# Patient Record
Sex: FEMALE | Race: BLACK OR AFRICAN AMERICAN | NOT HISPANIC OR LATINO | Employment: FULL TIME | URBAN - METROPOLITAN AREA
[De-identification: names, ages, dates, MRNs, and addresses within clinical notes are randomized per-mention and may not be internally consistent; named-entity substitution may affect disease eponyms.]

---

## 2017-02-17 ENCOUNTER — OFFICE VISIT (OUTPATIENT)
Dept: INTERNAL MEDICINE | Facility: CLINIC | Age: 37
End: 2017-02-17
Payer: COMMERCIAL

## 2017-02-17 VITALS
HEART RATE: 102 BPM | OXYGEN SATURATION: 97 % | TEMPERATURE: 98 F | HEIGHT: 66 IN | BODY MASS INDEX: 33.8 KG/M2 | WEIGHT: 210.31 LBS | SYSTOLIC BLOOD PRESSURE: 140 MMHG | DIASTOLIC BLOOD PRESSURE: 90 MMHG

## 2017-02-17 DIAGNOSIS — G43.009 MIGRAINE WITHOUT AURA AND WITHOUT STATUS MIGRAINOSUS, NOT INTRACTABLE: ICD-10-CM

## 2017-02-17 DIAGNOSIS — Z23 IMMUNIZATION DUE: ICD-10-CM

## 2017-02-17 DIAGNOSIS — R35.0 URINARY FREQUENCY: ICD-10-CM

## 2017-02-17 DIAGNOSIS — I10 HYPERTENSION GOAL BP (BLOOD PRESSURE) < 140/90: Primary | ICD-10-CM

## 2017-02-17 PROCEDURE — 99999 PR PBB SHADOW E&M-EST. PATIENT-LVL III: CPT | Mod: PBBFAC,,, | Performed by: INTERNAL MEDICINE

## 2017-02-17 PROCEDURE — 99214 OFFICE O/P EST MOD 30 MIN: CPT | Mod: 25,S$GLB,, | Performed by: INTERNAL MEDICINE

## 2017-02-17 PROCEDURE — 90688 IIV4 VACCINE SPLT 0.5 ML IM: CPT | Mod: S$GLB,,, | Performed by: INTERNAL MEDICINE

## 2017-02-17 PROCEDURE — 90471 IMMUNIZATION ADMIN: CPT | Mod: S$GLB,,, | Performed by: INTERNAL MEDICINE

## 2017-02-17 PROCEDURE — 3080F DIAST BP >= 90 MM HG: CPT | Mod: S$GLB,,, | Performed by: INTERNAL MEDICINE

## 2017-02-17 PROCEDURE — 3077F SYST BP >= 140 MM HG: CPT | Mod: S$GLB,,, | Performed by: INTERNAL MEDICINE

## 2017-02-17 RX ORDER — HYDROCHLOROTHIAZIDE 25 MG/1
25 TABLET ORAL DAILY
Qty: 30 TABLET | Refills: 3 | Status: SHIPPED | OUTPATIENT
Start: 2017-02-17 | End: 2017-05-09

## 2017-02-17 RX ORDER — METHYLPREDNISOLONE 4 MG/1
4 TABLET ORAL
COMMUNITY
End: 2017-05-24

## 2017-02-17 RX ORDER — BUTALBITAL, ACETAMINOPHEN AND CAFFEINE 50; 325; 40 MG/1; MG/1; MG/1
TABLET ORAL EVERY 6 HOURS PRN
Refills: 0 | COMMUNITY
Start: 2017-02-11 | End: 2018-04-30 | Stop reason: SDUPTHER

## 2017-02-17 RX ORDER — NORETHINDRONE 0.35 MG/1
TABLET ORAL
Refills: 11 | COMMUNITY
Start: 2017-01-22 | End: 2018-05-01 | Stop reason: ALTCHOICE

## 2017-02-20 NOTE — PROGRESS NOTES
Subjective:       Patient ID: Judie Munoz is a 36 y.o. female.    Chief Complaint: Establish Care    HPI Comments: 36 y.o. Black or  female     Patient presents with:  Establish Care    HPI: Here today to establish care.  HTN--she had ran out of b/p medication. She was having a migraine and could not figure out if it was causing her b/p to be high or if high b/p was causing the headache. She recently sought medical care for this and was prescribed a headache medication and a steroid dose pack. She has also seen her neurologist. Her headache is improving.   She has been urinating a lot. She is doing it more since being back on HCTZ, but did have it while she was off. She is concerned about diabetes. It runs in her family. She denies burning.     Past Medical History:    Hypertension                                                  Past Surgical History:    D&C x 3                                                          Comment:miscarriages    Review of patient's family history indicates:    Heart disease                  Mother                    Hypertension                   Mother                    Diabetes                       Father                    Cancer                         Maternal Aunt             Diabetes                       Paternal Aunt             Diabetes                       Maternal Grandmother      Heart disease                  Maternal Grandmother      Kidney disease                 Maternal Grandmother      Current Outpatient Prescriptions on File Prior to Visit:  zolmitriptan (ZOMIG-ZMT) 5 MG disintegrating tablet, Take 1 tablet (5 mg total) by mouth as needed., Disp: 6 tablet, Rfl: 2    Allergies:   Review of patient's allergies indicates:  No Known Allergies            Review of Systems   Constitutional: Positive for fatigue. Negative for fever and unexpected weight change.   HENT: Negative for trouble swallowing and voice change.    Eyes: Positive for visual  disturbance.   Respiratory: Negative for cough and shortness of breath.    Cardiovascular: Positive for chest pain, palpitations and leg swelling.   Gastrointestinal: Negative for abdominal pain, blood in stool, constipation and diarrhea.   Genitourinary: Positive for frequency and menstrual problem. Negative for dysuria. Urgency: irregular.   Musculoskeletal: Negative for arthralgias, back pain, gait problem and joint swelling.   Neurological: Positive for dizziness and headaches.   Psychiatric/Behavioral: Negative for dysphoric mood and sleep disturbance. The patient is not nervous/anxious.        Objective:      Physical Exam   Constitutional: She is oriented to person, place, and time. She appears well-developed and well-nourished. No distress.   Eyes: No scleral icterus.   Cardiovascular: Normal rate, regular rhythm and normal heart sounds.    Pulmonary/Chest: Effort normal and breath sounds normal. No respiratory distress.   Abdominal: Soft. Bowel sounds are normal.   Neurological: She is alert and oriented to person, place, and time.   Skin: Skin is warm and dry.   Psychiatric: She has a normal mood and affect.   Vitals reviewed.      Assessment:       1. Hypertension goal BP (blood pressure) < 140/90    2. Migraine without aura and without status migrainosus, not intractable    3. Urinary frequency    4. Immunization due        Plan:       Judie MACIAS was seen today for establish care.    Diagnoses and all orders for this visit:    Hypertension goal BP (blood pressure) < 140/90  -     Comprehensive metabolic panel; Future  -     TSH; Future  -     Lipid panel; Future  -     CBC auto differential; Future  -     hydrochlorothiazide (HYDRODIURIL) 25 MG tablet; Take 1 tablet (25 mg total) by mouth once daily.    Migraine without aura and without status migrainosus, not intractable  -      F/U with neurology    Urinary frequency  -     Hemoglobin A1c; Future    Immunization due  -     Influenza -  Quadrivalent    Schedule labs.

## 2017-04-24 ENCOUNTER — LAB VISIT (OUTPATIENT)
Dept: LAB | Facility: HOSPITAL | Age: 37
End: 2017-04-24
Attending: INTERNAL MEDICINE
Payer: COMMERCIAL

## 2017-04-24 DIAGNOSIS — R35.0 URINARY FREQUENCY: ICD-10-CM

## 2017-04-24 DIAGNOSIS — I10 HYPERTENSION GOAL BP (BLOOD PRESSURE) < 140/90: ICD-10-CM

## 2017-04-24 LAB
BASOPHILS # BLD AUTO: 0.02 K/UL
BASOPHILS NFR BLD: 0.2 %
DIFFERENTIAL METHOD: NORMAL
EOSINOPHIL # BLD AUTO: 0.1 K/UL
EOSINOPHIL NFR BLD: 1 %
ERYTHROCYTE [DISTWIDTH] IN BLOOD BY AUTOMATED COUNT: 14.1 %
HCT VFR BLD AUTO: 40.5 %
HGB BLD-MCNC: 13.1 G/DL
LYMPHOCYTES # BLD AUTO: 2.9 K/UL
LYMPHOCYTES NFR BLD: 30.5 %
MCH RBC QN AUTO: 27.6 PG
MCHC RBC AUTO-ENTMCNC: 32.3 %
MCV RBC AUTO: 85 FL
MONOCYTES # BLD AUTO: 0.7 K/UL
MONOCYTES NFR BLD: 7.4 %
NEUTROPHILS # BLD AUTO: 5.7 K/UL
NEUTROPHILS NFR BLD: 60.7 %
PLATELET # BLD AUTO: 324 K/UL
PMV BLD AUTO: 10.2 FL
RBC # BLD AUTO: 4.74 M/UL
WBC # BLD AUTO: 9.38 K/UL

## 2017-04-24 PROCEDURE — 80061 LIPID PANEL: CPT

## 2017-04-24 PROCEDURE — 85025 COMPLETE CBC W/AUTO DIFF WBC: CPT

## 2017-04-24 PROCEDURE — 80053 COMPREHEN METABOLIC PANEL: CPT

## 2017-04-24 PROCEDURE — 83036 HEMOGLOBIN GLYCOSYLATED A1C: CPT

## 2017-04-24 PROCEDURE — 36415 COLL VENOUS BLD VENIPUNCTURE: CPT

## 2017-04-24 PROCEDURE — 84443 ASSAY THYROID STIM HORMONE: CPT

## 2017-04-25 LAB
ALBUMIN SERPL BCP-MCNC: 3.6 G/DL
ALP SERPL-CCNC: 62 U/L
ALT SERPL W/O P-5'-P-CCNC: 11 U/L
ANION GAP SERPL CALC-SCNC: 11 MMOL/L
AST SERPL-CCNC: 15 U/L
BILIRUB SERPL-MCNC: 0.6 MG/DL
BUN SERPL-MCNC: 12 MG/DL
CALCIUM SERPL-MCNC: 9.1 MG/DL
CHLORIDE SERPL-SCNC: 102 MMOL/L
CHOLEST/HDLC SERPL: 3.4 {RATIO}
CO2 SERPL-SCNC: 27 MMOL/L
CREAT SERPL-MCNC: 0.8 MG/DL
EST. GFR  (AFRICAN AMERICAN): >60 ML/MIN/1.73 M^2
EST. GFR  (NON AFRICAN AMERICAN): >60 ML/MIN/1.73 M^2
ESTIMATED AVG GLUCOSE: 120 MG/DL
GLUCOSE SERPL-MCNC: 72 MG/DL
HBA1C MFR BLD HPLC: 5.8 %
HDL/CHOLESTEROL RATIO: 29.3 %
HDLC SERPL-MCNC: 164 MG/DL
HDLC SERPL-MCNC: 48 MG/DL
LDLC SERPL CALC-MCNC: 102.4 MG/DL
NONHDLC SERPL-MCNC: 116 MG/DL
POTASSIUM SERPL-SCNC: 3.8 MMOL/L
PROT SERPL-MCNC: 7.6 G/DL
SODIUM SERPL-SCNC: 140 MMOL/L
TRIGL SERPL-MCNC: 68 MG/DL
TSH SERPL DL<=0.005 MIU/L-ACNC: 1.1 UIU/ML

## 2017-04-28 ENCOUNTER — TELEPHONE (OUTPATIENT)
Dept: INTERNAL MEDICINE | Facility: CLINIC | Age: 37
End: 2017-04-28

## 2017-04-28 NOTE — TELEPHONE ENCOUNTER
----- Message from Daphne Guerrero DO sent at 4/25/2017  1:19 PM CDT -----  Notify patient labs are in normal range. No diabetes, thyroid disease, kidney disease or anemia.

## 2017-05-10 RX ORDER — HYDROCHLOROTHIAZIDE 25 MG/1
TABLET ORAL
Qty: 30 TABLET | Refills: 0 | OUTPATIENT
Start: 2017-05-10

## 2017-05-24 ENCOUNTER — OFFICE VISIT (OUTPATIENT)
Dept: INTERNAL MEDICINE | Facility: CLINIC | Age: 37
End: 2017-05-24
Payer: COMMERCIAL

## 2017-05-24 ENCOUNTER — OFFICE VISIT (OUTPATIENT)
Dept: OPHTHALMOLOGY | Facility: CLINIC | Age: 37
End: 2017-05-24
Payer: COMMERCIAL

## 2017-05-24 VITALS
BODY MASS INDEX: 34.78 KG/M2 | HEART RATE: 96 BPM | TEMPERATURE: 98 F | SYSTOLIC BLOOD PRESSURE: 126 MMHG | OXYGEN SATURATION: 97 % | DIASTOLIC BLOOD PRESSURE: 78 MMHG | WEIGHT: 221.56 LBS | HEIGHT: 67 IN

## 2017-05-24 DIAGNOSIS — H53.10 EYE STRAIN, BILATERAL: ICD-10-CM

## 2017-05-24 DIAGNOSIS — I10 ESSENTIAL HYPERTENSION: ICD-10-CM

## 2017-05-24 DIAGNOSIS — R51.9 FREQUENT HEADACHES: Primary | ICD-10-CM

## 2017-05-24 DIAGNOSIS — G43.019 INTRACTABLE MIGRAINE WITHOUT AURA AND WITHOUT STATUS MIGRAINOSUS: Primary | ICD-10-CM

## 2017-05-24 DIAGNOSIS — H53.9 CHANGES IN VISION: ICD-10-CM

## 2017-05-24 DIAGNOSIS — H52.03 HYPEROPIA, BILATERAL: ICD-10-CM

## 2017-05-24 PROCEDURE — 99214 OFFICE O/P EST MOD 30 MIN: CPT | Mod: S$GLB,,, | Performed by: FAMILY MEDICINE

## 2017-05-24 PROCEDURE — 92004 COMPRE OPH EXAM NEW PT 1/>: CPT | Mod: S$GLB,,, | Performed by: OPTOMETRIST

## 2017-05-24 PROCEDURE — 99999 PR PBB SHADOW E&M-EST. PATIENT-LVL III: CPT | Mod: PBBFAC,,, | Performed by: FAMILY MEDICINE

## 2017-05-24 PROCEDURE — 3078F DIAST BP <80 MM HG: CPT | Mod: S$GLB,,, | Performed by: FAMILY MEDICINE

## 2017-05-24 PROCEDURE — 92015 DETERMINE REFRACTIVE STATE: CPT | Mod: S$GLB,,, | Performed by: OPTOMETRIST

## 2017-05-24 PROCEDURE — 1160F RVW MEDS BY RX/DR IN RCRD: CPT | Mod: S$GLB,,, | Performed by: FAMILY MEDICINE

## 2017-05-24 PROCEDURE — 99999 PR PBB SHADOW E&M-EST. PATIENT-LVL I: CPT | Mod: PBBFAC,,, | Performed by: OPTOMETRIST

## 2017-05-24 PROCEDURE — 3074F SYST BP LT 130 MM HG: CPT | Mod: S$GLB,,, | Performed by: OPTOMETRIST

## 2017-05-24 PROCEDURE — 3074F SYST BP LT 130 MM HG: CPT | Mod: S$GLB,,, | Performed by: FAMILY MEDICINE

## 2017-05-24 PROCEDURE — 3078F DIAST BP <80 MM HG: CPT | Mod: S$GLB,,, | Performed by: OPTOMETRIST

## 2017-05-24 RX ORDER — HYDROCHLOROTHIAZIDE 25 MG/1
25 TABLET ORAL DAILY
COMMUNITY
End: 2017-10-30 | Stop reason: SDUPTHER

## 2017-05-24 RX ORDER — VENLAFAXINE HYDROCHLORIDE 75 MG/1
CAPSULE, EXTENDED RELEASE ORAL
Refills: 3 | COMMUNITY
Start: 2017-05-02 | End: 2018-05-01 | Stop reason: ALTCHOICE

## 2017-05-24 NOTE — LETTER
May 24, 2017      Ritika Tong MD  [address] [place] [id number]           [facility]  [address] [place] [id number]  Phone: [phone]  Fax: [phone]          Patient: Judie Munoz   MR Number: [id number]   YOB: 1980   Date of Visit: 5/24/2017       Dear Dr. Ritika Tong:    Thank you for referring Judie Munoz to me for evaluation. Attached you will find relevant portions of my assessment and plan of care.    If you have questions, please do not hesitate to call me. I look forward to following Judie Munoz along with you.    Sincerely,    Stevie Bennett, OD    Enclosure  CC:  No Recipients    If you would like to receive this communication electronically, please contact [email or web address] or [phone] to request more information on [employer] Link access.    For providers and/or their staff who would like to refer a patient to [facility], please contact us through our one-stop-shop provider referral line, [facility], at [phone].    If you feel you have received this communication in error or would no longer like to receive these types of communications, please e-mail [email or web address]

## 2017-05-24 NOTE — PROGRESS NOTES
HPI     Patient referred by Dr. Ritika Tong due pressure in back of head x 3   days and temples twitching. mirgaines x 25 years. New patient last eye   exam 3 months ago no problems. No headaches today . HX of floaters no   flashes of light.    Last edited by Stevie Bennett, OD on 5/24/2017 11:46 AM. (History)            Assessment /Plan     For exam results, see Encounter Report.    Frequent headaches    Essential hypertension    Hyperopia, bilateral      Eyestrain at the computer 18 hours/day causing myokymia and headaches.    She will try mild computer glasses for relief.    Dispense Final Rx for glasses. Or OTC +1.00  RTC 1 year

## 2017-06-26 ENCOUNTER — OFFICE VISIT (OUTPATIENT)
Dept: INTERNAL MEDICINE | Facility: CLINIC | Age: 37
End: 2017-06-26
Payer: COMMERCIAL

## 2017-06-26 VITALS
HEART RATE: 94 BPM | OXYGEN SATURATION: 98 % | DIASTOLIC BLOOD PRESSURE: 87 MMHG | TEMPERATURE: 99 F | HEIGHT: 67 IN | WEIGHT: 224.88 LBS | BODY MASS INDEX: 35.29 KG/M2 | SYSTOLIC BLOOD PRESSURE: 138 MMHG

## 2017-06-26 DIAGNOSIS — H92.03 OTALGIA OF BOTH EARS: ICD-10-CM

## 2017-06-26 DIAGNOSIS — J01.00 ACUTE NON-RECURRENT MAXILLARY SINUSITIS: ICD-10-CM

## 2017-06-26 DIAGNOSIS — R51.9 SINUS HEADACHE: Primary | ICD-10-CM

## 2017-06-26 PROCEDURE — 99999 PR PBB SHADOW E&M-EST. PATIENT-LVL III: CPT | Mod: PBBFAC,,, | Performed by: FAMILY MEDICINE

## 2017-06-26 PROCEDURE — 99214 OFFICE O/P EST MOD 30 MIN: CPT | Mod: S$GLB,,, | Performed by: FAMILY MEDICINE

## 2017-06-26 RX ORDER — METHYLPREDNISOLONE 4 MG/1
TABLET ORAL
Qty: 1 PACKAGE | Refills: 0 | Status: SHIPPED | OUTPATIENT
Start: 2017-06-26 | End: 2017-07-11 | Stop reason: ALTCHOICE

## 2017-06-26 RX ORDER — CEFDINIR 300 MG/1
300 CAPSULE ORAL 2 TIMES DAILY
Qty: 20 CAPSULE | Refills: 0 | Status: SHIPPED | OUTPATIENT
Start: 2017-06-26 | End: 2017-07-06

## 2017-06-26 NOTE — PATIENT INSTRUCTIONS

## 2017-06-26 NOTE — PROGRESS NOTES
Judie Munoz  06/26/2017  [id number]    Teresa Flores MD  Patient Care Team:  Teresa Flores MD as PCP - General (Family Medicine)  Has the patient seen any provider outside of the [facility] network since the last visit? (no). If yes, HIPPA forms completed and records requested.        Visit Type:an urgent visit for a new problem    Chief Complaint:  Chief Complaint   Patient presents with    Headache     Sinus Congestion x 2nd Week, Took OTC Meds    Otalgia     Both Ears       History of Present Illness:  HPI     Headache    Additional comments: Sinus Congestion x 2nd Week, Took OTC Meds           Otalgia    Additional comments: Both Ears       Last edited by Kala Zarate MA on 6/26/2017  2:19 PM. (History)      Patient is here for ear pain, reports 2 weeks, both ears.  She reports having some sinus drainage. She has associated headaches, located frontal and she reports she does have associated pain and pressure bilateral maxillary region.    She had tried the Fioricet for the headache that hasn't seemed to help. She did try Claritin daily.  She denies any associated sneeze or rhinorrhea. No fever.    She is taking HCTZ for her BP. Recheck it was better today.  Labs up to date.    History:  Past Medical History:   Diagnosis Date    Hypertension      Past Surgical History:   Procedure Laterality Date    D&C x 3       miscarriages     Family History   Problem Relation Age of Onset    Heart disease Mother     Hypertension Mother     Diabetes Father     Cancer Maternal Aunt     Diabetes Paternal Aunt     Diabetes Maternal Grandmother     Heart disease Maternal Grandmother     Kidney disease Maternal Grandmother      Social History     Social History    Marital status: Single     Spouse name: N/A    Number of children: N/A    Years of education: N/A     Occupational History     [employer]     Social History Main Topics    Smoking status: Never Smoker    Smokeless tobacco: Never  Used    Alcohol use No    Drug use: No    Sexual activity: Not Currently     Partners: Male     Other Topics Concern    Not on file     Social History Narrative    No narrative on file     Patient Active Problem List   Diagnosis    Hypertension    Migraines     Review of patient's allergies indicates:  No Known Allergies    The following were reviewed at this visit: active problem list, medication list, allergies, family history, social history, and health maintenance.    Medications:  Current Outpatient Prescriptions on File Prior to Visit   Medication Sig Dispense Refill    butalbital-acetaminophen-caffeine -40 mg (FIORICET, ESGIC) -40 mg per tablet every 6 (six) hours as needed.  0    hydrochlorothiazide (HYDRODIURIL) 25 MG tablet Take 25 mg by mouth once daily.      POP-BE 0.35 mg tablet TK 1 T PO D  11    zolmitriptan (ZOMIG-ZMT) 5 MG disintegrating tablet Take 1 tablet (5 mg total) by mouth as needed. 6 tablet 2    venlafaxine (EFFEXOR-XR) 75 MG 24 hr capsule TK 1 C PO HS  3     No current facility-administered medications on file prior to visit.        Medications have been reviewed and reconciled with patient at this visit.  Barriers to medications present (no)    Adverse reactions to current medications (no)    Over the counter medications reviewed (Yes ), and if needed added to active Medication list at this visit.     Exam:  Wt Readings from Last 3 Encounters:   06/26/17 102 kg (224 lb 13.9 oz)   05/24/17 100.5 kg (221 lb 9 oz)   02/17/17 95.4 kg (210 lb 5.1 oz)     Temp Readings from Last 3 Encounters:   06/26/17 98.8 °F (37.1 °C) (Tympanic)   05/24/17 98.3 °F (36.8 °C) (Tympanic)   02/17/17 97.7 °F (36.5 °C) (Tympanic)     BP Readings from Last 3 Encounters:   06/26/17 138/87   05/24/17 126/78   02/17/17 (!) 140/90     Pulse Readings from Last 3 Encounters:   06/26/17 94   05/24/17 96   02/17/17 102     Body mass index is 35.21 kg/m².      Review of Systems   Constitutional:  Negative.  Negative for chills and fever.   HENT: Positive for congestion and ear pain. Negative for ear discharge and sore throat.    Eyes: Negative for blurred vision and double vision.   Respiratory: Negative for cough and wheezing.    Cardiovascular: Negative for chest pain and palpitations.   Gastrointestinal: Negative for nausea and vomiting.   Genitourinary: Negative.    Musculoskeletal: Negative.    Skin: Negative for itching and rash.   Neurological: Positive for headaches. Negative for dizziness, tingling, tremors and sensory change.   Endo/Heme/Allergies: Does not bruise/bleed easily.   Psychiatric/Behavioral: Negative for depression. The patient is not nervous/anxious.        Physical Exam   Constitutional: She appears well-developed and well-nourished. No distress.   HENT:   Head: Normocephalic and atraumatic.   Right Ear: A middle ear effusion is present.   Left Ear: A middle ear effusion is present.   Nose: Rhinorrhea present. Right sinus exhibits maxillary sinus tenderness and frontal sinus tenderness. Left sinus exhibits maxillary sinus tenderness and frontal sinus tenderness.   Mouth/Throat: Oropharynx is clear and moist. No oropharyngeal exudate.   Eyes: EOM are normal. Pupils are equal, round, and reactive to light.   Neck: Normal range of motion. No thyromegaly present.   Cardiovascular: Normal rate, regular rhythm, normal heart sounds and intact distal pulses.    No murmur heard.  Lymphadenopathy:     She has no cervical adenopathy.   Skin: She is not diaphoretic.   Nursing note and vitals reviewed.      Laboratory Reviewed ({Yes)  Lab Results   Component Value Date    WBC 9.38 04/24/2017    HGB 13.1 04/24/2017    HCT 40.5 04/24/2017     04/24/2017    CHOL 164 04/24/2017    TRIG 68 04/24/2017    HDL 48 04/24/2017    ALT 11 04/24/2017    AST 15 04/24/2017     04/24/2017    K 3.8 04/24/2017     04/24/2017    CREATININE 0.8 04/24/2017    BUN 12 04/24/2017    CO2 27 04/24/2017     TSH 1.099 04/24/2017    HGBA1C 5.8 04/24/2017       Assessment:  The primary encounter diagnosis was Sinus headache. Diagnoses of Acute non-recurrent maxillary sinusitis and Otalgia of both ears were also pertinent to this visit.     Plan       Sinus headache  -     cefdinir (OMNICEF) 300 MG capsule; Take 1 capsule (300 mg total) by mouth 2 (two) times daily.  Dispense: 20 capsule; Refill: 0  -     methylPREDNISolone (MEDROL DOSEPACK) 4 mg tablet; use as directed  Dispense: 1 Package; Refill: 0    Acute non-recurrent maxillary sinusitis  -     cefdinir (OMNICEF) 300 MG capsule; Take 1 capsule (300 mg total) by mouth 2 (two) times daily.  Dispense: 20 capsule; Refill: 0  -     methylPREDNISolone (MEDROL DOSEPACK) 4 mg tablet; use as directed  Dispense: 1 Package; Refill: 0    Otalgia of both ears  -     cefdinir (OMNICEF) 300 MG capsule; Take 1 capsule (300 mg total) by mouth 2 (two) times daily.  Dispense: 20 capsule; Refill: 0  -     methylPREDNISolone (MEDROL DOSEPACK) 4 mg tablet; use as directed  Dispense: 1 Package; Refill: 0      Care Plan/Goals: Reviewed  (N/A)  Goals     None          Follow up: No Follow-up on file.    After visit summary was printed and given to patient upon discharge today.  Patient goals and care plan are included in After Visit Summary.

## 2017-07-10 ENCOUNTER — PATIENT OUTREACH (OUTPATIENT)
Dept: ADMINISTRATIVE | Facility: HOSPITAL | Age: 37
End: 2017-07-10

## 2017-07-10 NOTE — PROGRESS NOTES
I have attempted without success to contact this patient to schedule an appointment for blood pressure recheck. Patient not available, unable to leave voicemail, full.

## 2017-07-11 ENCOUNTER — OFFICE VISIT (OUTPATIENT)
Dept: INTERNAL MEDICINE | Facility: CLINIC | Age: 37
End: 2017-07-11
Payer: COMMERCIAL

## 2017-07-11 VITALS
TEMPERATURE: 98 F | DIASTOLIC BLOOD PRESSURE: 80 MMHG | HEART RATE: 98 BPM | BODY MASS INDEX: 35.71 KG/M2 | WEIGHT: 227.5 LBS | SYSTOLIC BLOOD PRESSURE: 126 MMHG | HEIGHT: 67 IN | OXYGEN SATURATION: 99 %

## 2017-07-11 DIAGNOSIS — B37.9 ANTIBIOTIC-INDUCED YEAST INFECTION: ICD-10-CM

## 2017-07-11 DIAGNOSIS — R30.0 DYSURIA: ICD-10-CM

## 2017-07-11 DIAGNOSIS — N39.0 URINARY TRACT INFECTION WITHOUT HEMATURIA, SITE UNSPECIFIED: Primary | ICD-10-CM

## 2017-07-11 DIAGNOSIS — T36.95XA ANTIBIOTIC-INDUCED YEAST INFECTION: ICD-10-CM

## 2017-07-11 LAB
BACTERIA #/AREA URNS HPF: ABNORMAL /HPF
BILIRUB UR QL STRIP: NEGATIVE
CLARITY UR: ABNORMAL
COLOR UR: YELLOW
GLUCOSE UR QL STRIP: NEGATIVE
HGB UR QL STRIP: ABNORMAL
KETONES UR QL STRIP: NEGATIVE
LEUKOCYTE ESTERASE UR QL STRIP: ABNORMAL
MICROSCOPIC COMMENT: ABNORMAL
NITRITE UR QL STRIP: NEGATIVE
PH UR STRIP: 6 [PH] (ref 5–8)
PROT UR QL STRIP: ABNORMAL
RBC #/AREA URNS HPF: 60 /HPF (ref 0–4)
SP GR UR STRIP: 1.01 (ref 1–1.03)
SQUAMOUS #/AREA URNS HPF: 74 /HPF
URN SPEC COLLECT METH UR: ABNORMAL
WBC #/AREA URNS HPF: >100 /HPF (ref 0–5)
YEAST URNS QL MICRO: ABNORMAL

## 2017-07-11 PROCEDURE — 99999 PR PBB SHADOW E&M-EST. PATIENT-LVL IV: CPT | Mod: PBBFAC,,, | Performed by: NURSE PRACTITIONER

## 2017-07-11 PROCEDURE — 81000 URINALYSIS NONAUTO W/SCOPE: CPT

## 2017-07-11 PROCEDURE — 99214 OFFICE O/P EST MOD 30 MIN: CPT | Mod: S$GLB,,, | Performed by: NURSE PRACTITIONER

## 2017-07-11 PROCEDURE — 87086 URINE CULTURE/COLONY COUNT: CPT

## 2017-07-11 RX ORDER — CEFDINIR 300 MG/1
300 CAPSULE ORAL 2 TIMES DAILY
COMMUNITY
End: 2017-11-01

## 2017-07-11 RX ORDER — PHENAZOPYRIDINE HYDROCHLORIDE 100 MG/1
100 TABLET, FILM COATED ORAL 3 TIMES DAILY PRN
Qty: 6 TABLET | Refills: 0 | Status: SHIPPED | OUTPATIENT
Start: 2017-07-11 | End: 2017-07-21

## 2017-07-11 RX ORDER — CIPROFLOXACIN 500 MG/1
500 TABLET ORAL EVERY 12 HOURS
Qty: 14 TABLET | Refills: 0 | Status: SHIPPED | OUTPATIENT
Start: 2017-07-11 | End: 2017-07-18

## 2017-07-11 RX ORDER — FLUCONAZOLE 150 MG/1
150 TABLET ORAL DAILY
Qty: 1 TABLET | Refills: 1 | Status: SHIPPED | OUTPATIENT
Start: 2017-07-11 | End: 2017-07-12

## 2017-07-11 NOTE — PROGRESS NOTES
Subjective:       Patient ID: Judie Munoz is a 36 y.o. female.    Chief Complaint: Other (pain with urination)    Patient presents with dysuria for past four days.      Dysuria    This is a new problem. The current episode started in the past 7 days. The problem occurs intermittently. The problem has been gradually worsening. The quality of the pain is described as burning. The pain is at a severity of 7/10. There has been no fever. She is sexually active. There is no history of pyelonephritis. Associated symptoms include chills, flank pain, frequency, hesitancy, urgency and bubble bath use. Pertinent negatives include no hematuria, nausea, possible pregnancy, vomiting or constipation. Associated symptoms comments: Hot tub/hot baths. She has tried increased fluids (cranberry juice) for the symptoms. The treatment provided no relief.     Review of Systems   Constitutional: Positive for chills. Negative for fever.   Gastrointestinal: Negative for constipation, nausea and vomiting.   Genitourinary: Positive for dysuria, flank pain, frequency, hesitancy and urgency. Negative for enuresis, hematuria and vaginal discharge.   Musculoskeletal: Positive for back pain.       Objective:      Physical Exam   Constitutional: She appears well-developed and well-nourished. No distress.   HENT:   Head: Normocephalic.   Eyes: Conjunctivae and EOM are normal. Pupils are equal, round, and reactive to light.   Cardiovascular: Normal rate and regular rhythm.    No murmur heard.  Pulmonary/Chest: Effort normal and breath sounds normal. No respiratory distress. She has no wheezes.   Abdominal: Bowel sounds are normal. She exhibits no distension and no mass. There is tenderness in the suprapubic area and left lower quadrant. There is CVA tenderness (left). There is no rebound and no guarding.   Psychiatric: She has a normal mood and affect.   Vitals reviewed.      Assessment:       1. Urinary tract infection without hematuria, site  unspecified    2. Dysuria    3. Antibiotic-induced yeast infection        Plan:   Urinary tract infection without hematuria, site unspecified  -     ciprofloxacin HCl (CIPRO) 500 MG tablet; Take 1 tablet (500 mg total) by mouth every 12 (twelve) hours.  Dispense: 14 tablet; Refill: 0    Dysuria  -     Urinalysis  -     Urine culture  -     phenazopyridine (PYRIDIUM) 100 MG tablet; Take 1 tablet (100 mg total) by mouth 3 (three) times daily as needed for Pain.  Dispense: 6 tablet; Refill: 0    Antibiotic-induced yeast infection  -     fluconazole (DIFLUCAN) 150 MG Tab; Take 1 tablet (150 mg total) by mouth once daily.  Dispense: 1 tablet; Refill: 1    Other orders  -     Urinalysis Microscopic      Discussed UA results and treat based off symptoms, exam, and UA. Will notify patient will culture results. Patient verbalized understanding and agreement.  Return if symptoms worsen or fail to improve.

## 2017-07-13 LAB
BACTERIA UR CULT: NORMAL
BACTERIA UR CULT: NORMAL

## 2017-07-17 ENCOUNTER — TELEPHONE (OUTPATIENT)
Dept: INTERNAL MEDICINE | Facility: CLINIC | Age: 37
End: 2017-07-17

## 2017-07-17 NOTE — TELEPHONE ENCOUNTER
Patient notified of results and recommendations. Advised to call the office as needed, patient verbalized understanding.

## 2017-07-17 NOTE — TELEPHONE ENCOUNTER
----- Message from Leah Tong NP sent at 7/17/2017  8:11 AM CDT -----  Please notify patient urine culture did not grow out one specific bacteria so continue cipro and if symptoms return, we can repeat.

## 2017-10-28 DIAGNOSIS — I10 HYPERTENSION GOAL BP (BLOOD PRESSURE) < 140/90: ICD-10-CM

## 2017-10-30 RX ORDER — HYDROCHLOROTHIAZIDE 25 MG/1
TABLET ORAL
Qty: 30 TABLET | Refills: 1 | Status: SHIPPED | OUTPATIENT
Start: 2017-10-30 | End: 2018-04-30 | Stop reason: SDUPTHER

## 2017-11-01 ENCOUNTER — OFFICE VISIT (OUTPATIENT)
Dept: INTERNAL MEDICINE | Facility: CLINIC | Age: 37
End: 2017-11-01
Payer: COMMERCIAL

## 2017-11-01 ENCOUNTER — TELEPHONE (OUTPATIENT)
Dept: INTERNAL MEDICINE | Facility: CLINIC | Age: 37
End: 2017-11-01

## 2017-11-01 VITALS
SYSTOLIC BLOOD PRESSURE: 130 MMHG | HEIGHT: 67 IN | OXYGEN SATURATION: 99 % | DIASTOLIC BLOOD PRESSURE: 96 MMHG | BODY MASS INDEX: 36.34 KG/M2 | HEART RATE: 87 BPM | TEMPERATURE: 98 F | WEIGHT: 231.5 LBS

## 2017-11-01 DIAGNOSIS — G43.C0 PERIODIC HEADACHE SYNDROME, NOT INTRACTABLE: Primary | ICD-10-CM

## 2017-11-01 PROCEDURE — 99213 OFFICE O/P EST LOW 20 MIN: CPT | Mod: 25,S$GLB,, | Performed by: FAMILY MEDICINE

## 2017-11-01 PROCEDURE — 96372 THER/PROPH/DIAG INJ SC/IM: CPT | Mod: S$GLB,,, | Performed by: FAMILY MEDICINE

## 2017-11-01 PROCEDURE — 99999 PR PBB SHADOW E&M-EST. PATIENT-LVL III: CPT | Mod: PBBFAC,,, | Performed by: FAMILY MEDICINE

## 2017-11-01 RX ORDER — KETOROLAC TROMETHAMINE 30 MG/ML
60 INJECTION, SOLUTION INTRAMUSCULAR; INTRAVENOUS ONCE
Status: COMPLETED | OUTPATIENT
Start: 2017-11-01 | End: 2017-11-01

## 2017-11-01 RX ORDER — TRAMADOL HYDROCHLORIDE 50 MG/1
50 TABLET ORAL EVERY 12 HOURS PRN
Qty: 5 TABLET | Refills: 0 | Status: SHIPPED | OUTPATIENT
Start: 2017-11-01 | End: 2017-11-11

## 2017-11-01 RX ADMIN — KETOROLAC TROMETHAMINE 60 MG: 30 INJECTION, SOLUTION INTRAMUSCULAR; INTRAVENOUS at 09:11

## 2017-11-01 NOTE — TELEPHONE ENCOUNTER
----- Message from Esperanza Robertson sent at 11/1/2017  3:28 PM CDT -----  Contact: pt  Calling in regards to the shot did not help her headaches and was told to give you a call if headaches did not stop, [phone]

## 2017-11-01 NOTE — PROGRESS NOTES
Judie Munoz  11/01/2017  [id number]    Teresa Flores MD  Patient Care Team:  Teresa Flores MD as PCP - General (Family Medicine)  Has the patient seen any provider outside of the [facility] network since the last visit? (no). If yes, HIPPA forms completed and records requested.        Visit Type:an urgent visit for an existing problem     Chief Complaint:  Chief Complaint   Patient presents with    Headache     Since Saturday,Getting Worse Everyday,Took All Migraine Meds,Not Working       History of Present Illness:  37 year old here for Headache, Migraine type.  She has history of migraines.  Previous tx with Fioricet and Zomig.    Chart review shows that she has history of migraines. She has not had any imaging per her reports.  She is on Firociet and Zomig.  She reports she had been on Topamax in past, which she reports she stopped because of anxiety and poor sleep.   She was switched to Effexor. She started that in December 2016..     She has been controlling the Migraines since our last visit.  This one started Sat, possible due to weather change as trigger.  The zomig and Fioricet has not relieved.  Mild nausea and photophobia. She has not vomited.    She has went to ER in past for Migraine, she has gotten a shot of Toradol before to help with migraine per patient report.    History:  Past Medical History:   Diagnosis Date    Hypertension      Past Surgical History:   Procedure Laterality Date    D&C x 3       miscarriages     Family History   Problem Relation Age of Onset    Heart disease Mother     Hypertension Mother     Diabetes Father     Cancer Maternal Aunt     Diabetes Paternal Aunt     Diabetes Maternal Grandmother     Heart disease Maternal Grandmother     Kidney disease Maternal Grandmother      Social History     Social History    Marital status: Single     Spouse name: N/A    Number of children: 2    Years of education: N/A     Occupational History     [employer]      Social History Main Topics    Smoking status: Never Smoker    Smokeless tobacco: Never Used    Alcohol use No    Drug use: No    Sexual activity: Not Currently     Partners: Male     Other Topics Concern    Not on file     Social History Narrative    No narrative on file     Patient Active Problem List   Diagnosis    Hypertension    Migraines     Review of patient's allergies indicates:  No Known Allergies    The following were reviewed at this visit: active problem list, medication list, allergies, family history, social history, and health maintenance.    Medications:  Current Outpatient Prescriptions on File Prior to Visit   Medication Sig Dispense Refill    butalbital-acetaminophen-caffeine -40 mg (FIORICET, ESGIC) -40 mg per tablet every 6 (six) hours as needed.  0    hydroCHLOROthiazide (HYDRODIURIL) 25 MG tablet TAKE 1 TABLET BY MOUTH ONCE DAILY 30 tablet 1    POP-BE 0.35 mg tablet TK 1 T PO D  11    zolmitriptan (ZOMIG-ZMT) 5 MG disintegrating tablet Take 1 tablet (5 mg total) by mouth as needed. 6 tablet 2    venlafaxine (EFFEXOR-XR) 75 MG 24 hr capsule TK 1 C PO HS  3    [DISCONTINUED] cefdinir (OMNICEF) 300 MG capsule Take 300 mg by mouth 2 (two) times daily.       No current facility-administered medications on file prior to visit.        Medications have been reviewed and reconciled with patient at this visit.  Barriers to medications present (no)    Adverse reactions to current medications (no)    Over the counter medications reviewed (Yes ), and if needed added to active Medication list at this visit.     Exam:  Wt Readings from Last 3 Encounters:   11/01/17 105 kg (231 lb 8 oz)   07/11/17 103.2 kg (227 lb 8.2 oz)   06/26/17 102 kg (224 lb 13.9 oz)     Temp Readings from Last 3 Encounters:   11/01/17 98.3 °F (36.8 °C) (Tympanic)   07/11/17 98.4 °F (36.9 °C) (Tympanic)   06/26/17 98.8 °F (37.1 °C) (Tympanic)     BP Readings from Last 3 Encounters:   11/01/17 (!) 130/96    07/11/17 126/80   06/26/17 138/87     Pulse Readings from Last 3 Encounters:   11/01/17 87   07/11/17 98   06/26/17 94     Body mass index is 36.25 kg/m².      Review of Systems   Constitutional: Negative.  Negative for chills and fever.   HENT: Negative for congestion, sinus pain and sore throat.    Eyes: Negative.  Negative for blurred vision and double vision.   Respiratory: Negative for cough, sputum production, shortness of breath and wheezing.    Cardiovascular: Negative for chest pain, palpitations and leg swelling.   Gastrointestinal: Positive for nausea. Negative for abdominal pain, constipation, diarrhea, heartburn and vomiting.   Genitourinary: Negative.    Musculoskeletal: Negative.    Skin: Negative.  Negative for rash.   Neurological: Positive for headaches. Negative for dizziness, sensory change and speech change.   Endo/Heme/Allergies: Negative.  Negative for polydipsia. Does not bruise/bleed easily.   Psychiatric/Behavioral: Negative for depression and substance abuse.       Physical Exam   Constitutional: She is oriented to person, place, and time. She appears well-developed and well-nourished. No distress.   HENT:   Head: Normocephalic and atraumatic.   Right Ear: External ear normal.   Left Ear: External ear normal.   Nose: Nose normal.   Mouth/Throat: Oropharynx is clear and moist. No oropharyngeal exudate.   Eyes: Conjunctivae and EOM are normal. Pupils are equal, round, and reactive to light. Right eye exhibits no discharge. Left eye exhibits no discharge.   Neck: Normal range of motion. Neck supple. No thyromegaly present.   Cardiovascular: Normal rate, regular rhythm, normal heart sounds and intact distal pulses.    No murmur heard.  Pulmonary/Chest: Effort normal and breath sounds normal. No respiratory distress. She has no wheezes.   Abdominal: Soft. Bowel sounds are normal. She exhibits no distension and no mass. There is no tenderness.   Musculoskeletal: Normal range of motion. She  exhibits no edema.   Lymphadenopathy:     She has no cervical adenopathy.   Neurological: She is alert and oriented to person, place, and time. She displays normal reflexes. No cranial nerve deficit. Coordination normal.   Skin: Capillary refill takes less than 2 seconds. She is not diaphoretic.   Psychiatric: She has a normal mood and affect. Her behavior is normal. Judgment and thought content normal.   Nursing note and vitals reviewed.      Laboratory Reviewed ({Yes)  Lab Results   Component Value Date    WBC 9.38 04/24/2017    HGB 13.1 04/24/2017    HCT 40.5 04/24/2017     04/24/2017    CHOL 164 04/24/2017    TRIG 68 04/24/2017    HDL 48 04/24/2017    ALT 11 04/24/2017    AST 15 04/24/2017     04/24/2017    K 3.8 04/24/2017     04/24/2017    CREATININE 0.8 04/24/2017    BUN 12 04/24/2017    CO2 27 04/24/2017    TSH 1.099 04/24/2017    HGBA1C 5.8 04/24/2017       Assessment:  The encounter diagnosis was Periodic headache syndrome, not intractable.     Plan     Periodic headache syndrome, not intractable  -     ketorolac injection 60 mg; Inject 2 mLs (60 mg total) into the muscle once.    Recheck BP at home. Possible BP elevated due to HA.  Will see if improves with treatment. Continue HCTZ  Call if not resolving.      Care Plan/Goals: Reviewed  (Yes)  Goals     None          Follow up: No Follow-up on file.    After visit summary was printed and given to patient upon discharge today.  Patient goals and care plan are included in After Visit Summary.

## 2017-11-01 NOTE — TELEPHONE ENCOUNTER
Tell her I will send 5 tablets of Ultram for pain.  She try the pain meds.  Use Zomig tomorrow as well to try to abort.

## 2018-03-13 DIAGNOSIS — I10 HYPERTENSION GOAL BP (BLOOD PRESSURE) < 140/90: ICD-10-CM

## 2018-03-13 RX ORDER — HYDROCHLOROTHIAZIDE 25 MG/1
TABLET ORAL
Qty: 30 TABLET | Refills: 0 | OUTPATIENT
Start: 2018-03-13

## 2018-04-27 ENCOUNTER — PATIENT OUTREACH (OUTPATIENT)
Dept: ADMINISTRATIVE | Facility: HOSPITAL | Age: 38
End: 2018-04-27

## 2018-04-30 ENCOUNTER — PATIENT MESSAGE (OUTPATIENT)
Dept: ADMINISTRATIVE | Facility: OTHER | Age: 38
End: 2018-04-30

## 2018-04-30 ENCOUNTER — LAB VISIT (OUTPATIENT)
Dept: LAB | Facility: HOSPITAL | Age: 38
End: 2018-04-30
Attending: FAMILY MEDICINE
Payer: COMMERCIAL

## 2018-04-30 ENCOUNTER — OFFICE VISIT (OUTPATIENT)
Dept: INTERNAL MEDICINE | Facility: CLINIC | Age: 38
End: 2018-04-30
Payer: COMMERCIAL

## 2018-04-30 VITALS
HEART RATE: 84 BPM | SYSTOLIC BLOOD PRESSURE: 140 MMHG | HEIGHT: 67 IN | DIASTOLIC BLOOD PRESSURE: 98 MMHG | TEMPERATURE: 98 F | BODY MASS INDEX: 36.13 KG/M2 | WEIGHT: 230.19 LBS | OXYGEN SATURATION: 98 %

## 2018-04-30 DIAGNOSIS — I10 ESSENTIAL HYPERTENSION: ICD-10-CM

## 2018-04-30 DIAGNOSIS — I10 HYPERTENSION GOAL BP (BLOOD PRESSURE) < 140/90: ICD-10-CM

## 2018-04-30 DIAGNOSIS — Z00.00 ANNUAL PHYSICAL EXAM: ICD-10-CM

## 2018-04-30 DIAGNOSIS — Z00.00 ANNUAL PHYSICAL EXAM: Primary | ICD-10-CM

## 2018-04-30 LAB
ALBUMIN SERPL BCP-MCNC: 3.7 G/DL
ALP SERPL-CCNC: 67 U/L
ALT SERPL W/O P-5'-P-CCNC: 12 U/L
ANION GAP SERPL CALC-SCNC: 8 MMOL/L
AST SERPL-CCNC: 13 U/L
BILIRUB SERPL-MCNC: 0.4 MG/DL
BUN SERPL-MCNC: 11 MG/DL
CALCIUM SERPL-MCNC: 9.1 MG/DL
CHLORIDE SERPL-SCNC: 103 MMOL/L
CO2 SERPL-SCNC: 27 MMOL/L
CREAT SERPL-MCNC: 0.7 MG/DL
EST. GFR  (AFRICAN AMERICAN): >60 ML/MIN/1.73 M^2
EST. GFR  (NON AFRICAN AMERICAN): >60 ML/MIN/1.73 M^2
GLUCOSE SERPL-MCNC: 98 MG/DL
POTASSIUM SERPL-SCNC: 3.7 MMOL/L
PROT SERPL-MCNC: 7.6 G/DL
SODIUM SERPL-SCNC: 138 MMOL/L

## 2018-04-30 PROCEDURE — 99395 PREV VISIT EST AGE 18-39: CPT | Mod: 25,S$GLB,, | Performed by: FAMILY MEDICINE

## 2018-04-30 PROCEDURE — 90715 TDAP VACCINE 7 YRS/> IM: CPT | Mod: S$GLB,,, | Performed by: FAMILY MEDICINE

## 2018-04-30 PROCEDURE — 36415 COLL VENOUS BLD VENIPUNCTURE: CPT

## 2018-04-30 PROCEDURE — 99213 OFFICE O/P EST LOW 20 MIN: CPT | Mod: 25,S$GLB,, | Performed by: FAMILY MEDICINE

## 2018-04-30 PROCEDURE — 80053 COMPREHEN METABOLIC PANEL: CPT

## 2018-04-30 PROCEDURE — 3077F SYST BP >= 140 MM HG: CPT | Mod: CPTII,S$GLB,, | Performed by: FAMILY MEDICINE

## 2018-04-30 PROCEDURE — 3080F DIAST BP >= 90 MM HG: CPT | Mod: CPTII,S$GLB,, | Performed by: FAMILY MEDICINE

## 2018-04-30 PROCEDURE — 90471 IMMUNIZATION ADMIN: CPT | Mod: S$GLB,,, | Performed by: FAMILY MEDICINE

## 2018-04-30 PROCEDURE — 99999 PR PBB SHADOW E&M-EST. PATIENT-LVL III: CPT | Mod: PBBFAC,,, | Performed by: FAMILY MEDICINE

## 2018-04-30 RX ORDER — HYDROCHLOROTHIAZIDE 25 MG/1
25 TABLET ORAL DAILY
Qty: 90 TABLET | Refills: 1 | Status: SHIPPED | OUTPATIENT
Start: 2018-04-30 | End: 2018-11-24 | Stop reason: SDUPTHER

## 2018-04-30 RX ORDER — BUTALBITAL, ACETAMINOPHEN AND CAFFEINE 50; 325; 40 MG/1; MG/1; MG/1
1 TABLET ORAL EVERY 6 HOURS PRN
Qty: 30 TABLET | Refills: 1 | Status: SHIPPED | OUTPATIENT
Start: 2018-04-30 | End: 2019-05-01 | Stop reason: SDUPTHER

## 2018-04-30 RX ORDER — ZOLMITRIPTAN 5 MG/1
5 TABLET, ORALLY DISINTEGRATING ORAL
Qty: 6 TABLET | Refills: 2 | Status: SHIPPED | OUTPATIENT
Start: 2018-04-30 | End: 2019-05-01 | Stop reason: SDUPTHER

## 2018-04-30 NOTE — PROGRESS NOTES
Judie Munoz  04/30/2018  [id number]    Teresa Flores MD  Patient Care Team:  Teresa Flores MD as PCP - General (Family Medicine)  Has the patient seen any provider outside of the [facility] network since the last visit? (no). If yes, HIPPA forms completed and records requested.        Visit Type:a scheduled routine follow-up visit    Chief Complaint:  Chief Complaint   Patient presents with    Medication Refill     been seeing black floating dots.       History of Present Illness:  37 year old here for BP check and medication refilled.  She has Dr. Flores listed as PCP. She has seen me for acute migraine, and has seen Dr. Guerrero.  She has been on Fiorciet for HA in past and is out of medication. She has Zomig.     Current BP meds are HCTZ. She has been out of BP meds for one month. Her BP elevated today in office.   She is due for annual exam.    She has seen Optho last May 17 for vision changes and her history of HA. She is again c/p vision change with floaters.    She is due for a follow up eye exam.  Denies CP or SOB.    Pap is up to date 2016  She has no flu or tdap on file.        History:  Past Medical History:   Diagnosis Date    Hypertension      Past Surgical History:   Procedure Laterality Date    D&C x 3       miscarriages     Family History   Problem Relation Age of Onset    Heart disease Mother     Hypertension Mother     Diabetes Father     Cancer Maternal Aunt     Diabetes Paternal Aunt     Diabetes Maternal Grandmother     Heart disease Maternal Grandmother     Kidney disease Maternal Grandmother      Social History     Social History    Marital status: Single     Spouse name: N/A    Number of children: 2    Years of education: N/A     Occupational History     [employer]     Social History Main Topics    Smoking status: Never Smoker    Smokeless tobacco: Never Used    Alcohol use No    Drug use: No    Sexual activity: Not Currently     Partners: Male     Other  Topics Concern    Not on file     Social History Narrative    No narrative on file     Patient Active Problem List   Diagnosis    Hypertension    Migraines     Review of patient's allergies indicates:  No Known Allergies    The following were reviewed at this visit: active problem list, medication list, allergies, family history, social history, and health maintenance.    Medications:  Current Outpatient Prescriptions on File Prior to Visit   Medication Sig Dispense Refill    butalbital-acetaminophen-caffeine -40 mg (FIORICET, ESGIC) -40 mg per tablet every 6 (six) hours as needed.  0    hydroCHLOROthiazide (HYDRODIURIL) 25 MG tablet TAKE 1 TABLET BY MOUTH ONCE DAILY 30 tablet 1    POP-BE 0.35 mg tablet TK 1 T PO D  11    venlafaxine (EFFEXOR-XR) 75 MG 24 hr capsule TK 1 C PO HS  3    zolmitriptan (ZOMIG-ZMT) 5 MG disintegrating tablet Take 1 tablet (5 mg total) by mouth as needed. 6 tablet 2     No current facility-administered medications on file prior to visit.        Medications have been reviewed and reconciled with patient at this visit.  Barriers to medications present (no)    Adverse reactions to current medications (no)    Over the counter medications reviewed (Yes ), and if needed added to active Medication list at this visit.     Exam:  Wt Readings from Last 3 Encounters:   04/30/18 104.4 kg (230 lb 2.6 oz)   11/01/17 105 kg (231 lb 8 oz)   07/11/17 103.2 kg (227 lb 8.2 oz)     Temp Readings from Last 3 Encounters:   04/30/18 97.9 °F (36.6 °C) (Tympanic)   11/01/17 98.3 °F (36.8 °C) (Tympanic)   07/11/17 98.4 °F (36.9 °C) (Tympanic)     BP Readings from Last 3 Encounters:   04/30/18 (!) 140/98   11/01/17 (!) 130/96   07/11/17 126/80     Pulse Readings from Last 3 Encounters:   04/30/18 84   11/01/17 87   07/11/17 98     Body mass index is 36.05 kg/m².    Review of Systems   Constitutional: Negative.  Negative for chills and fever.   HENT: Negative.  Negative for congestion, sinus  pain and sore throat.    Eyes: Negative for blurred vision and double vision.        Floaters, dots     Respiratory: Negative for cough, sputum production, shortness of breath and wheezing.    Cardiovascular: Negative for chest pain, palpitations and leg swelling.   Gastrointestinal: Negative for abdominal pain, constipation, diarrhea, heartburn, nausea and vomiting.   Genitourinary: Negative.    Musculoskeletal: Negative.    Skin: Negative.  Negative for rash.   Neurological: Positive for headaches.   Endo/Heme/Allergies: Negative.  Negative for polydipsia. Does not bruise/bleed easily.   Psychiatric/Behavioral: Negative for depression and substance abuse.         Physical Exam   Constitutional: She is oriented to person, place, and time. She appears well-developed and well-nourished. No distress.   HENT:   Head: Normocephalic and atraumatic.   Right Ear: External ear normal.   Left Ear: External ear normal.   Nose: Nose normal.   Mouth/Throat: Oropharynx is clear and moist. No oropharyngeal exudate.   Eyes: Conjunctivae and EOM are normal. Pupils are equal, round, and reactive to light. Right eye exhibits no discharge. Left eye exhibits no discharge.   Neck: Normal range of motion. Neck supple. No thyromegaly present.   Cardiovascular: Normal rate, regular rhythm, normal heart sounds and intact distal pulses.    No murmur heard.  Pulmonary/Chest: Effort normal and breath sounds normal. No respiratory distress. She has no wheezes.   Abdominal: Soft. Bowel sounds are normal. She exhibits no distension and no mass. There is no tenderness.   Musculoskeletal: Normal range of motion. She exhibits no edema.   Lymphadenopathy:     She has no cervical adenopathy.   Neurological: She is alert and oriented to person, place, and time. No cranial nerve deficit.   Skin: Capillary refill takes less than 2 seconds. She is not diaphoretic.   Psychiatric: She has a normal mood and affect. Her behavior is normal. Judgment and  thought content normal.   Nursing note and vitals reviewed.      Laboratory Reviewed ({N/A)  Lab Results   Component Value Date    WBC 9.38 04/24/2017    HGB 13.1 04/24/2017    HCT 40.5 04/24/2017     04/24/2017    CHOL 164 04/24/2017    TRIG 68 04/24/2017    HDL 48 04/24/2017    ALT 11 04/24/2017    AST 15 04/24/2017     04/24/2017    K 3.8 04/24/2017     04/24/2017    CREATININE 0.8 04/24/2017    BUN 12 04/24/2017    CO2 27 04/24/2017    TSH 1.099 04/24/2017    HGBA1C 5.8 04/24/2017       Assessment:  The primary encounter diagnosis was Annual physical exam. A diagnosis of Essential hypertension was also pertinent to this visit.     Plan     Annual physical exam  -     (In Office Administered) Tdap Vaccine  -     Comprehensive metabolic panel; Future; Expected date: 04/30/2018    Essential hypertension  -     Comprehensive metabolic panel; Future; Expected date: 04/30/2018  -     NURSING COMMUNICATION: Create [employer] Account  -     Hypertension Digital Medicine (HDMP) Enrollment Order  -     Hypertension Digital Medicine (HDMP): Assign Onboarding Questionnaires        Care Plan/Goals: Reviewed  (N/A)  Goals     None          Follow up: No Follow-up on file.    After visit summary was printed and given to patient upon discharge today.  Patient goals and care plan are included in After Visit Summary.

## 2018-05-01 ENCOUNTER — PATIENT OUTREACH (OUTPATIENT)
Dept: OTHER | Facility: OTHER | Age: 38
End: 2018-05-01

## 2018-05-01 NOTE — LETTER
Malissa Becerra, PharmD  [address] [place] [address]     Dear Judie Munoz,    Welcome to the [facility]!         My name is Malissa Becerra PharmD and I am your dedicated Digital Medicine clinician.  As an expert in medication management, I will help ensure that the medications you are taking continue to provide you with the intended benefits.      I am Mady Guzman and I will be your health  for the duration of the program.  My  job is to help you identify lifestyle changes to improve your blood pressure control.  We will talk about nutrition, exercise, and other ways that you may be able to adjust your current habits to better your health. Together, we will work to improve your overall health and encourage you to meet your goals for a healthier lifestyle.    What we expect from YOU:    You will need to take blood pressure readings multiple times a week and no less than one reading per week.   It is important that you take your measurements at different times during the day, when possible.     What you should expect from your Digital Medicine Care Team:   We will provide you with education about high blood pressure, including lifestyle changes that could help you to control your blood pressure.   We will review your weekly readings and provide you with monthly blood pressure progress reports after you have been in the program for more than 30 days.   We will send monthly progress reports on your blood pressure control to your physician so they can follow along with your progress as well.    You will be able to reach me by phone at [phone] or through your [facility] account by clicking my name under Care Team on the right side of the home screen.    I look forward to working with you to achieve your blood pressure goals!    Sincerely,    Malissa Becerra PharmD  Your personal clinician    Please visit  [email or web address] to learn more about high blood pressure and what you can do lower your blood pressure.                                                                                           Judie Munoz  [address] [place] [address]

## 2018-05-01 NOTE — PROGRESS NOTES
Last 5 Patient Entered Readings                                      Current 30 Day Average: 142/93     Recent Readings 5/1/2018 4/30/2018 4/30/2018    SBP (mmHg) 139 144 153    DBP (mmHg) 93 93 110    Pulse 82 91 87        Ms. Judie Munoz is a 37 y.o. female who is newly enrolled in the [facility].     The following information was reviewed/updated:  Preferred pharmacy   [facility] [address] [place] [address] [place] - [address] [facility]  [address] [place] [address]  Phone: [phone] Fax: [phone]    Patient prefers a 90 days supply  Review of patient's allergies indicates:  No Known Allergies  Current Outpatient Prescriptions on File Prior to Visit   Medication Sig Dispense Refill    butalbital-acetaminophen-caffeine -40 mg (FIORICET, ESGIC) -40 mg per tablet Take 1 tablet by mouth every 6 (six) hours as needed. 30 tablet 1    hydroCHLOROthiazide (HYDRODIURIL) 25 MG tablet Take 1 tablet (25 mg total) by mouth once daily. 90 tablet 1    ZOLMitriptan (ZOMIG-ZMT) 5 MG disintegrating tablet Take 1 tablet (5 mg total) by mouth as needed. 6 tablet 2    [DISCONTINUED] POP-BE 0.35 mg tablet TK 1 T PO D  11    [DISCONTINUED] venlafaxine (EFFEXOR-XR) 75 MG 24 hr capsule TK 1 C PO HS  3     No current facility-administered medications on file prior to visit.        IF DEPRESSED    Responses to the depression screening suggest patient is having depressive symptoms. Patient is followed for this by her PCP and is not interested in referral.    Ritika Tong MD indicated she would like to see patient / have patient  referred to a specialist for further evaluation.     IF DUYEN screen positive    DUYEN screening results for this patient suggest a high likelihood of sleep apnea, which can contribute to hypertension. Patient has not been previously diagnosed with sleep apnea and is interested in referral at this time. I am sending a  referral to [facility] for DUYEN.    Ritika Tong MD indicated she would like to see patient / have patient  referred to a specialist for further evaluation.     Reviewed non-pharmacologic therapies and impact on BP:    1. Low-sodium diet- 11 mmHg reduction 2-4 weeks. I have reviewed [facility].A.S.[facility] diet sodium restrictions (<2000mg/day). Patient does not monitor her sodium intake. She does not add salt to her foods after cooking. She uses minimal salt when cooking.  2. Exercise- 7 mmHg reduction 4-12 weeks. I have recommended patient engage in exercise as tolerated at least 30 minutes 5x per week to improve cardiovascular health. Patient is not exercising at this time.    3. Alcohol intake- 3 mmHg reduction 4-12 weeks. I have discussed with patient the maximum recommended number of 1 drink(s) per day for men/women. Patient does not consume alcohol.    Explained that we expect patient to obtain several blood pressures per week at random times of day.   Our goal is to get  BP to consistently below 130/80mmHg and make the process convenient so patient can avoid extra trips to the office. Getting your blood pressure below 130/80mmHg (definition of control) will reduce your risk for heart attack, kidney failure, stroke and death (as well as kidney failure, eye disease, & dementia).     Patient is not meeting the goal already.   When asked what the patient thinks is causing BP to be elevated, she states: sodium intake.    Instructed patient not to allow anyone else to use phone and BP cuff.   I'm not available for emergencies. Patient will call [facility] on-call ([phone] or [phone]) or [phone] if needed.     Discussed appropriate BP measuring technique:  Before taking your blood pressure  Find a quiet place. You will need to listen for your heartbeat.  Roll up the sleeve on your left arm or remove any tight-sleeved clothing, if needed. (It's best to take your blood pressure from your left arm if you are right-handed.You can  use the other arm if you have been told by your health care provider to do so.)  Rest in a chair next to a table for 5 to 10 minutes. (Your left arm should rest comfortably at heart level.)  Sit up straight with your back against the chair, legs uncrossed and on the ground.  Rest your forearm on the table with the palm of your hand facing up.  You should not talk, read the newspaper, or watch television during this process.      Patient and I agreed that she will continue to monitor blood pressure and sodium intake, and continue to remain adherent to medications.     I will plan to follow-up with the patient in 1 weeks.   Emailed patient link to [facility] HT[facility] webpage and my contact information in case she has any questions.       Last 5 Patient Entered Readings                                      Current 30 Day Average: 142/93     Recent Readings 5/1/2018 4/30/2018 4/30/2018    SBP (mmHg) 139 144 153    DBP (mmHg) 93 93 110    Pulse 82 91 87        38 yo female with a PMH of migraine and HTN. I sent a referral to [facility] sleep clinic for evaluation for DUYEN.

## 2018-05-08 ENCOUNTER — PATIENT OUTREACH (OUTPATIENT)
Dept: OTHER | Facility: OTHER | Age: 38
End: 2018-05-08

## 2018-05-08 DIAGNOSIS — I10 ESSENTIAL HYPERTENSION: Primary | ICD-10-CM

## 2018-05-08 NOTE — PROGRESS NOTES
Last 5 Patient Entered Readings                                      Current 30 Day Average: 135/87     Recent Readings 5/6/2018 5/6/2018 5/4/2018 5/3/2018 5/3/2018    SBP (mmHg) 140 145 134 126 131    DBP (mmHg) 85 113 85 89 82    Pulse 82 83 104 83 86        5/10: Patient's BP average is above goal of <130/80.     Patient denies s/s of hypotension (lightheadedness, dizziness, nausea, fatigue) associated with low readings. Instructed patient to inform me if this occurs, patient confirms understanding.      Patient denies s/s of hypertension (SOB, CP, severe headaches, changes in vision) associated with high readings. Instructed patient to go to the ED if BP > 180/110 and accompanied by hypertensive s/s, patient confirms understanding.    Will continue to monitor regularly. Will follow up in 2-3 weeks, sooner if BP begins to trend upward or downward.    Patient has my contact information and knows to call with any concerns or clinical changes.     Current HTN regimen:  Hypertension Medications             amLODIPine (NORVASC) 5 MG tablet Take 1 tablet (5 mg total) by mouth once daily.    hydroCHLOROthiazide (HYDRODIURIL) 25 MG tablet Take 1 tablet (25 mg total) by mouth once daily.      She will begin amlodipine 5mg daily 5/10. I am hoping to have better diastolic control.

## 2018-05-10 RX ORDER — AMLODIPINE BESYLATE 5 MG/1
5 TABLET ORAL DAILY
Qty: 30 TABLET | Refills: 3 | Status: SHIPPED | OUTPATIENT
Start: 2018-05-10 | End: 2018-10-23 | Stop reason: SDUPTHER

## 2018-05-24 ENCOUNTER — PATIENT OUTREACH (OUTPATIENT)
Dept: OTHER | Facility: OTHER | Age: 38
End: 2018-05-24

## 2018-05-24 NOTE — LETTER
Malissa Becerar, PharmD  [address] [place] [address]     Dear Judie,    We have made several attempts to encourage your participation in Digital Medicine monitoring. Unfortunately, we have been unsuccessful and this is an official notice that you are no longer enrolled in Hypertension Digital Medicine Program, and thus, we will no longer be managing your hypertension.    Please note this has no impact on your relationship with [facility] or your providers. Going forward, please reach out to your primary care provider with any questions or concerns regarding your health.                         Please contact [phone] if you have any additional questions.     Sincerely,    The [facility] Digital [facility] Team                                                                                                                                            Judie Munoz  [address] [place] [address]

## 2018-05-24 NOTE — PROGRESS NOTES
Last 5 Patient Entered Readings                                      Current 30 Day Average: 134/86     Recent Readings 5/20/2018 5/19/2018 5/16/2018 5/15/2018 5/12/2018    SBP (mmHg) 141 125 121 114 137    DBP (mmHg) 79 85 76 82 94    Pulse 87 80 84 84 93       LMFCB. Blood pressure is trending down nicely. She will continue her current medications for follow-up in another week. If she remains on this trend, she may not require additional medications.    7/20: Sent enrolling MD noncompliance message.    8/6: Certified discharge letter sent.    8/27: Patient discharged for noncompliance.

## 2018-05-24 NOTE — LETTER
Malissa Becerra, PharmD  [address] [place] [address]     Dear Judie,    Thank you for enrolling in the [facility] Hypertension Digital Medicine Program. To participate in our program, we ask that you submit a blood pressure reading at least once weekly through your [facility] Account and maintain regular contact with your Care Team.  We have not received any data or heard from you in some time.     The Digital Medicine Care Team has attempted to reach you on multiple occasions to determine if you would like to continue participating in the program. While we encourage you to continue participating fully, we understand that circumstances may change.      To continue participating in the program, please contact me at [phone]. If we do not hear back, you will be un-enrolled and your physician will be notified of your decision.    If you have submitted blood pressure readings during the past 14 days and believe you are receiving this letter in error, please call the Digital Medicine Patient Support Line at [phone] for troubleshooting.      We look forward to hearing from you soon.    Sincerely,     Malissa Becerra, PharmD  Your Personal Clinical Pharmacist                                                                                                                        Judie Munoz  [address] [place] [address]

## 2018-05-29 ENCOUNTER — PATIENT OUTREACH (OUTPATIENT)
Dept: OTHER | Facility: OTHER | Age: 38
End: 2018-05-29

## 2018-05-29 NOTE — PROGRESS NOTES
"Last 5 Patient Entered Readings                                      Current 30 Day Average: 132/85     Recent Readings 5/25/2018 5/20/2018 5/19/2018 5/16/2018 5/15/2018    SBP (mmHg) 118 141 125 121 114    DBP (mmHg) 70 79 85 76 82    Pulse 95 87 80 84 84          Digital Medicine: Health  Follow Up    Lifestyle Modifications:    1.Dietary Modifications (Sodium intake <2,000mg/day, food labels, dining out): Reports she has been utilizing meal plan and trying to reduce sodium. Notes she could still improve in this area, and is slowly focusing on making changes. Praised for progress thus far and encouraged her to continue to read labels. At next call, continue to emphasize importance of whole foods.     2.Physical Activity: Reports she has implemented work boundaries for herself, specifically that when it's her time to leave work she is able to go. Reports that this has helped her to get 30 minutes of exercise at least 3 days per week and that this is something she'd like to continue. Notes that sometimes she will exercise and then return to work, but that having this boundary has been helpful for her.     3.Medication Therapy: Patient has been compliant with the medication regimen. Reports this as a strength of hers over past couple weeks.     4.Patient has the following medication side effects/concerns: Reports she is very pleased with medications and has no concerns. States she is feeling better overall and no longer has "floating black dots" in her vision or with headaches.     Follow up with Ms. Judie Munoz completed. No further questions or concerns. Will continue follow up in 2 week increments to achieve health goals per patient request.     "

## 2018-06-14 ENCOUNTER — OFFICE VISIT (OUTPATIENT)
Dept: INTERNAL MEDICINE | Facility: CLINIC | Age: 38
End: 2018-06-14
Payer: COMMERCIAL

## 2018-06-14 VITALS
WEIGHT: 228.81 LBS | DIASTOLIC BLOOD PRESSURE: 86 MMHG | OXYGEN SATURATION: 98 % | TEMPERATURE: 98 F | HEIGHT: 67 IN | BODY MASS INDEX: 35.91 KG/M2 | HEART RATE: 101 BPM | SYSTOLIC BLOOD PRESSURE: 132 MMHG

## 2018-06-14 DIAGNOSIS — R31.29 MICROSCOPIC HEMATURIA: Primary | ICD-10-CM

## 2018-06-14 DIAGNOSIS — R30.0 DYSURIA: Primary | ICD-10-CM

## 2018-06-14 LAB
BACTERIA #/AREA URNS HPF: ABNORMAL /HPF
BILIRUB UR QL STRIP: NEGATIVE
CLARITY UR: ABNORMAL
COLOR UR: YELLOW
GLUCOSE UR QL STRIP: NEGATIVE
HGB UR QL STRIP: ABNORMAL
KETONES UR QL STRIP: NEGATIVE
LEUKOCYTE ESTERASE UR QL STRIP: NEGATIVE
MICROSCOPIC COMMENT: ABNORMAL
NITRITE UR QL STRIP: NEGATIVE
PH UR STRIP: 6.5 [PH] (ref 5–8)
PROT UR QL STRIP: ABNORMAL
RBC #/AREA URNS HPF: 12 /HPF (ref 0–4)
SP GR UR STRIP: 1.01 (ref 1–1.03)
SQUAMOUS #/AREA URNS HPF: 8 /HPF
URN SPEC COLLECT METH UR: ABNORMAL
WBC #/AREA URNS HPF: 2 /HPF (ref 0–5)

## 2018-06-14 PROCEDURE — 3075F SYST BP GE 130 - 139MM HG: CPT | Mod: CPTII,S$GLB,, | Performed by: FAMILY MEDICINE

## 2018-06-14 PROCEDURE — 3008F BODY MASS INDEX DOCD: CPT | Mod: CPTII,S$GLB,, | Performed by: FAMILY MEDICINE

## 2018-06-14 PROCEDURE — 99213 OFFICE O/P EST LOW 20 MIN: CPT | Mod: S$GLB,,, | Performed by: FAMILY MEDICINE

## 2018-06-14 PROCEDURE — 3079F DIAST BP 80-89 MM HG: CPT | Mod: CPTII,S$GLB,, | Performed by: FAMILY MEDICINE

## 2018-06-14 PROCEDURE — 81000 URINALYSIS NONAUTO W/SCOPE: CPT

## 2018-06-14 PROCEDURE — 87086 URINE CULTURE/COLONY COUNT: CPT

## 2018-06-14 PROCEDURE — 99999 PR PBB SHADOW E&M-EST. PATIENT-LVL IV: CPT | Mod: PBBFAC,,, | Performed by: FAMILY MEDICINE

## 2018-06-14 RX ORDER — CIPROFLOXACIN 500 MG/1
500 TABLET ORAL 2 TIMES DAILY
Qty: 6 TABLET | Refills: 0 | Status: SHIPPED | OUTPATIENT
Start: 2018-06-14 | End: 2018-06-17

## 2018-06-14 NOTE — PROGRESS NOTES
Judie Munoz  06/14/2018  [id number]    Ritika Tong MD  Patient Care Team:  Ritika Tong MD as PCP - General (Family Medicine)  Mailssa Becerra, PharmD as Hypertension Digital Medicine Clinician (Pharmacist)  Mady Guzman as Digital Medicine Health   Teresa Flores MD as Hypertension Digital Medicine Responsible Provider (Family Medicine)  Has the patient seen any provider outside of the [facility] network since the last visit? (no). If yes, HIP PA forms completed and records requested.        Visit Type:an urgent visit for a new problem    Chief Complaint:  Chief Complaint   Patient presents with    Urinary Tract Infection     noticed sunday       History of Present Illness:  37 year old her for UTI sx.    She started with sx this past weekend  She has lower abd pain, suprapubic  She reports she has urgency.  She also reports hesitancy.      History:  Past Medical History:   Diagnosis Date    Hypertension      Past Surgical History:   Procedure Laterality Date    D&C x 3       miscarriages     Family History   Problem Relation Age of Onset    Heart disease Mother     Hypertension Mother     Diabetes Father     Cancer Maternal Aunt     Diabetes Paternal Aunt     Diabetes Maternal Grandmother     Heart disease Maternal Grandmother     Kidney disease Maternal Grandmother      Social History     Social History    Marital status: Single     Spouse name: N/A    Number of children: 2    Years of education: N/A     Occupational History     [employer]     Social History Main Topics    Smoking status: Never Smoker    Smokeless tobacco: Never Used    Alcohol use No    Drug use: No    Sexual activity: Not Currently     Partners: Male     Other Topics Concern    Not on file     Social History Narrative    No narrative on file     Patient Active Problem List   Diagnosis    Hypertension    Migraines     Review of patient's allergies indicates:  No Known  Allergies    The following were reviewed at this visit: active problem list, medication list, allergies, family history, social history, and health maintenance.    Medications:  Current Outpatient Prescriptions on File Prior to Visit   Medication Sig Dispense Refill    amLODIPine (NORVASC) 5 MG tablet Take 1 tablet (5 mg total) by mouth once daily. 30 tablet 3    butalbital-acetaminophen-caffeine -40 mg (FIORICET, ESGIC) -40 mg per tablet Take 1 tablet by mouth every 6 (six) hours as needed. 30 tablet 1    hydroCHLOROthiazide (HYDRODIURIL) 25 MG tablet Take 1 tablet (25 mg total) by mouth once daily. 90 tablet 1    levonorgestrel (MIRENA IU) by Intrauterine route.      ZOLMitriptan (ZOMIG-ZMT) 5 MG disintegrating tablet Take 1 tablet (5 mg total) by mouth as needed. 6 tablet 2     No current facility-administered medications on file prior to visit.        Medications have been reviewed and reconciled with patient at this visit.  Barriers to medications present (no)    Adverse reactions to current medications (no)    Over the counter medications reviewed (Yes ), and if needed added to active Medication list at this visit.     Exam:  Wt Readings from Last 3 Encounters:   06/14/18 103.8 kg (228 lb 13.4 oz)   04/30/18 104.4 kg (230 lb 2.6 oz)   11/01/17 105 kg (231 lb 8 oz)     Temp Readings from Last 3 Encounters:   06/14/18 98.3 °F (36.8 °C) (Tympanic)   04/30/18 97.9 °F (36.6 °C) (Tympanic)   11/01/17 98.3 °F (36.8 °C) (Tympanic)     BP Readings from Last 3 Encounters:   06/14/18 132/86   04/30/18 (!) 140/98   11/01/17 (!) 130/96     Pulse Readings from Last 3 Encounters:   06/14/18 101   04/30/18 84   11/01/17 87     Body mass index is 35.84 kg/m².    Review of Systems   Constitutional: Negative.  Negative for chills and fever.   HENT: Negative.  Negative for congestion, sinus pain and sore throat.    Eyes: Negative for blurred vision and double vision.   Respiratory: Negative for cough, sputum  production, shortness of breath and wheezing.    Cardiovascular: Negative for chest pain, palpitations and leg swelling.   Gastrointestinal: Negative for abdominal pain, constipation, diarrhea, heartburn, nausea and vomiting.   Genitourinary: Positive for dysuria and urgency.   Musculoskeletal: Negative.    Skin: Negative.  Negative for rash.   Neurological: Negative.    Endo/Heme/Allergies: Negative.  Negative for polydipsia. Does not bruise/bleed easily.   Psychiatric/Behavioral: Negative for depression and substance abuse.         Physical Exam   Constitutional: She is oriented to person, place, and time. She appears well-developed and well-nourished. No distress.   HENT:   Head: Normocephalic and atraumatic.   Right Ear: External ear normal.   Left Ear: External ear normal.   Nose: Nose normal.   Mouth/Throat: Oropharynx is clear and moist. No oropharyngeal exudate.   Eyes: Conjunctivae and EOM are normal. Pupils are equal, round, and reactive to light. Right eye exhibits no discharge. Left eye exhibits no discharge.   Neck: Normal range of motion. Neck supple. No thyromegaly present.   Cardiovascular: Normal rate, regular rhythm, normal heart sounds and intact distal pulses.    No murmur heard.  Pulmonary/Chest: Effort normal and breath sounds normal. No respiratory distress. She has no wheezes.   Abdominal: Soft. Bowel sounds are normal. She exhibits no distension and no mass. There is no tenderness.   Musculoskeletal: Normal range of motion. She exhibits no edema.   Lymphadenopathy:     She has no cervical adenopathy.   Neurological: She is alert and oriented to person, place, and time. No cranial nerve deficit.   Skin: Capillary refill takes less than 2 seconds. She is not diaphoretic.   Psychiatric: She has a normal mood and affect. Her behavior is normal. Judgment and thought content normal.   Nursing note and vitals reviewed.      Laboratory Reviewed ({Yes)  Lab Results   Component Value Date    WBC 9.38  04/24/2017    HGB 13.1 04/24/2017    HCT 40.5 04/24/2017     04/24/2017    CHOL 164 04/24/2017    TRIG 68 04/24/2017    HDL 48 04/24/2017    ALT 12 04/30/2018    AST 13 04/30/2018     04/30/2018    K 3.7 04/30/2018     04/30/2018    CREATININE 0.7 04/30/2018    BUN 11 04/30/2018    CO2 27 04/30/2018    TSH 1.099 04/24/2017    HGBA1C 5.8 04/24/2017   urine with microscopic blood      Assessment:  The encounter diagnosis was Dysuria.     Plan     Dysuria  -     Urinalysis  -     Urine culture  -     ciprofloxacin HCl (CIPRO) 500 MG tablet; Take 1 tablet (500 mg total) by mouth 2 (two) times daily.  Dispense: 6 tablet; Refill: 0    Other orders  -     Urinalysis Microscopic    Recheck UA 1 week after tx      Care Plan/Goals: Reviewed  (N/A)  Goals        Patient Stated     Blood Pressure < 130/80 (pt-stated)        Other     Exercise at least 150 minutes per week.            Keep up the great work with getting 30 min of exercise most days per week!        Maintain a low sodium diet            Aim for less than 2000 mg sodium per day and look for items with less than 140 mg sodium per serving       Take at least one BP reading per week at various times of the day      Weight < 0 kg           Follow up: Follow-up in about 1 month (around 7/14/2018), or if symptoms worsen or fail to improve.    After visit summary was printed and given to patient upon discharge today.  Patient goals and care plan are included in After Visit Summary.

## 2018-06-14 NOTE — PATIENT INSTRUCTIONS
"  Dysuria     Painful urination (dysuria) is often caused by a problem in the urinary tract.   Dysuria is pain felt during urination. It is often described as a burning. Learn more about this problem and how it can be treated.  What causes dysuria?  Possible causes include:  · Infection with a bacteria or virus such as a urinary tract infection (UTI or a sexually transmitted infection (STI)  · Sensitivity or allergy to chemicals such as those found in lotions and other products  · Prostate or bladder problems  · Radiation therapy to the pelvic area  How is dysuria diagnosed?  Your healthcare provider will examine you. He or she will ask about your symptoms and health. After talking with you and doing a physical exam, your healthcare provider may know what is causing your dysuria. He or she will usually request  a sample of your urine. Tests of your urine, or a "urinalysis," are done. A urinalysis may include:  · Looking at the urine sample (visual exam)  · Checking for substances (chemical exam)  · Looking at a small amount under a microscope (microscopic exam)  Some parts of the urinalysis may be done in the provider's office and some in a lab. And, the urine sample may be checked for bacteria and yeast (urine culture). Your healthcare provider will tell you more about these tests if they are needed.  How is dysuria treated?  Treatment depends on the cause. If you have a bacterial infection, you may need antibiotics. You may be given medicines to make it easier for you to urinate and help relieve pain. Your healthcare provider can tell you more about your treatment options. Untreated, symptoms may get worse.  When to call your healthcare provider  Call the healthcare provider right away if you have any of the following:  · Fever of 100.4°F (38°C) or higher   · No improvement after three days of treatment  · Trouble urinating because of pain  · New or increased discharge from the vagina or penis  · Rash or joint " pain  · Increased back or abdominal pain  · Enlarged painful lymph nodes (lumps) in the groin   Date Last Reviewed: 1/1/2017  © 2831-9866 [employer]. [address] [place] [address], [place] [address]. All rights reserved. This information is not intended as a substitute for professional medical care. Always follow your healthcare professional's instructions.

## 2018-06-14 NOTE — PROGRESS NOTES
Urine did show blood microscopic.  TAke the anitbotic.  I will need her to return for UA in 1 week to make sure clears.

## 2018-06-15 ENCOUNTER — PATIENT OUTREACH (OUTPATIENT)
Dept: OTHER | Facility: OTHER | Age: 38
End: 2018-06-15

## 2018-06-15 LAB — BACTERIA UR CULT: NORMAL

## 2018-06-15 NOTE — PROGRESS NOTES
Last 5 Patient Entered Readings                                      Current 30 Day Average: 122/78     Recent Readings 6/2/2018 5/25/2018 5/20/2018 5/19/2018 5/16/2018    SBP (mmHg) 106 118 141 125 121    DBP (mmHg) 79 70 79 85 76    Pulse 86 95 87 80 84            Digital Medicine: Health  Follow Up    Unable to LVM (patient has VM that has not been set up) to follow up with Ms. Judie Munoz.  Current BP average 122/78 mmHg is at goal, 130/80 mmHg and has improved from average on 5/29 of 132/85 mmHg. Will try again in one week.

## 2018-06-18 ENCOUNTER — PATIENT MESSAGE (OUTPATIENT)
Dept: INTERNAL MEDICINE | Facility: CLINIC | Age: 38
End: 2018-06-18

## 2018-06-18 RX ORDER — PHENAZOPYRIDINE HYDROCHLORIDE 200 MG/1
200 TABLET, FILM COATED ORAL
Qty: 6 TABLET | Refills: 0 | Status: SHIPPED | OUTPATIENT
Start: 2018-06-18 | End: 2018-06-20

## 2018-06-27 NOTE — PROGRESS NOTES
Last 5 Patient Entered Readings                                      Current 30 Day Average: 118/76     Recent Readings 6/21/2018 6/20/2018 6/2/2018 5/25/2018 5/20/2018    SBP (mmHg) 130 118 106 118 141    DBP (mmHg) 73 75 79 70 79    Pulse 96 89 86 95 87          Digital Medicine: Health  Follow Up    Lifestyle Modifications:    1.Dietary Modifications (Sodium intake <2,000mg/day, food labels, dining out): Patient reports meal prep and reducing dining out have been strengths of hers the past three weeks. Continues to monitor sodium and would like to start focusing on integrating time for exercise.     2.Physical Activity: Would like to set a goal to walk twice each week for the next three weeks. Admits work schedule to be a barrier and is trying to find ways to adjust her schedule to allow for more time for walking. Will follow up on this goal in 3 weeks per her request.     3.Medication Therapy: Patient has been compliant with the medication regimen.    4.Patient has the following medication side effects/concerns: No concerns/side effects per patient report.   (Frequency/Alleviating factors/Precipitating factors, etc.)     Follow up with Ms. Judie Munoz completed. No further questions or concerns. Will continue follow up in 3 weeks to achieve health goals.

## 2018-07-24 ENCOUNTER — PATIENT OUTREACH (OUTPATIENT)
Dept: OTHER | Facility: OTHER | Age: 38
End: 2018-07-24

## 2018-07-24 NOTE — LETTER
Malissa Becerra, PharmD  [address] [place] [address]     Dear Judie,    We have made several attempts to encourage your participation in Digital Medicine monitoring. Unfortunately, we have been unsuccessful and this is an official notice that you are no longer enrolled in Hypertension Digital Medicine Program, and thus, we will no longer be managing your hypertension.    Please note this has no impact on your relationship with [facility] or your providers. Going forward, please reach out to your primary care provider with any questions or concerns regarding your health.                         Please contact [phone] if you have any additional questions.     Sincerely,    [facility] Digital [facility] Team                                                                                                                                            Judie Munoz  [address] [facility] [address] [place] [address]

## 2018-07-24 NOTE — PROGRESS NOTES
Last 5 Patient Entered Readings                                      Current 30 Day Average:      Recent Readings 6/21/2018 6/20/2018 6/2/2018 5/25/2018 5/20/2018    SBP (mmHg) 130 118 106 118 141    DBP (mmHg) 73 75 79 70 79    Pulse 96 89 86 95 87          Patient enrolling MD messaged as patient has not been compliant with regular readings nor responsive to PharmD Colosino outreach. Will try again in 2 weeks per protocol.     8/6 PharmNATHANAEL Colosino attempted outreach. Certified DC letter sent today.

## 2018-08-01 NOTE — PROGRESS NOTES
Last 5 Patient Entered Readings                                      Current 30 Day Average: 135/87     Recent Readings 5/6/2018 5/6/2018 5/4/2018 5/3/2018 5/3/2018    SBP (mmHg) 140 145 134 126 131    DBP (mmHg) 85 113 85 89 82    Pulse 82 83 104 83 86          Digital Medicine: Health  Introduction    Introduced Ms. Judie Munoz to Digital Medicine. Discussed health  role and recommended lifestyle modifications.    Lifestyle Assessment:  Current Dietary Habits(i.e. low sodium, food labels, dining out): States that since last week she has started monitoring sodium by preparing both breakfast and lunch at home rather than dining out. States for breakfast she usually has yogurt, eggs, and/or perez and that for lunch she has been doing a salad with baked chicken. Drinks mostly water (~4 bottles per day) and some soda, specifically Coca-Cola to assist with migraines. Notes she does use salted seasonings, and requests further options and resources for meal planning to be sent via e-mail. States for dinner she will usually have a starch such as bread or a potato, meat, and other vegetables. Discussed benefits of continued sodium monitoring for overall health and encouraged patient to continue. Will want to continue to build on healthy lifestyle changes, focusing on small healthy swaps over time.     Exercise: Spends 1/2 of most days at desk/computer however notes integrating movement is a goal of hers. Reports work/school schedules present a challenge with scheduling, but that she interested in finding ways to make movement a more regular part of her days.     Alcohol/Tobacco: Deferred.     Medication Adherence: has been compliant with the medicaiton regimen However is reporting fatigue since resuming taking medications. No other side effects/symptoms were noted.     Other goals: States her goals are to better manage her blood pressure, improve nutrition to include less carbohydrates and sodium, and to  integrate more regular movement. Requests follow up in 2 weeks. No further questions/concerns at this time.     Reviewed AHA/AACE recommendations:  Limit sodium intake to <2000mg/day  Perform 150 minutes of physical activity per week    Reviewed the importance of self-monitoring, medication adherence, and that the health  can be used as a resource for lifestyle modifications to help reduce or maintain a healthy lifestyle.  Reviewed that the Digital Medicine team is not available for emergencies and instructed the patient to call 911 or [facility] ([phone] or [phone]) if one arises.       20

## 2018-08-27 NOTE — PROGRESS NOTES
Last 5 Patient Entered Readings                                      Current 30 Day Average:      Recent Readings 6/21/2018 6/20/2018 6/2/2018 5/25/2018 5/20/2018    SBP (mmHg) 130 118 106 118 141    DBP (mmHg) 73 75 79 70 79    Pulse 96 89 86 95 87          Patient discharged from [facility] due to noncompliance.

## 2018-09-10 ENCOUNTER — OFFICE VISIT (OUTPATIENT)
Dept: INTERNAL MEDICINE | Facility: CLINIC | Age: 38
End: 2018-09-10
Payer: COMMERCIAL

## 2018-09-10 VITALS
BODY MASS INDEX: 35.33 KG/M2 | SYSTOLIC BLOOD PRESSURE: 126 MMHG | DIASTOLIC BLOOD PRESSURE: 80 MMHG | HEART RATE: 100 BPM | HEIGHT: 67 IN | OXYGEN SATURATION: 98 % | WEIGHT: 225.06 LBS | TEMPERATURE: 99 F

## 2018-09-10 DIAGNOSIS — J02.9 SORE THROAT: Primary | ICD-10-CM

## 2018-09-10 DIAGNOSIS — Z20.818 EXPOSURE TO STREP THROAT: ICD-10-CM

## 2018-09-10 DIAGNOSIS — I10 ESSENTIAL HYPERTENSION: ICD-10-CM

## 2018-09-10 DIAGNOSIS — J02.0 STREP THROAT: ICD-10-CM

## 2018-09-10 PROCEDURE — 99999 PR PBB SHADOW E&M-EST. PATIENT-LVL III: CPT | Mod: PBBFAC,,, | Performed by: FAMILY MEDICINE

## 2018-09-10 PROCEDURE — 3008F BODY MASS INDEX DOCD: CPT | Mod: CPTII,S$GLB,, | Performed by: FAMILY MEDICINE

## 2018-09-10 PROCEDURE — 3074F SYST BP LT 130 MM HG: CPT | Mod: CPTII,S$GLB,, | Performed by: FAMILY MEDICINE

## 2018-09-10 PROCEDURE — 99213 OFFICE O/P EST LOW 20 MIN: CPT | Mod: S$GLB,,, | Performed by: FAMILY MEDICINE

## 2018-09-10 PROCEDURE — 3079F DIAST BP 80-89 MM HG: CPT | Mod: CPTII,S$GLB,, | Performed by: FAMILY MEDICINE

## 2018-09-10 RX ORDER — AMOXICILLIN 875 MG/1
875 TABLET, FILM COATED ORAL EVERY 12 HOURS
Qty: 20 TABLET | Refills: 0 | Status: SHIPPED | OUTPATIENT
Start: 2018-09-10 | End: 2018-12-30

## 2018-09-10 NOTE — LETTER
September 10, 2018      [facility]  [id number] [place] [address]  Phone: [phone]  Fax: [phone]       Patient: Judie Munoz   YOB: 1980  Date of Visit: 09/10/2018    To Whom It May Concern:    Katie Munoz  was at [facility] on 09/10/2018. She may return to work/school on 09/10/2018 with no restrictions. If you have any questions or concerns, or if I can be of further assistance, please do not hesitate to contact me.    Sincerely,    Ritika Tong MD

## 2018-09-10 NOTE — PATIENT INSTRUCTIONS
Taking Your Blood Pressure  Blood pressure is the force of blood against the artery wall as it moves from the heart through the blood vessels. You can take your own blood pressure reading using a digital monitor. Take your readings the same each time, using the same arm. Take readings as often as your healthcare provider instructs.  About blood pressure monitors  Blood pressure monitors are designed for certain ages and cases. You can find monitors for older adults, for pregnant women, and for children. Make sure the one you choose is the right one for your age and situation.  The American Heart Association recommends an automatic cuff monitor that fits on your upper arm (bicep). The cuff should fit your arm size. A cuff thats too large or too small will not give an accurate reading. Measure around your upper arm to find your size.  Monitors that attach to your finger or wrist are not as accurate as monitors for your upper arm.  Ask your healthcare provider for help in choosing a monitor. Bring your monitor to your next provider visit if you need help in using it the correct way.  The steps below are general instructions for using an automatic digital monitor.  Step 1. Relax    · Take your blood pressure at the same time every day, such as in the morning or evening, or at the time your healthcare provider recommends.  · Wait at least a half-hour after smoking, eating, or exercising. Don't drink coffee, tea, soda, or other caffeinated beverages before checking your blood pressure.  · Sit comfortably at a table with both feet on the floor. Do not cross your legs or feet. Place the monitor near you.  · Rest for a few minutes before you begin.  Step 2. Wrap the cuff    · Place your arm on the table, palm up. Your arm should be at the level of your heart. Wrap the cuff around your upper arm, just above your elbow. Its best done on bare skin, not over clothing. Most cuffs will indicate where the brachial artery (the  blood vessel in the middle of the arm at the inner side of the elbow) should line up with the cuff. Look in your monitor's instruction booklet for an illustration. You can also bring your cuff to your healthcare provider and have them show you how to correctly place the cuff.  Step 3. Inflate the cuff    · Push the button that starts the pump.  · The cuff will tighten, then loosen.  · The numbers will change. When they stop changing, your blood pressure reading will appear.  · Take 2 or 3 readings one minute apart.  Step 4. Write down the results of each reading    · Write down your blood pressure numbers for each reading. Note the date and time. Keep your results in one place, such as a notebook. Even if your monitor has a built-in memory, keep a hard copy of the readings.  · Remove the cuff from your arm. Turn off the machine.  · Bring your blood pressure records with your healthcare providers at each visit.  · If you start a new blood pressure medicine, note the day you started the new medicine. Also note the day if you change the dose of your medicine. This information goes on your blood pressure recording sheet. This will help your healthcare provider monitor how well the medicine changes are working.  · Ask your healthcare provider what numbers should prompt you to call him or her. Also ask what numbers should prompt you to get help right away.  Date Last Reviewed: 11/1/2016  © 8446-2912 [employer]. [address] [place] [address], [place] [address]. All rights reserved. This information is not intended as a substitute for professional medical care. Always follow your healthcare professional's instructions.

## 2018-09-10 NOTE — PROGRESS NOTES
Judie Munoz  09/10/2018  [id number]    Ritika Tong MD  Patient Care Team:  Ritika Tong MD as PCP - General (Family Medicine)  Has the patient seen any provider outside of the [facility] network since the last visit? (no). If yes, HIPPA forms completed and records requested.        Visit Type:a scheduled routine follow-up visit    Chief Complaint:  Chief Complaint   Patient presents with    Sore Throat     past three days. painfull and its hard for her to breath a       History of Present Illness:  Patient here for 3 days of sore throat. Reports hard to take a deep breath.  Her daugther has strep throat and was dx today.  Denies fever. She is achy.  Not taking anything.    She was enrolled in [employer] [facility].  Needs to re engage.             Review of Systems   Constitutional: Negative.  Negative for chills and fever.   HENT: Positive for sore throat. Negative for congestion and sinus pain.    Eyes: Negative for blurred vision and double vision.   Respiratory: Negative for cough, sputum production, shortness of breath and wheezing.    Cardiovascular: Negative for chest pain, palpitations and leg swelling.   Gastrointestinal: Negative for abdominal pain, constipation, diarrhea, heartburn, nausea and vomiting.   Genitourinary: Negative.    Musculoskeletal: Negative.    Skin: Negative.  Negative for rash.   Neurological: Negative.    Endo/Heme/Allergies: Negative.  Negative for polydipsia. Does not bruise/bleed easily.   Psychiatric/Behavioral: Negative for depression and substance abuse.       History:  Past Medical History:   Diagnosis Date    Hypertension      Past Surgical History:   Procedure Laterality Date    D&C x 3       miscarriages     Family History   Problem Relation Age of Onset    Heart disease Mother     Hypertension Mother     Diabetes Father     Cancer Maternal Aunt     Diabetes Paternal Aunt     Diabetes Maternal Grandmother     Heart disease Maternal Grandmother     Kidney disease  Maternal Grandmother      Social History     Socioeconomic History    Marital status: Single     Spouse name: Not on file    Number of children: 2    Years of education: Not on file    Highest education level: Not on file   Social Needs    Financial resource strain: Not on file    Food insecurity - worry: Not on file    Food insecurity - inability: Not on file    Transportation needs - medical: Not on file    Transportation needs - non-medical: Not on file   Occupational History     Employer: [employer]   Tobacco Use    Smoking status: Never Smoker    Smokeless tobacco: Never Used   Substance and Sexual Activity    Alcohol use: No     Alcohol/week: 0.0 oz    Drug use: No    Sexual activity: Not Currently     Partners: Male   Other Topics Concern    Not on file   Social History Narrative    Not on file     Patient Active Problem List   Diagnosis    Hypertension    Migraines     Review of patient's allergies indicates:  No Known Allergies    The following were reviewed at this visit: active problem list, medication list, allergies, family history, social history, and health maintenance.    Medications:  Current Outpatient Medications on File Prior to Visit   Medication Sig Dispense Refill    amLODIPine (NORVASC) 5 MG tablet Take 1 tablet (5 mg total) by mouth once daily. 30 tablet 3    butalbital-acetaminophen-caffeine -40 mg (FIORICET, ESGIC) -40 mg per tablet Take 1 tablet by mouth every 6 (six) hours as needed. 30 tablet 1    hydroCHLOROthiazide (HYDRODIURIL) 25 MG tablet Take 1 tablet (25 mg total) by mouth once daily. 90 tablet 1    levonorgestrel (MIRENA IU) by Intrauterine route.      ZOLMitriptan (ZOMIG-ZMT) 5 MG disintegrating tablet Take 1 tablet (5 mg total) by mouth as needed. 6 tablet 2     No current facility-administered medications on file prior to visit.        Medications have been reviewed and reconciled with patient at this visit.  Barriers to  medications present (no)    Adverse reactions to current medications (no)    Over the counter medications reviewed (Yes ), and if needed added to active Medication list at this visit.     Exam:  Wt Readings from Last 3 Encounters:   09/10/18 102.1 kg (225 lb 1.4 oz)   06/14/18 103.8 kg (228 lb 13.4 oz)   04/30/18 104.4 kg (230 lb 2.6 oz)     Temp Readings from Last 3 Encounters:   06/14/18 98.3 °F (36.8 °C) (Tympanic)   04/30/18 97.9 °F (36.6 °C) (Tympanic)   11/01/17 98.3 °F (36.8 °C) (Tympanic)     BP Readings from Last 3 Encounters:   06/14/18 132/86   04/30/18 (!) 140/98   11/01/17 (!) 130/96     Pulse Readings from Last 3 Encounters:   06/14/18 101   04/30/18 84   11/01/17 87     Body mass index is 35.25 kg/m².      Physical Exam   Constitutional: She is oriented to person, place, and time. She appears well-developed and well-nourished. No distress.   HENT:   Head: Normocephalic and atraumatic.   Right Ear: External ear normal.   Left Ear: External ear normal.   Nose: Nose normal.   Mouth/Throat: Posterior oropharyngeal erythema present. No oropharyngeal exudate. Tonsils are 1+ on the right. Tonsils are 1+ on the left.   Eyes: Conjunctivae and EOM are normal. Pupils are equal, round, and reactive to light. Right eye exhibits no discharge. Left eye exhibits no discharge.   Neck: Normal range of motion. Neck supple. No thyromegaly present.   Cardiovascular: Normal rate, regular rhythm, normal heart sounds and intact distal pulses.   No murmur heard.  Pulmonary/Chest: Effort normal and breath sounds normal. No respiratory distress. She has no wheezes.   Abdominal: Soft. Bowel sounds are normal. She exhibits no distension and no mass. There is no tenderness.   Musculoskeletal: Normal range of motion. She exhibits no edema.   Lymphadenopathy:     She has cervical adenopathy.        Right cervical: Superficial cervical adenopathy present.        Left cervical: Superficial cervical adenopathy present.   Neurological:  She is alert and oriented to person, place, and time. No cranial nerve deficit.   Skin: Capillary refill takes less than 2 seconds. She is not diaphoretic.   Psychiatric: She has a normal mood and affect. Her behavior is normal. Judgment and thought content normal.   Nursing note and vitals reviewed.      Laboratory Reviewed ({N/A)  Lab Results   Component Value Date    WBC 9.38 04/24/2017    HGB 13.1 04/24/2017    HCT 40.5 04/24/2017     04/24/2017    CHOL 164 04/24/2017    TRIG 68 04/24/2017    HDL 48 04/24/2017    ALT 12 04/30/2018    AST 13 04/30/2018     04/30/2018    K 3.7 04/30/2018     04/30/2018    CREATININE 0.7 04/30/2018    BUN 11 04/30/2018    CO2 27 04/30/2018    TSH 1.099 04/24/2017    HGBA1C 5.8 04/24/2017       Assessment:  The primary encounter diagnosis was Sore throat. A diagnosis of Essential hypertension was also pertinent to this visit.     Plan       -Patient's lab results were reviewed and discussed with patient  -Treatment options and alternatives were discussed with the patient. Patient expressed understanding. Patient was given the opportunity to ask questions and be an active participant in their medical care. Patient had no further questions or concerns at this time.     Care Plan/Goals: Reviewed  (N/A)  Goals        Patient Stated     Blood Pressure < 130/80 (pt-stated)        Other     Exercise at least 150 minutes per week.      Keep up the great work with getting 30 min of exercise most days per week!        Maintain a low sodium diet      Aim for less than 2000 mg sodium per day and look for items with less than 140 mg sodium per serving       Take at least one BP reading per week at various times of the day      Weight < 0 kg           Follow up: No Follow-up on file.    After visit summary was printed and given to patient upon discharge today.  Patient goals and care plan are included in After Visit Summary.

## 2018-09-13 ENCOUNTER — PATIENT MESSAGE (OUTPATIENT)
Dept: INTERNAL MEDICINE | Facility: CLINIC | Age: 38
End: 2018-09-13

## 2018-09-13 ENCOUNTER — TELEPHONE (OUTPATIENT)
Dept: INTERNAL MEDICINE | Facility: CLINIC | Age: 38
End: 2018-09-13

## 2018-09-13 RX ORDER — METHYLPREDNISOLONE 4 MG/1
TABLET ORAL
Qty: 1 PACKAGE | Refills: 0 | Status: SHIPPED | OUTPATIENT
Start: 2018-09-13 | End: 2018-10-04

## 2018-09-13 RX ORDER — AZITHROMYCIN 250 MG/1
TABLET, FILM COATED ORAL
Qty: 6 TABLET | Refills: 0 | Status: SHIPPED | OUTPATIENT
Start: 2018-09-13 | End: 2018-09-18

## 2018-09-13 NOTE — TELEPHONE ENCOUNTER
----- Message from Maureen Zabala sent at 9/13/2018  9:41 AM CDT -----  Contact: pt  She's returning a call, please advise [phone]

## 2018-09-13 NOTE — TELEPHONE ENCOUNTER
Patient states that she is feeling worse. She said seems like everyday it is getting worse. She said that she has been coughing a lot as well and the coughing is worsens at night. She was trying to see if you could send something else in for her and she said she would be willing to come back in if she needs to.

## 2018-10-23 DIAGNOSIS — I10 ESSENTIAL HYPERTENSION: ICD-10-CM

## 2018-10-25 RX ORDER — AMLODIPINE BESYLATE 5 MG/1
5 TABLET ORAL DAILY
Qty: 30 TABLET | Refills: 3 | Status: SHIPPED | OUTPATIENT
Start: 2018-10-25 | End: 2019-05-01 | Stop reason: SDUPTHER

## 2018-11-24 DIAGNOSIS — I10 HYPERTENSION GOAL BP (BLOOD PRESSURE) < 140/90: ICD-10-CM

## 2018-11-25 RX ORDER — HYDROCHLOROTHIAZIDE 25 MG/1
TABLET ORAL
Qty: 90 TABLET | Refills: 0 | Status: SHIPPED | OUTPATIENT
Start: 2018-11-25 | End: 2019-05-01 | Stop reason: SDUPTHER

## 2018-12-31 ENCOUNTER — OFFICE VISIT (OUTPATIENT)
Dept: INTERNAL MEDICINE | Facility: CLINIC | Age: 38
End: 2018-12-31
Payer: COMMERCIAL

## 2018-12-31 ENCOUNTER — CLINICAL SUPPORT (OUTPATIENT)
Dept: CARDIOLOGY | Facility: CLINIC | Age: 38
End: 2018-12-31
Payer: COMMERCIAL

## 2018-12-31 ENCOUNTER — LAB VISIT (OUTPATIENT)
Dept: LAB | Facility: HOSPITAL | Age: 38
End: 2018-12-31
Attending: FAMILY MEDICINE
Payer: COMMERCIAL

## 2018-12-31 VITALS
OXYGEN SATURATION: 98 % | SYSTOLIC BLOOD PRESSURE: 128 MMHG | WEIGHT: 225.5 LBS | DIASTOLIC BLOOD PRESSURE: 86 MMHG | HEIGHT: 67 IN | TEMPERATURE: 98 F | BODY MASS INDEX: 35.39 KG/M2 | HEART RATE: 94 BPM

## 2018-12-31 DIAGNOSIS — R07.9 CHEST PAIN, UNSPECIFIED TYPE: ICD-10-CM

## 2018-12-31 DIAGNOSIS — I10 ESSENTIAL HYPERTENSION: ICD-10-CM

## 2018-12-31 DIAGNOSIS — R07.9 CHEST PAIN, UNSPECIFIED TYPE: Primary | ICD-10-CM

## 2018-12-31 LAB
ANION GAP SERPL CALC-SCNC: 10 MMOL/L
BUN SERPL-MCNC: 11 MG/DL
CALCIUM SERPL-MCNC: 9.7 MG/DL
CHLORIDE SERPL-SCNC: 98 MMOL/L
CK MB SERPL-MCNC: 0.9 NG/ML
CK MB SERPL-RTO: 0.9 %
CK SERPL-CCNC: 101 U/L
CO2 SERPL-SCNC: 30 MMOL/L
CREAT SERPL-MCNC: 0.8 MG/DL
EST. GFR  (AFRICAN AMERICAN): >60 ML/MIN/1.73 M^2
EST. GFR  (NON AFRICAN AMERICAN): >60 ML/MIN/1.73 M^2
GLUCOSE SERPL-MCNC: 86 MG/DL
POTASSIUM SERPL-SCNC: 3.5 MMOL/L
SODIUM SERPL-SCNC: 138 MMOL/L
TROPONIN I SERPL DL<=0.01 NG/ML-MCNC: <0.006 NG/ML

## 2018-12-31 PROCEDURE — 80048 BASIC METABOLIC PNL TOTAL CA: CPT

## 2018-12-31 PROCEDURE — 3074F SYST BP LT 130 MM HG: CPT | Mod: CPTII,S$GLB,, | Performed by: FAMILY MEDICINE

## 2018-12-31 PROCEDURE — 3079F DIAST BP 80-89 MM HG: CPT | Mod: CPTII,S$GLB,, | Performed by: FAMILY MEDICINE

## 2018-12-31 PROCEDURE — 93005 ELECTROCARDIOGRAM TRACING: CPT | Mod: S$GLB,,, | Performed by: FAMILY MEDICINE

## 2018-12-31 PROCEDURE — 93010 ELECTROCARDIOGRAM REPORT: CPT | Mod: S$GLB,,, | Performed by: INTERNAL MEDICINE

## 2018-12-31 PROCEDURE — 93005 EKG 12-LEAD: ICD-10-PCS | Mod: S$GLB,,, | Performed by: FAMILY MEDICINE

## 2018-12-31 PROCEDURE — 93010 EKG 12-LEAD: ICD-10-PCS | Mod: S$GLB,,, | Performed by: INTERNAL MEDICINE

## 2018-12-31 PROCEDURE — 99999 PR PBB SHADOW E&M-EST. PATIENT-LVL III: CPT | Mod: PBBFAC,,, | Performed by: FAMILY MEDICINE

## 2018-12-31 PROCEDURE — 36415 COLL VENOUS BLD VENIPUNCTURE: CPT

## 2018-12-31 PROCEDURE — 3008F BODY MASS INDEX DOCD: CPT | Mod: CPTII,S$GLB,, | Performed by: FAMILY MEDICINE

## 2018-12-31 PROCEDURE — 84484 ASSAY OF TROPONIN QUANT: CPT

## 2018-12-31 PROCEDURE — 99214 OFFICE O/P EST MOD 30 MIN: CPT | Mod: S$GLB,,, | Performed by: FAMILY MEDICINE

## 2018-12-31 PROCEDURE — 82553 CREATINE MB FRACTION: CPT
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
## 2019-05-01 DIAGNOSIS — I10 ESSENTIAL HYPERTENSION: ICD-10-CM

## 2019-05-01 DIAGNOSIS — I10 HYPERTENSION GOAL BP (BLOOD PRESSURE) < 140/90: ICD-10-CM

## 2019-05-01 RX ORDER — HYDROCHLOROTHIAZIDE 25 MG/1
TABLET ORAL
Qty: 90 TABLET | Refills: 0 | Status: SHIPPED | OUTPATIENT
Start: 2019-05-01 | End: 2019-05-01 | Stop reason: SDUPTHER

## 2019-05-01 RX ORDER — ZOLMITRIPTAN 5 MG/1
5 TABLET, ORALLY DISINTEGRATING ORAL
Qty: 6 TABLET | Refills: 2 | Status: SHIPPED | OUTPATIENT
Start: 2019-05-01 | End: 2021-03-13 | Stop reason: SDUPTHER

## 2019-05-01 RX ORDER — AMLODIPINE BESYLATE 5 MG/1
5 TABLET ORAL DAILY
Qty: 30 TABLET | Refills: 3 | Status: SHIPPED | OUTPATIENT
Start: 2019-05-01 | End: 2019-12-29

## 2019-05-01 RX ORDER — BUTALBITAL, ACETAMINOPHEN AND CAFFEINE 50; 325; 40 MG/1; MG/1; MG/1
1 TABLET ORAL EVERY 6 HOURS PRN
Qty: 30 TABLET | Refills: 1 | OUTPATIENT
Start: 2019-05-01

## 2019-05-01 RX ORDER — ZOLMITRIPTAN 5 MG/1
5 TABLET, ORALLY DISINTEGRATING ORAL
Qty: 6 TABLET | Refills: 2 | OUTPATIENT
Start: 2019-05-01

## 2019-05-01 RX ORDER — BUTALBITAL, ACETAMINOPHEN AND CAFFEINE 50; 325; 40 MG/1; MG/1; MG/1
1 TABLET ORAL EVERY 8 HOURS PRN
Qty: 30 TABLET | Refills: 1 | Status: SHIPPED | OUTPATIENT
Start: 2019-05-01 | End: 2020-02-03 | Stop reason: SDUPTHER

## 2019-05-01 RX ORDER — HYDROCHLOROTHIAZIDE 25 MG/1
25 TABLET ORAL DAILY
Qty: 90 TABLET | Refills: 0 | Status: SHIPPED | OUTPATIENT
Start: 2019-05-01 | End: 2020-02-03

## 2019-07-28 DIAGNOSIS — I10 HYPERTENSION GOAL BP (BLOOD PRESSURE) < 140/90: ICD-10-CM

## 2019-07-28 RX ORDER — HYDROCHLOROTHIAZIDE 25 MG/1
TABLET ORAL
Qty: 90 TABLET | Refills: 0 | Status: SHIPPED | OUTPATIENT
Start: 2019-07-28 | End: 2020-02-03 | Stop reason: SDUPTHER

## 2019-12-12 ENCOUNTER — TELEPHONE (OUTPATIENT)
Dept: ADMINISTRATIVE | Facility: HOSPITAL | Age: 39
End: 2019-12-12

## 2019-12-28 DIAGNOSIS — I10 ESSENTIAL HYPERTENSION: ICD-10-CM

## 2019-12-29 RX ORDER — AMLODIPINE BESYLATE 5 MG/1
TABLET ORAL
Qty: 14 TABLET | Refills: 0 | Status: SHIPPED | OUTPATIENT
Start: 2019-12-29 | End: 2020-02-03 | Stop reason: SDUPTHER

## 2020-01-08 ENCOUNTER — PATIENT OUTREACH (OUTPATIENT)
Dept: ADMINISTRATIVE | Facility: HOSPITAL | Age: 40
End: 2020-01-08

## 2020-02-02 ENCOUNTER — PATIENT MESSAGE (OUTPATIENT)
Dept: ADMINISTRATIVE | Facility: OTHER | Age: 40
End: 2020-02-02

## 2020-02-03 ENCOUNTER — LAB VISIT (OUTPATIENT)
Dept: LAB | Facility: HOSPITAL | Age: 40
End: 2020-02-03
Attending: FAMILY MEDICINE
Payer: COMMERCIAL

## 2020-02-03 ENCOUNTER — OFFICE VISIT (OUTPATIENT)
Dept: INTERNAL MEDICINE | Facility: CLINIC | Age: 40
End: 2020-02-03
Payer: COMMERCIAL

## 2020-02-03 VITALS
DIASTOLIC BLOOD PRESSURE: 96 MMHG | HEIGHT: 67 IN | WEIGHT: 223.75 LBS | BODY MASS INDEX: 35.12 KG/M2 | TEMPERATURE: 100 F | OXYGEN SATURATION: 99 % | SYSTOLIC BLOOD PRESSURE: 138 MMHG | HEART RATE: 78 BPM

## 2020-02-03 DIAGNOSIS — G43.C0 PERIODIC HEADACHE SYNDROME, NOT INTRACTABLE: ICD-10-CM

## 2020-02-03 DIAGNOSIS — I10 HYPERTENSION GOAL BP (BLOOD PRESSURE) < 140/90: ICD-10-CM

## 2020-02-03 DIAGNOSIS — F41.9 ANXIETY: ICD-10-CM

## 2020-02-03 DIAGNOSIS — I10 ESSENTIAL HYPERTENSION: ICD-10-CM

## 2020-02-03 DIAGNOSIS — Z00.00 ANNUAL PHYSICAL EXAM: Primary | ICD-10-CM

## 2020-02-03 DIAGNOSIS — R07.9 CHEST PAIN, UNSPECIFIED TYPE: ICD-10-CM

## 2020-02-03 DIAGNOSIS — R59.0 LAD (LYMPHADENOPATHY) OF RIGHT CERVICAL REGION: ICD-10-CM

## 2020-02-03 LAB
ALBUMIN SERPL BCP-MCNC: 3.7 G/DL (ref 3.5–5.2)
ALP SERPL-CCNC: 75 U/L (ref 55–135)
ALT SERPL W/O P-5'-P-CCNC: 15 U/L (ref 10–44)
ANION GAP SERPL CALC-SCNC: 11 MMOL/L (ref 8–16)
AST SERPL-CCNC: 19 U/L (ref 10–40)
BILIRUB SERPL-MCNC: 0.5 MG/DL (ref 0.1–1)
BUN SERPL-MCNC: 10 MG/DL (ref 6–20)
CALCIUM SERPL-MCNC: 8.8 MG/DL (ref 8.7–10.5)
CHLORIDE SERPL-SCNC: 102 MMOL/L (ref 95–110)
CHOLEST SERPL-MCNC: 164 MG/DL (ref 120–199)
CHOLEST/HDLC SERPL: 3.6 {RATIO} (ref 2–5)
CO2 SERPL-SCNC: 29 MMOL/L (ref 23–29)
CREAT SERPL-MCNC: 0.8 MG/DL (ref 0.5–1.4)
EST. GFR  (AFRICAN AMERICAN): >60 ML/MIN/1.73 M^2
EST. GFR  (NON AFRICAN AMERICAN): >60 ML/MIN/1.73 M^2
GLUCOSE SERPL-MCNC: 79 MG/DL (ref 70–110)
HDLC SERPL-MCNC: 45 MG/DL (ref 40–75)
HDLC SERPL: 27.4 % (ref 20–50)
LDLC SERPL CALC-MCNC: 107.6 MG/DL (ref 63–159)
NONHDLC SERPL-MCNC: 119 MG/DL
POTASSIUM SERPL-SCNC: 3.3 MMOL/L (ref 3.5–5.1)
PROT SERPL-MCNC: 7.8 G/DL (ref 6–8.4)
SODIUM SERPL-SCNC: 142 MMOL/L (ref 136–145)
TRIGL SERPL-MCNC: 57 MG/DL (ref 30–150)

## 2020-02-03 PROCEDURE — 3008F BODY MASS INDEX DOCD: CPT | Mod: CPTII,S$GLB,, | Performed by: FAMILY MEDICINE

## 2020-02-03 PROCEDURE — 99214 OFFICE O/P EST MOD 30 MIN: CPT | Mod: S$GLB,,, | Performed by: FAMILY MEDICINE

## 2020-02-03 PROCEDURE — 3075F SYST BP GE 130 - 139MM HG: CPT | Mod: CPTII,S$GLB,, | Performed by: FAMILY MEDICINE

## 2020-02-03 PROCEDURE — 3008F PR BODY MASS INDEX (BMI) DOCUMENTED: ICD-10-PCS | Mod: CPTII,S$GLB,, | Performed by: FAMILY MEDICINE

## 2020-02-03 PROCEDURE — 36415 COLL VENOUS BLD VENIPUNCTURE: CPT

## 2020-02-03 PROCEDURE — 99999 PR PBB SHADOW E&M-EST. PATIENT-LVL IV: ICD-10-PCS | Mod: PBBFAC,,, | Performed by: FAMILY MEDICINE

## 2020-02-03 PROCEDURE — 3080F PR MOST RECENT DIASTOLIC BLOOD PRESSURE >= 90 MM HG: ICD-10-PCS | Mod: CPTII,S$GLB,, | Performed by: FAMILY MEDICINE

## 2020-02-03 PROCEDURE — 3075F PR MOST RECENT SYSTOLIC BLOOD PRESS GE 130-139MM HG: ICD-10-PCS | Mod: CPTII,S$GLB,, | Performed by: FAMILY MEDICINE

## 2020-02-03 PROCEDURE — 99214 PR OFFICE/OUTPT VISIT, EST, LEVL IV, 30-39 MIN: ICD-10-PCS | Mod: S$GLB,,, | Performed by: FAMILY MEDICINE

## 2020-02-03 PROCEDURE — 80061 LIPID PANEL: CPT

## 2020-02-03 PROCEDURE — 99999 PR PBB SHADOW E&M-EST. PATIENT-LVL IV: CPT | Mod: PBBFAC,,, | Performed by: FAMILY MEDICINE

## 2020-02-03 PROCEDURE — 80053 COMPREHEN METABOLIC PANEL: CPT

## 2020-02-03 PROCEDURE — 3080F DIAST BP >= 90 MM HG: CPT | Mod: CPTII,S$GLB,, | Performed by: FAMILY MEDICINE

## 2020-02-03 RX ORDER — AMLODIPINE BESYLATE 5 MG/1
5 TABLET ORAL DAILY
Qty: 90 TABLET | Refills: 0 | Status: SHIPPED | OUTPATIENT
Start: 2020-02-03 | End: 2020-03-10 | Stop reason: SDUPTHER

## 2020-02-03 RX ORDER — HYDROCHLOROTHIAZIDE 25 MG/1
25 TABLET ORAL DAILY
Qty: 90 TABLET | Refills: 0 | Status: SHIPPED | OUTPATIENT
Start: 2020-02-03 | End: 2020-03-10 | Stop reason: SDUPTHER

## 2020-02-03 RX ORDER — TOPIRAMATE 25 MG/1
25 TABLET ORAL 2 TIMES DAILY
Qty: 60 TABLET | Refills: 11 | Status: SHIPPED | OUTPATIENT
Start: 2020-02-03 | End: 2023-03-28

## 2020-02-03 RX ORDER — BUTALBITAL, ACETAMINOPHEN AND CAFFEINE 50; 325; 40 MG/1; MG/1; MG/1
1 TABLET ORAL EVERY 8 HOURS PRN
Qty: 30 TABLET | Refills: 1 | Status: SHIPPED | OUTPATIENT
Start: 2020-02-03 | End: 2021-03-07 | Stop reason: SDUPTHER
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
## 2020-02-04 ENCOUNTER — HOSPITAL ENCOUNTER (OUTPATIENT)
Dept: RADIOLOGY | Facility: HOSPITAL | Age: 40
Discharge: HOME | End: 2020-02-04
Attending: FAMILY MEDICINE
Payer: COMMERCIAL

## 2020-02-04 DIAGNOSIS — R59.0 LAD (LYMPHADENOPATHY) OF RIGHT CERVICAL REGION: ICD-10-CM

## 2020-02-04 PROCEDURE — 76536 US EXAM OF HEAD AND NECK: CPT | Mod: TC

## 2020-02-05 ENCOUNTER — PATIENT MESSAGE (OUTPATIENT)
Dept: INTERNAL MEDICINE | Facility: CLINIC | Age: 40
End: 2020-02-05

## 2020-02-05 ENCOUNTER — TELEPHONE (OUTPATIENT)
Dept: INTERNAL MEDICINE | Facility: CLINIC | Age: 40
End: 2020-02-05

## 2020-02-05 DIAGNOSIS — E04.1 THYROID NODULE: Primary | ICD-10-CM

## 2020-02-05 NOTE — TELEPHONE ENCOUNTER
----- Message from Indy Andrade sent at 2/5/2020  9:35 AM CST -----  Contact: patient  Would like to consult with nurse regarding thyroid ultrasound results. Please call back at [phone]

## 2020-02-05 NOTE — PROGRESS NOTES
Notify patient  US shows bilateral thyroid nodules.    Her lymph nodes in her neck appear normal.    Due to the size of the thyroid nodules, I would like her to see Dr. Medrano to discuss needle biopsy of this.  I have placed a referral to ENT>

## 2020-02-05 NOTE — PROGRESS NOTES
Dr Fleming stopped doing them. I do not think ENT does them besides [facility].    We can go outside of [facility] is she would like.

## 2020-02-17 ENCOUNTER — PATIENT OUTREACH (OUTPATIENT)
Dept: ADMINISTRATIVE | Facility: HOSPITAL | Age: 40
End: 2020-02-17

## 2020-02-21 ENCOUNTER — PROCEDURE VISIT (OUTPATIENT)
Dept: OTOLARYNGOLOGY | Facility: CLINIC | Age: 40
End: 2020-02-21
Payer: COMMERCIAL

## 2020-02-21 VITALS
HEART RATE: 81 BPM | SYSTOLIC BLOOD PRESSURE: 130 MMHG | TEMPERATURE: 97 F | DIASTOLIC BLOOD PRESSURE: 90 MMHG | BODY MASS INDEX: 35.15 KG/M2 | WEIGHT: 224.44 LBS

## 2020-02-21 DIAGNOSIS — E04.1 THYROID NODULE: ICD-10-CM

## 2020-02-21 PROCEDURE — 10005 FNA BX W/US GDN 1ST LES: CPT | Mod: S$GLB,,, | Performed by: OTOLARYNGOLOGY

## 2020-02-21 PROCEDURE — 10005 PR FINE NEEDLE ASP BIOPSY, W/US GUIDANCE, 1ST LESION: ICD-10-PCS | Mod: S$GLB,,, | Performed by: OTOLARYNGOLOGY

## 2020-02-21 PROCEDURE — 88173 CYTOPATH EVAL FNA REPORT: CPT | Mod: 26,,, | Performed by: PATHOLOGY

## 2020-02-21 PROCEDURE — 88173 PR  INTERPRETATION OF FNA SMEAR: ICD-10-PCS | Mod: 26,,, | Performed by: PATHOLOGY

## 2020-02-21 PROCEDURE — 88173 CYTOPATH EVAL FNA REPORT: CPT | Performed by: PATHOLOGY

## 2020-02-21 NOTE — PROCEDURES
Procedures     Procedure: Ultrasound-guided FNA of left thyroid nodule    Clinical History:    thyroid nodule(s)    Technique/findings: After the risks, benefits and options of the planned procedure were explained to the patient in full detail, the patient expressed complete understanding and gave signed informed consent.  The skin overlying this area was prepped and draped in the usual sterile fashion. A timeout was performed. 1% lidocaine was used as a local anesthetic.  The mass/nodule was visualized with ultrasound and each needle was visualized to enter the intended biopsy site. Each nodule had 3 passes with 25 gauge needles and 1 22 gauge needle.  These were placed separately onto slides.    Locations biopsied:  1. Left lower lobe nodule      Post procedure ultrasound fail to demonstrate evidence for a post procedure hemorrhage or other complication. A sterile dressing was applied.  The patient tolerated the procedure well without complication comment departing the ultrasound suite in unchanged condition.   Impression       1. Successful ultrasound-guided FNA.      Ross Merdano    02/21/2020   11:22 AM

## 2020-02-24 LAB — FINAL PATHOLOGIC DIAGNOSIS: NORMAL

## 2020-02-27 ENCOUNTER — PATIENT MESSAGE (OUTPATIENT)
Dept: OTOLARYNGOLOGY | Facility: CLINIC | Age: 40
End: 2020-02-27

## 2020-02-27 DIAGNOSIS — E04.1 THYROID NODULE: Primary | ICD-10-CM

## 2020-03-02 ENCOUNTER — OFFICE VISIT (OUTPATIENT)
Dept: INTERNAL MEDICINE | Facility: CLINIC | Age: 40
End: 2020-03-02
Payer: COMMERCIAL

## 2020-03-02 VITALS
TEMPERATURE: 100 F | BODY MASS INDEX: 35.22 KG/M2 | HEART RATE: 101 BPM | SYSTOLIC BLOOD PRESSURE: 122 MMHG | WEIGHT: 224.44 LBS | HEIGHT: 67 IN | DIASTOLIC BLOOD PRESSURE: 84 MMHG | OXYGEN SATURATION: 98 %

## 2020-03-02 DIAGNOSIS — I10 ESSENTIAL HYPERTENSION: Primary | ICD-10-CM

## 2020-03-02 DIAGNOSIS — E04.1 THYROID NODULE: ICD-10-CM

## 2020-03-02 PROCEDURE — 99999 PR PBB SHADOW E&M-EST. PATIENT-LVL III: ICD-10-PCS | Mod: PBBFAC,,, | Performed by: FAMILY MEDICINE

## 2020-03-02 PROCEDURE — 3074F SYST BP LT 130 MM HG: CPT | Mod: CPTII,S$GLB,, | Performed by: FAMILY MEDICINE

## 2020-03-02 PROCEDURE — 3074F PR MOST RECENT SYSTOLIC BLOOD PRESSURE < 130 MM HG: ICD-10-PCS | Mod: CPTII,S$GLB,, | Performed by: FAMILY MEDICINE

## 2020-03-02 PROCEDURE — 99214 OFFICE O/P EST MOD 30 MIN: CPT | Mod: S$GLB,,, | Performed by: FAMILY MEDICINE

## 2020-03-02 PROCEDURE — 99214 PR OFFICE/OUTPT VISIT, EST, LEVL IV, 30-39 MIN: ICD-10-PCS | Mod: S$GLB,,, | Performed by: FAMILY MEDICINE

## 2020-03-02 PROCEDURE — 3079F PR MOST RECENT DIASTOLIC BLOOD PRESSURE 80-89 MM HG: ICD-10-PCS | Mod: CPTII,S$GLB,, | Performed by: FAMILY MEDICINE

## 2020-03-02 PROCEDURE — 3008F BODY MASS INDEX DOCD: CPT | Mod: CPTII,S$GLB,, | Performed by: FAMILY MEDICINE

## 2020-03-02 PROCEDURE — 99999 PR PBB SHADOW E&M-EST. PATIENT-LVL III: CPT | Mod: PBBFAC,,, | Performed by: FAMILY MEDICINE

## 2020-03-02 PROCEDURE — 3008F PR BODY MASS INDEX (BMI) DOCUMENTED: ICD-10-PCS | Mod: CPTII,S$GLB,, | Performed by: FAMILY MEDICINE

## 2020-03-02 PROCEDURE — 3079F DIAST BP 80-89 MM HG: CPT | Mod: CPTII,S$GLB,, | Performed by: FAMILY MEDICINE

## 2020-03-02 NOTE — PROGRESS NOTES
"Judie Munoz  03/02/2020  [id number]    Ritika Tong MD  Patient Care Team:  Ritika Tong MD as PCP - General (Family Medicine)  Lilibeth Callejas LPN as Care Coordinator (Internal Medicine)  Has the patient seen any provider outside of the [facility] network since the last visit? (no). If yes, HIPPA forms completed and records requested.        Visit Type:a scheduled routine follow-up visit    Chief Complaint:  Chief Complaint   Patient presents with    Follow-up     4wk f/u       History of Present Illness:  39 year old here for interval follow up.    History of HTN.  Norvasc and HCTz as treatment.     Recent thyroid biopsy after finding thyroid nodules 2.5 cm in size. She reports she still feels that her right side of neck does "throb". That has not changed.     Migraines were worsening. She in on Fioricet.  Increase stressors.   Reports associated neck pain.  Started Topamax last visit.  She reports that it makes her feel nauseated at night. She reports that she has been on it before. She is unware of the previous dose.  She reports she feels that she has had some relief in her HA.  When she took the Fioricet.    She has had a cold since Thrusday.  She is taking Dayquil and Nyquil. She has tried Claritin and Zyrtec. She was having hoarsnses.  Denies fever.      Answers for HPI/ROS submitted by the patient on 2/28/2020   activity change: No  unexpected weight change: No  rhinorrhea: No  trouble swallowing: Yes  visual disturbance: No  chest tightness: Yes  polyuria: No  difficulty urinating: No  menstrual problem: No  joint swelling: No  arthralgias: No  confusion: No  dysphoric mood: No          History:  Past Medical History:   Diagnosis Date    Hypertension      Past Surgical History:   Procedure Laterality Date    D&C x 3       miscarriages     Family History   Problem Relation Age of Onset    Heart disease Mother     Hypertension Mother     Diabetes Father     Cancer Maternal Aunt     " Diabetes Paternal Aunt     Diabetes Maternal Grandmother     Heart disease Maternal Grandmother     Kidney disease Maternal Grandmother      Social History     Socioeconomic History    Marital status: Single     Spouse name: Not on file    Number of children: 2    Years of education: Not on file    Highest education level: Not on file   Occupational History     Employer: [employer]   Social Needs    Financial resource strain: Not hard at all    Food insecurity:     Worry: Never true     Inability: Never true    Transportation needs:     Medical: No     Non-medical: No   Tobacco Use    Smoking status: Never Smoker    Smokeless tobacco: Never Used   Substance and Sexual Activity    Alcohol use: No     Alcohol/week: 0.0 standard drinks     Frequency: Monthly or less     Drinks per session: 1 or 2     Binge frequency: Never    Drug use: No    Sexual activity: Not Currently     Partners: Male   Lifestyle    Physical activity:     Days per week: 2 days     Minutes per session: 30 min    Stress: Very much   Relationships    Social connections:     Talks on phone: Three times a week     Gets together: Once a week     Attends Yarsani service: Not on file     Active member of club or organization: No     Attends meetings of clubs or organizations: Never     Relationship status: Never    Other Topics Concern    Not on file   Social History Narrative    Not on file     Patient Active Problem List   Diagnosis    Hypertension    Migraines    Thyroid nodule     Review of patient's allergies indicates:  No Known Allergies    The following were reviewed at this visit: active problem list, medication list, allergies, family history, social history, and health maintenance.    Medications:  Current Outpatient Medications on File Prior to Visit   Medication Sig Dispense Refill    amLODIPine (NORVASC) 5 MG tablet Take 1 tablet (5 mg total) by mouth once daily. 90 tablet 0     butalbital-acetaminophen-caffeine -40 mg (FIORICET, ESGIC) -40 mg per tablet Take 1 tablet by mouth every 8 (eight) hours as needed. 30 tablet 1    hydroCHLOROthiazide (HYDRODIURIL) 25 MG tablet Take 1 tablet (25 mg total) by mouth once daily. 90 tablet 0    levonorgestrel (MIRENA IU) by Intrauterine route.      topiramate (TOPAMAX) 25 MG tablet Take 1 tablet (25 mg total) by mouth 2 (two) times daily. 60 tablet 11    ZOLMitriptan (ZOMIG-ZMT) 5 MG disintegrating tablet Take 1 tablet (5 mg total) by mouth as needed. 6 tablet 2     No current facility-administered medications on file prior to visit.        Medications have been reviewed and reconciled with patient at this visit.  Barriers to medications present (no)    Adverse reactions to current medications (no)    Over the counter medications reviewed (Yes ), and if needed added to active Medication list at this visit.     Exam:  Wt Readings from Last 3 Encounters:   03/02/20 101.8 kg (224 lb 6.9 oz)   02/21/20 101.8 kg (224 lb 6.9 oz)   02/03/20 101.5 kg (223 lb 12.3 oz)     Temp Readings from Last 3 Encounters:   03/02/20 100 °F (37.8 °C) (Tympanic)   02/21/20 96.5 °F (35.8 °C) (Tympanic)   02/03/20 99.8 °F (37.7 °C) (Tympanic)     BP Readings from Last 3 Encounters:   03/02/20 122/84   02/21/20 (!) 130/90   02/03/20 (!) 138/96     Pulse Readings from Last 3 Encounters:   03/02/20 101   02/21/20 81   02/03/20 78     Body mass index is 35.15 kg/m².      Review of Systems   HENT: Positive for congestion. Negative for hearing loss.    Eyes: Negative for discharge.   Respiratory: Positive for cough. Negative for wheezing.    Cardiovascular: Positive for palpitations. Negative for chest pain.   Gastrointestinal: Negative for blood in stool, constipation, diarrhea and vomiting.   Genitourinary: Negative for dysuria and hematuria.   Musculoskeletal: Positive for neck pain.   Neurological: Positive for headaches. Negative for weakness.    Endo/Heme/Allergies: Negative for polydipsia.     Physical Exam   Constitutional: She is oriented to person, place, and time. She appears well-developed and well-nourished. No distress.   HENT:   Head: Normocephalic and atraumatic.   Right Ear: External ear normal.   Left Ear: External ear normal.   Nose: Nose normal.   Mouth/Throat: Oropharynx is clear and moist. No oropharyngeal exudate.   Eyes: Pupils are equal, round, and reactive to light. Conjunctivae and EOM are normal. Right eye exhibits no discharge. Left eye exhibits no discharge.   Neck: Normal range of motion. Neck supple. No thyromegaly present.   Cardiovascular: Normal rate, regular rhythm, normal heart sounds and intact distal pulses.   No murmur heard.  Pulmonary/Chest: Effort normal and breath sounds normal. No respiratory distress. She has no wheezes.   Abdominal: Soft. Bowel sounds are normal. She exhibits no distension and no mass. There is no tenderness.   Musculoskeletal: Normal range of motion. She exhibits no edema.   Lymphadenopathy:     She has no cervical adenopathy.   Neurological: She is alert and oriented to person, place, and time. No cranial nerve deficit.   Skin: Capillary refill takes less than 2 seconds. She is not diaphoretic.   Psychiatric: She has a normal mood and affect. Her behavior is normal. Judgment and thought content normal.   Nursing note and vitals reviewed.      Laboratory Reviewed ({N/A)  Lab Results   Component Value Date    WBC 9.38 04/24/2017    HGB 13.1 04/24/2017    HCT 40.5 04/24/2017     04/24/2017    CHOL 164 02/03/2020    TRIG 57 02/03/2020    HDL 45 02/03/2020    ALT 15 02/03/2020    AST 19 02/03/2020     02/03/2020    K 3.3 (L) 02/03/2020     02/03/2020    CREATININE 0.8 02/03/2020    BUN 10 02/03/2020    CO2 29 02/03/2020    TSH 1.099 04/24/2017    HGBA1C 5.8 04/24/2017       Judie was seen today for follow-up.    Diagnoses and all orders for this visit:    Essential  hypertension   BP in goal range  Thyroid nodule   Watch annually.   Other orders  -     Hypertension Digital Medicine (HDMP) Enrollment Order  -     Hypertension Digital Medicine ([facility]): Assign Onboarding Questionnaires                Care Plan/Goals: Reviewed  (N/A)  Goals        Patient Stated     Blood Pressure < 130/80 (pt-stated)        Other     Exercise at least 150 minutes per week.      Keep up the great work with getting 30 min of exercise most days per week!        Maintain a low sodium diet      Aim for less than 2000 mg sodium per day and look for items with less than 140 mg sodium per serving       Take at least one BP reading per week at various times of the day           Follow up: No follow-ups on file.    After visit summary was printed and given to patient upon discharge today.  Patient goals and care plan are included in After Visit Summary.

## 2020-03-06 ENCOUNTER — PATIENT MESSAGE (OUTPATIENT)
Dept: INTERNAL MEDICINE | Facility: CLINIC | Age: 40
End: 2020-03-06

## 2020-03-10 DIAGNOSIS — I10 ESSENTIAL HYPERTENSION: ICD-10-CM

## 2020-03-10 DIAGNOSIS — I10 HYPERTENSION GOAL BP (BLOOD PRESSURE) < 140/90: ICD-10-CM

## 2020-03-10 RX ORDER — HYDROCHLOROTHIAZIDE 25 MG/1
25 TABLET ORAL DAILY
Qty: 90 TABLET | Refills: 0 | Status: SHIPPED | OUTPATIENT
Start: 2020-03-10 | End: 2020-07-12 | Stop reason: SDUPTHER

## 2020-03-10 RX ORDER — AMLODIPINE BESYLATE 5 MG/1
5 TABLET ORAL DAILY
Qty: 90 TABLET | Refills: 0 | Status: SHIPPED | OUTPATIENT
Start: 2020-03-10 | End: 2020-07-12 | Stop reason: SDUPTHER

## 2020-03-13 DIAGNOSIS — Z12.39 BREAST CANCER SCREENING: ICD-10-CM

## 2020-03-14 ENCOUNTER — PATIENT MESSAGE (OUTPATIENT)
Dept: ADMINISTRATIVE | Facility: OTHER | Age: 40
End: 2020-03-14

## 2020-03-25 ENCOUNTER — PATIENT MESSAGE (OUTPATIENT)
Dept: INTERNAL MEDICINE | Facility: CLINIC | Age: 40
End: 2020-03-25

## 2020-06-24 ENCOUNTER — TELEPHONE (OUTPATIENT)
Dept: INTERNAL MEDICINE | Facility: CLINIC | Age: 40
End: 2020-06-24

## 2020-06-24 ENCOUNTER — OFFICE VISIT (OUTPATIENT)
Dept: FAMILY MEDICINE | Facility: CLINIC | Age: 40
End: 2020-06-24
Attending: FAMILY MEDICINE
Payer: COMMERCIAL

## 2020-06-24 VITALS — TEMPERATURE: 98 F

## 2020-06-24 DIAGNOSIS — R51.9 HEAD ACHE: ICD-10-CM

## 2020-06-24 DIAGNOSIS — J02.9 SORE THROAT: Primary | ICD-10-CM

## 2020-06-24 DIAGNOSIS — R11.0 NAUSEA: ICD-10-CM

## 2020-06-24 LAB
CTP QC/QA: YES
MOLECULAR STREP A: NEGATIVE

## 2020-06-24 PROCEDURE — 99213 OFFICE O/P EST LOW 20 MIN: CPT | Mod: 95,,, | Performed by: FAMILY MEDICINE

## 2020-06-24 PROCEDURE — 87081 CULTURE SCREEN ONLY: CPT

## 2020-06-24 PROCEDURE — 87651 STREP A DNA AMP PROBE: CPT | Mod: QW,,, | Performed by: FAMILY MEDICINE

## 2020-06-24 PROCEDURE — 99213 PR OFFICE/OUTPT VISIT, EST, LEVL III, 20-29 MIN: ICD-10-PCS | Mod: 95,,, | Performed by: FAMILY MEDICINE

## 2020-06-24 PROCEDURE — U0003 INFECTIOUS AGENT DETECTION BY NUCLEIC ACID (DNA OR RNA); SEVERE ACUTE RESPIRATORY SYNDROME CORONAVIRUS 2 (SARS-COV-2) (CORONAVIRUS DISEASE [COVID-19]), AMPLIFIED PROBE TECHNIQUE, MAKING USE OF HIGH THROUGHPUT TECHNOLOGIES AS DESCRIBED BY CMS-2020-01-R: HCPCS

## 2020-06-24 PROCEDURE — 87651 POCT STREP A MOLECULAR: ICD-10-PCS | Mod: QW,,, | Performed by: FAMILY MEDICINE

## 2020-06-24 NOTE — TELEPHONE ENCOUNTER
Called and spoke with patient. Appointment scheduled with Dr. Escoto at 4pm for video visit. Possible sinus infection.

## 2020-06-24 NOTE — TELEPHONE ENCOUNTER
----- Message from Ritika Phillips sent at 6/24/2020  8:53 AM CDT -----  Type:  Sooner Apoointment Request    Caller is requesting a sooner appointment.  Caller declined first available appointment listed below.  Caller will not accept being placed on the waitlist and is requesting a message be sent to doctor.  Name of Caller:Judie  When is the first available appointment?07/02/2020  Symptoms:poss sinus infection  Would the patient rather a call back or a response via [facility]? call  Best Call Back Number:[phone]  Additional Information: pt wanting virtual visit and only want to see Dr. Tong

## 2020-06-24 NOTE — PROGRESS NOTES
Subjective:       Patient ID: Judie Munoz is a 39 y.o. female.    Chief Complaint: No chief complaint on file.    39 y old female being evaluated today via TM with St and  post nasal drip  Since yesterday .Feels fatigue .No sick contacts . Taking claritin and using Flonase. 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 The current episode started today. The problem has been gradually worsening. The pain is worse on the left side. There has been no fever. The pain is at a severity of 6/10. The pain is moderate. Associated symptoms include congestion, ear pain, headaches, a hoarse voice, neck pain and trouble swallowing. Pertinent negatives include no abdominal pain, coughing, diarrhea, drooling, ear discharge, plugged ear sensation, shortness of breath, stridor, swollen glands or vomiting. She has had no exposure to strep or mono. She has tried acetaminophen and gargles for the symptoms. The treatment provided no relief.     Review of Systems   HENT: Positive for congestion, ear pain, hoarse voice, sore throat and trouble swallowing. Negative for drooling and ear discharge.    Respiratory: Negative for cough, shortness of breath and stridor.    Gastrointestinal: Negative for abdominal pain, diarrhea and vomiting.   Musculoskeletal: Positive for neck pain.   Neurological: Positive for headaches.       Objective:      Physical Exam  Constitutional:       Appearance: She is obese.   HENT:      Head: Normocephalic and atraumatic.   Eyes:      Extraocular Movements: Extraocular movements intact.   Pulmonary:      Effort: Pulmonary effort is normal.   Skin:     Coloration: Skin is not jaundiced or pale.   Neurological:      Mental Status: She is oriented to person, place, and time.   Psychiatric:         Mood and Affect: Mood normal.         Behavior: Behavior normal.         Thought Content: Thought content normal.         Judgment: Judgment normal.         Assessment:     Diagnoses and all  orders for this visit:    Sore throat  -     POCT Strep A, Molecular  -     Strep A culture, throat    Head ache  -     COVID-19 Routine Screening    Nausea  -     COVID-19 Routine Screening      Plan:   We will cont to kb   Advised symptomatic treatment   WS discussed  The patient location is: Workplace   The chief complaint leading to consultation is:ST     Visit type: audiovisual     Face to Face time with patient: 10 min minutes of total time spent on the encounter, which includes face to face time and non-face to face time preparing to see the patient (eg, review of tests), Obtaining and/or reviewing separately obtained history, Documenting clinical information in the electronic or other health record, Independently interpreting results (not separately reported) and communicating results to the patient/family/caregiver, or Care coordination (not separately reported).         Each patient to whom he or she provides medical services by telemedicine is:  (1) informed of the relationship between the physician and patient and the respective role of any other health care provider with respect to management of the patient; and (2) notified that he or she may decline to receive medical services by telemedicine and may withdraw from such care at any time.

## 2020-06-27 LAB — SARS-COV-2 RNA RESP QL NAA+PROBE: NOT DETECTED

## 2020-06-28 ENCOUNTER — PATIENT MESSAGE (OUTPATIENT)
Dept: FAMILY MEDICINE | Facility: CLINIC | Age: 40
End: 2020-06-28

## 2020-06-28 LAB — BACTERIA THROAT CULT: NORMAL

## 2020-07-02 ENCOUNTER — TELEPHONE (OUTPATIENT)
Dept: FAMILY MEDICINE | Facility: CLINIC | Age: 40
End: 2020-07-02

## 2020-07-02 RX ORDER — AMOXICILLIN AND CLAVULANATE POTASSIUM 875; 125 MG/1; MG/1
1 TABLET, FILM COATED ORAL EVERY 12 HOURS
Qty: 20 TABLET | Refills: 0 | Status: SHIPPED | OUTPATIENT
Start: 2020-07-02 | End: 2020-07-22

## 2020-07-12 DIAGNOSIS — I10 ESSENTIAL HYPERTENSION: ICD-10-CM

## 2020-07-12 DIAGNOSIS — I10 HYPERTENSION GOAL BP (BLOOD PRESSURE) < 140/90: ICD-10-CM

## 2020-07-13 RX ORDER — HYDROCHLOROTHIAZIDE 25 MG/1
25 TABLET ORAL DAILY
Qty: 90 TABLET | Refills: 0 | Status: SHIPPED | OUTPATIENT
Start: 2020-07-13 | End: 2020-10-19 | Stop reason: SDUPTHER

## 2020-07-13 RX ORDER — AMLODIPINE BESYLATE 5 MG/1
5 TABLET ORAL DAILY
Qty: 90 TABLET | Refills: 0 | Status: SHIPPED | OUTPATIENT
Start: 2020-07-13 | End: 2020-10-19 | Stop reason: SDUPTHER

## 2020-07-14 ENCOUNTER — LAB VISIT (OUTPATIENT)
Dept: PRIMARY CARE CLINIC | Facility: OTHER | Age: 40
End: 2020-07-14
Attending: INTERNAL MEDICINE
Payer: COMMERCIAL

## 2020-07-14 DIAGNOSIS — Z03.818 ENCOUNTER FOR OBSERVATION FOR SUSPECTED EXPOSURE TO OTHER BIOLOGICAL AGENTS RULED OUT: ICD-10-CM

## 2020-07-14 PROCEDURE — U0003 INFECTIOUS AGENT DETECTION BY NUCLEIC ACID (DNA OR RNA); SEVERE ACUTE RESPIRATORY SYNDROME CORONAVIRUS 2 (SARS-COV-2) (CORONAVIRUS DISEASE [COVID-19]), AMPLIFIED PROBE TECHNIQUE, MAKING USE OF HIGH THROUGHPUT TECHNOLOGIES AS DESCRIBED BY CMS-2020-01-R: HCPCS

## 2020-07-18 LAB — SARS-COV-2 RNA RESP QL NAA+PROBE: NOT DETECTED

## 2020-07-22 ENCOUNTER — HOSPITAL ENCOUNTER (OUTPATIENT)
Dept: RADIOLOGY | Facility: HOSPITAL | Age: 40
Discharge: HOME | End: 2020-07-22
Attending: FAMILY MEDICINE
Payer: COMMERCIAL

## 2020-07-22 ENCOUNTER — OFFICE VISIT (OUTPATIENT)
Dept: FAMILY MEDICINE | Facility: CLINIC | Age: 40
End: 2020-07-22
Attending: FAMILY MEDICINE
Payer: COMMERCIAL

## 2020-07-22 VITALS
BODY MASS INDEX: 36.72 KG/M2 | DIASTOLIC BLOOD PRESSURE: 86 MMHG | WEIGHT: 233.94 LBS | HEART RATE: 117 BPM | OXYGEN SATURATION: 100 % | SYSTOLIC BLOOD PRESSURE: 124 MMHG | TEMPERATURE: 98 F | HEIGHT: 67 IN

## 2020-07-22 DIAGNOSIS — R73.03 PRE-DIABETES: ICD-10-CM

## 2020-07-22 DIAGNOSIS — Z12.39 BREAST CANCER SCREENING: ICD-10-CM

## 2020-07-22 DIAGNOSIS — R07.9 CHEST PAIN, UNSPECIFIED TYPE: ICD-10-CM

## 2020-07-22 DIAGNOSIS — R07.9 CHEST PAIN, UNSPECIFIED TYPE: Primary | ICD-10-CM

## 2020-07-22 PROCEDURE — 3079F PR MOST RECENT DIASTOLIC BLOOD PRESSURE 80-89 MM HG: ICD-10-PCS | Mod: CPTII,S$GLB,, | Performed by: FAMILY MEDICINE

## 2020-07-22 PROCEDURE — 99214 PR OFFICE/OUTPT VISIT, EST, LEVL IV, 30-39 MIN: ICD-10-PCS | Mod: S$GLB,,, | Performed by: FAMILY MEDICINE

## 2020-07-22 PROCEDURE — 99214 OFFICE O/P EST MOD 30 MIN: CPT | Mod: S$GLB,,, | Performed by: FAMILY MEDICINE

## 2020-07-22 PROCEDURE — 71046 XR CHEST PA AND LATERAL: ICD-10-PCS | Mod: 26,,, | Performed by: RADIOLOGY

## 2020-07-22 PROCEDURE — 3074F PR MOST RECENT SYSTOLIC BLOOD PRESSURE < 130 MM HG: ICD-10-PCS | Mod: CPTII,S$GLB,, | Performed by: FAMILY MEDICINE

## 2020-07-22 PROCEDURE — 3079F DIAST BP 80-89 MM HG: CPT | Mod: CPTII,S$GLB,, | Performed by: FAMILY MEDICINE

## 2020-07-22 PROCEDURE — 93005 EKG 12-LEAD: ICD-10-PCS | Mod: S$GLB,,, | Performed by: FAMILY MEDICINE

## 2020-07-22 PROCEDURE — 71046 X-RAY EXAM CHEST 2 VIEWS: CPT | Mod: TC,PO

## 2020-07-22 PROCEDURE — 3008F BODY MASS INDEX DOCD: CPT | Mod: CPTII,S$GLB,, | Performed by: FAMILY MEDICINE

## 2020-07-22 PROCEDURE — 3074F SYST BP LT 130 MM HG: CPT | Mod: CPTII,S$GLB,, | Performed by: FAMILY MEDICINE

## 2020-07-22 PROCEDURE — 99999 PR PBB SHADOW E&M-EST. PATIENT-LVL IV: ICD-10-PCS | Mod: PBBFAC,,, | Performed by: FAMILY MEDICINE

## 2020-07-22 PROCEDURE — 99999 PR PBB SHADOW E&M-EST. PATIENT-LVL IV: CPT | Mod: PBBFAC,,, | Performed by: FAMILY MEDICINE

## 2020-07-22 PROCEDURE — 3008F PR BODY MASS INDEX (BMI) DOCUMENTED: ICD-10-PCS | Mod: CPTII,S$GLB,, | Performed by: FAMILY MEDICINE

## 2020-07-22 PROCEDURE — 93005 ELECTROCARDIOGRAM TRACING: CPT | Mod: S$GLB,,, | Performed by: FAMILY MEDICINE

## 2020-07-22 PROCEDURE — 71046 X-RAY EXAM CHEST 2 VIEWS: CPT | Mod: 26,,, | Performed by: RADIOLOGY

## 2020-07-22 PROCEDURE — 93010 EKG 12-LEAD: ICD-10-PCS | Mod: S$GLB,,, | Performed by: INTERNAL MEDICINE

## 2020-07-22 PROCEDURE — 93010 ELECTROCARDIOGRAM REPORT: CPT | Mod: S$GLB,,, | Performed by: INTERNAL MEDICINE

## 2020-07-22 RX ORDER — DICLOFENAC SODIUM 75 MG/1
75 TABLET, DELAYED RELEASE ORAL 2 TIMES DAILY
Qty: 20 TABLET | Refills: 0 | Status: SHIPPED | OUTPATIENT
Start: 2020-07-22 | End: 2021-02-17

## 2020-07-22 NOTE — PROGRESS NOTES
Subjective:       Patient ID: Judie Munoz is a 39 y.o. female.    Chief Complaint: Chest Pain (feels sore to touch chest)    39 y old female with severe obesity , htn , pre dm with Chest pain for 2 w . Mainly when she coughs, sneezes or touches Left upper chest . Takes Advil at night which  Helps . No anxiety , sob , GI symptoms .     Review of Systems   Constitutional: Negative.  Negative for activity change and unexpected weight change.   HENT: Negative.  Negative for hearing loss, rhinorrhea and trouble swallowing.    Eyes: Negative.  Negative for discharge and visual disturbance.   Respiratory: Positive for chest tightness. Negative for wheezing.    Cardiovascular: Positive for chest pain. Negative for palpitations.   Gastrointestinal: Negative.  Negative for blood in stool, constipation, diarrhea and vomiting.   Endocrine: Negative for polydipsia and polyuria.   Genitourinary: Negative.  Negative for difficulty urinating, dysuria, hematuria and menstrual problem.   Musculoskeletal: Negative.  Negative for arthralgias, joint swelling and neck pain.   Skin: Negative.    Neurological: Negative for weakness and headaches.   Hematological: Negative.    Psychiatric/Behavioral: Negative for confusion and dysphoric mood.       Objective:      Physical Exam  Constitutional:       General: She is not in acute distress.     Appearance: She is well-developed. She is obese. She is not diaphoretic.   HENT:      Head: Normocephalic and atraumatic.      Right Ear: External ear normal.      Left Ear: External ear normal.      Mouth/Throat:      Pharynx: No oropharyngeal exudate.   Eyes:      General: No scleral icterus.        Right eye: No discharge.         Left eye: No discharge.      Conjunctiva/sclera: Conjunctivae normal.      Pupils: Pupils are equal, round, and reactive to light.   Neck:      Musculoskeletal: Normal range of motion and neck supple.      Thyroid: No thyromegaly.      Vascular: No JVD.      Trachea:  No tracheal deviation.   Cardiovascular:      Rate and Rhythm: Normal rate and regular rhythm.      Heart sounds: Normal heart sounds. No murmur. No friction rub. No gallop.    Pulmonary:      Effort: Pulmonary effort is normal. No respiratory distress.      Breath sounds: Normal breath sounds. No stridor. No wheezing or rales.   Chest:      Chest wall: Tenderness present.       Abdominal:      General: Bowel sounds are normal. There is no distension.      Palpations: Abdomen is soft.      Tenderness: There is no abdominal tenderness. There is no guarding or rebound.   Musculoskeletal: Normal range of motion.   Lymphadenopathy:      Cervical: No cervical adenopathy.   Skin:     General: Skin is warm and dry.   Neurological:      Mental Status: She is alert and oriented to person, place, and time.   Psychiatric:         Behavior: Behavior normal.         Thought Content: Thought content normal.         Judgment: Judgment normal.         Assessment:       1. Chest pain, unspecified type    2. Pre-diabetes    3. Breast cancer screening        Plan:     Judie was seen today for chest pain.    Diagnoses and all orders for this visit:    Chest pain, unspecified type  -     X-Ray Chest PA And Lateral; Future  -     IN OFFICE EKG 12-LEAD (to Muse)    Pre-diabetes  -     Hemoglobin A1C; Future    Breast cancer screening  -     Mammo Digital Screening Bilateral With CAD; Future    Other orders  -     diclofenac (VOLTAREN) 75 MG EC tablet; Take 1 tablet (75 mg total) by mouth 2 (two) times daily.     WS were discussed.   We will cont to f.u .

## 2020-07-27 ENCOUNTER — TELEPHONE (OUTPATIENT)
Dept: FAMILY MEDICINE | Facility: CLINIC | Age: 40
End: 2020-07-27

## 2020-07-27 NOTE — TELEPHONE ENCOUNTER
----- Message from Paula Roque MD sent at 7/26/2020  9:50 PM CDT -----  Irregular heart beat . HOw is chest pain ?

## 2020-10-06 ENCOUNTER — PATIENT MESSAGE (OUTPATIENT)
Dept: ADMINISTRATIVE | Facility: HOSPITAL | Age: 40
End: 2020-10-06

## 2020-10-19 ENCOUNTER — OFFICE VISIT (OUTPATIENT)
Dept: INTERNAL MEDICINE | Facility: CLINIC | Age: 40
End: 2020-10-19
Payer: COMMERCIAL

## 2020-10-19 ENCOUNTER — PATIENT MESSAGE (OUTPATIENT)
Dept: INTERNAL MEDICINE | Facility: CLINIC | Age: 40
End: 2020-10-19

## 2020-10-19 ENCOUNTER — HOSPITAL ENCOUNTER (OUTPATIENT)
Dept: RADIOLOGY | Facility: HOSPITAL | Age: 40
Discharge: HOME | End: 2020-10-19
Attending: FAMILY MEDICINE
Payer: COMMERCIAL

## 2020-10-19 VITALS
BODY MASS INDEX: 36.88 KG/M2 | OXYGEN SATURATION: 97 % | TEMPERATURE: 98 F | SYSTOLIC BLOOD PRESSURE: 134 MMHG | WEIGHT: 235 LBS | DIASTOLIC BLOOD PRESSURE: 86 MMHG | HEIGHT: 67 IN | HEART RATE: 102 BPM

## 2020-10-19 DIAGNOSIS — Z00.00 ROUTINE CHECK-UP: ICD-10-CM

## 2020-10-19 DIAGNOSIS — M79.671 RIGHT FOOT PAIN: Primary | ICD-10-CM

## 2020-10-19 DIAGNOSIS — M79.671 RIGHT FOOT PAIN: ICD-10-CM

## 2020-10-19 DIAGNOSIS — I10 ESSENTIAL HYPERTENSION: ICD-10-CM

## 2020-10-19 DIAGNOSIS — I10 HYPERTENSION GOAL BP (BLOOD PRESSURE) < 140/90: ICD-10-CM

## 2020-10-19 PROCEDURE — 73630 XR FOOT COMPLETE 3 VIEW RIGHT: ICD-10-PCS | Mod: 26,RT,, | Performed by: RADIOLOGY

## 2020-10-19 PROCEDURE — 3008F BODY MASS INDEX DOCD: CPT | Mod: CPTII,S$GLB,, | Performed by: FAMILY MEDICINE

## 2020-10-19 PROCEDURE — 73630 X-RAY EXAM OF FOOT: CPT | Mod: TC,RT

## 2020-10-19 PROCEDURE — 3075F PR MOST RECENT SYSTOLIC BLOOD PRESS GE 130-139MM HG: ICD-10-PCS | Mod: CPTII,S$GLB,, | Performed by: FAMILY MEDICINE

## 2020-10-19 PROCEDURE — 99999 PR PBB SHADOW E&M-EST. PATIENT-LVL IV: CPT | Mod: PBBFAC,,, | Performed by: FAMILY MEDICINE

## 2020-10-19 PROCEDURE — 3079F DIAST BP 80-89 MM HG: CPT | Mod: CPTII,S$GLB,, | Performed by: FAMILY MEDICINE

## 2020-10-19 PROCEDURE — 99213 PR OFFICE/OUTPT VISIT, EST, LEVL III, 20-29 MIN: ICD-10-PCS | Mod: S$GLB,,, | Performed by: FAMILY MEDICINE

## 2020-10-19 PROCEDURE — 3079F PR MOST RECENT DIASTOLIC BLOOD PRESSURE 80-89 MM HG: ICD-10-PCS | Mod: CPTII,S$GLB,, | Performed by: FAMILY MEDICINE

## 2020-10-19 PROCEDURE — 3008F PR BODY MASS INDEX (BMI) DOCUMENTED: ICD-10-PCS | Mod: CPTII,S$GLB,, | Performed by: FAMILY MEDICINE

## 2020-10-19 PROCEDURE — 73630 X-RAY EXAM OF FOOT: CPT | Mod: 26,RT,, | Performed by: RADIOLOGY

## 2020-10-19 PROCEDURE — 99213 OFFICE O/P EST LOW 20 MIN: CPT | Mod: S$GLB,,, | Performed by: FAMILY MEDICINE

## 2020-10-19 PROCEDURE — 99999 PR PBB SHADOW E&M-EST. PATIENT-LVL IV: ICD-10-PCS | Mod: PBBFAC,,, | Performed by: FAMILY MEDICINE

## 2020-10-19 PROCEDURE — 3075F SYST BP GE 130 - 139MM HG: CPT | Mod: CPTII,S$GLB,, | Performed by: FAMILY MEDICINE

## 2020-10-19 RX ORDER — HYDROCHLOROTHIAZIDE 25 MG/1
25 TABLET ORAL DAILY
Qty: 90 TABLET | Refills: 0 | Status: SHIPPED | OUTPATIENT
Start: 2020-10-19 | End: 2021-02-26 | Stop reason: SDUPTHER

## 2020-10-19 RX ORDER — AMLODIPINE BESYLATE 5 MG/1
5 TABLET ORAL DAILY
Qty: 90 TABLET | Refills: 0 | Status: SHIPPED | OUTPATIENT
Start: 2020-10-19 | End: 2021-02-26 | Stop reason: SDUPTHER

## 2020-10-19 NOTE — PROGRESS NOTES
Judie Munoz  10/19/2020  [id number]    Ritika Tong MD  Patient Care Team:  Ritika Tong MD as PCP - General (Family Medicine)  Lilibeth Callejas LPN as Care Coordinator (Internal Medicine)  Has the patient seen any provider outside of the [facility] network since the last visit? (no). If yes, HIPPA forms completed and records requested.        Visit Type:an urgent visit for a new problem    Chief Complaint:  Chief Complaint   Patient presents with    Foot Pain     right foot pain. she went to [employer] and box of peel and stick tile fell on her right foot. She went to urgent care and no fractures shown on xray just swelling. she wanted to follow up with her pcp       History of Present Illness:    40 year old, acute visit for foot pain    Thursday OCt 15, 2020, walking in [employer], a box of tiles fell off shelf and onto her foot.  She was in sandals. Pain immediate and had swelling.    She went to [facility].   Xray done there, reported as negative. Just soft tissue swelling.    She reports still with pain when flexing foot. Had some bruising, and that is still present.    She was told to take Iburporfen and stay off of it and follow up with PCP.    Answers for HPI/ROS submitted by the patient on 10/18/2020   activity change: No  unexpected weight change: No  rhinorrhea: No  trouble swallowing: No  visual disturbance: No  chest tightness: No  polyuria: No  difficulty urinating: No  menstrual problem: No  joint swelling: No  arthralgias: No  confusion: No  dysphoric mood: No      History:  Past Medical History:   Diagnosis Date    Hypertension      Past Surgical History:   Procedure Laterality Date    D&C x 3       miscarriages     Family History   Problem Relation Age of Onset    Heart disease Mother         a fib    Hypertension Mother     Diabetes Father     Cancer Maternal Aunt     Diabetes Paternal Aunt     Diabetes Maternal Grandmother     Heart disease Maternal Grandmother      Kidney disease Maternal Grandmother      Social History     Socioeconomic History    Marital status: Single     Spouse name: Not on file    Number of children: 2    Years of education: Not on file    Highest education level: Not on file   Occupational History     Employer: [employer]   Social Needs    Financial resource strain: Not hard at all    Food insecurity     Worry: Never true     Inability: Never true    Transportation needs     Medical: No     Non-medical: No   Tobacco Use    Smoking status: Never Smoker    Smokeless tobacco: Never Used   Substance and Sexual Activity    Alcohol use: No     Alcohol/week: 0.0 standard drinks     Frequency: Monthly or less     Drinks per session: 1 or 2     Binge frequency: Never    Drug use: No    Sexual activity: Not Currently     Partners: Male   Lifestyle    Physical activity     Days per week: 2 days     Minutes per session: 30 min    Stress: Very much   Relationships    Social connections     Talks on phone: Three times a week     Gets together: Once a week     Attends Baptism service: Not on file     Active member of club or organization: No     Attends meetings of clubs or organizations: Never     Relationship status: Never    Other Topics Concern    Not on file   Social History Narrative    Not on file     Patient Active Problem List   Diagnosis    Hypertension    Migraines    Thyroid nodule     Review of patient's allergies indicates:  No Known Allergies    The following were reviewed at this visit: active problem list, medication list, allergies, family history, social history, and health maintenance.    Medications:  Current Outpatient Medications on File Prior to Visit   Medication Sig Dispense Refill    butalbital-acetaminophen-caffeine -40 mg (FIORICET, ESGIC) -40 mg per tablet Take 1 tablet by mouth every 8 (eight) hours as needed. 30 tablet 1    levonorgestrel (MIRENA IU) by Intrauterine route.       topiramate (TOPAMAX) 25 MG tablet Take 1 tablet (25 mg total) by mouth 2 (two) times daily. 60 tablet 11    ZOLMitriptan (ZOMIG-ZMT) 5 MG disintegrating tablet Take 1 tablet (5 mg total) by mouth as needed. 6 tablet 2    [DISCONTINUED] amLODIPine (NORVASC) 5 MG tablet Take 1 tablet (5 mg total) by mouth once daily. 90 tablet 0    [DISCONTINUED] hydroCHLOROthiazide (HYDRODIURIL) 25 MG tablet Take 1 tablet (25 mg total) by mouth once daily. 90 tablet 0    diclofenac (VOLTAREN) 75 MG EC tablet Take 1 tablet (75 mg total) by mouth 2 (two) times daily. (Patient not taking: Reported on 10/19/2020) 20 tablet 0     No current facility-administered medications on file prior to visit.        Medications have been reviewed and reconciled with patient at this visit.  Barriers to medications present (no)    Adverse reactions to current medications (no)    Over the counter medications reviewed (Yes ), and if needed added to active Medication list at this visit.     Exam:  Wt Readings from Last 3 Encounters:   10/19/20 106.6 kg (235 lb 0.2 oz)   07/22/20 106.1 kg (233 lb 14.5 oz)   03/02/20 101.8 kg (224 lb 6.9 oz)     Temp Readings from Last 3 Encounters:   10/19/20 98.2 °F (36.8 °C) (Tympanic)   07/22/20 98.3 °F (36.8 °C) (Tympanic)   06/24/20 98.2 °F (36.8 °C) (Oral)     BP Readings from Last 3 Encounters:   10/19/20 134/86   07/22/20 124/86   03/02/20 122/84     Pulse Readings from Last 3 Encounters:   10/19/20 102   07/22/20 (!) 117   03/02/20 101     Body mass index is 36.81 kg/m².      Review of Systems   HENT: Negative for hearing loss.    Eyes: Negative for discharge.   Respiratory: Negative for wheezing.    Cardiovascular: Negative for chest pain and palpitations.   Gastrointestinal: Negative for blood in stool, constipation, diarrhea and vomiting.   Genitourinary: Negative for dysuria and hematuria.   Musculoskeletal: Positive for joint pain. Negative for neck pain.   Neurological: Negative for weakness and  headaches.   Endo/Heme/Allergies: Negative for polydipsia.     Physical Exam  Vitals signs and nursing note reviewed.   Constitutional:       General: She is not in acute distress.     Appearance: She is well-developed. She is not diaphoretic.   HENT:      Head: Normocephalic and atraumatic.      Right Ear: External ear normal.      Left Ear: External ear normal.      Nose: Nose normal.      Mouth/Throat:      Pharynx: No oropharyngeal exudate.   Eyes:      General:         Right eye: No discharge.         Left eye: No discharge.      Conjunctiva/sclera: Conjunctivae normal.      Pupils: Pupils are equal, round, and reactive to light.   Neck:      Musculoskeletal: Normal range of motion and neck supple.      Thyroid: No thyromegaly.   Cardiovascular:      Rate and Rhythm: Normal rate and regular rhythm.      Heart sounds: Normal heart sounds. No murmur.   Pulmonary:      Effort: Pulmonary effort is normal. No respiratory distress.      Breath sounds: Normal breath sounds. No wheezing.   Abdominal:      General: Bowel sounds are normal. There is no distension.      Palpations: Abdomen is soft. There is no mass.      Tenderness: There is no abdominal tenderness.   Musculoskeletal:         General: Signs of injury present.      Right ankle: She exhibits decreased range of motion, swelling and ecchymosis. She exhibits no deformity.        Feet:       Comments: Top of foot, 3rd-4th metatarsal, pain with palpation and flexion of foot.  Normal Cap refill  Mild echymosis noted  Mild swelling top of foot noted.    No ankle mortise issues     Lymphadenopathy:      Cervical: No cervical adenopathy.   Skin:     Capillary Refill: Capillary refill takes less than 2 seconds.   Neurological:      Mental Status: She is alert and oriented to person, place, and time.      Cranial Nerves: No cranial nerve deficit.   Psychiatric:         Behavior: Behavior normal.         Thought Content: Thought content normal.         Judgment:  Judgment normal.         Laboratory Reviewed ({N/A)  Lab Results   Component Value Date    WBC 9.38 04/24/2017    HGB 13.1 04/24/2017    HCT 40.5 04/24/2017     04/24/2017    CHOL 164 02/03/2020    TRIG 57 02/03/2020    HDL 45 02/03/2020    ALT 15 02/03/2020    AST 19 02/03/2020     02/03/2020    K 3.3 (L) 02/03/2020     02/03/2020    CREATININE 0.8 02/03/2020    BUN 10 02/03/2020    CO2 29 02/03/2020    TSH 1.099 04/24/2017    HGBA1C 5.8 (H) 07/22/2020       Judie was seen today for foot pain.    Diagnoses and all orders for this visit:    Right foot pain  -     X-Ray Foot 2 View Right; Future    Essential hypertension  -     amLODIPine (NORVASC) 5 MG tablet; Take 1 tablet (5 mg total) by mouth once daily.    Hypertension goal BP (blood pressure) < 140/90  -     hydroCHLOROthiazide (HYDRODIURIL) 25 MG tablet; Take 1 tablet (25 mg total) by mouth once daily.    Routine check-up  -     Ambulatory referral/consult to Obstetrics / Gynecology; Future    RICE tx for now.  Work from home.              Care Plan/Goals: Reviewed  (N/A)  Goals        Patient Stated     Blood Pressure < 130/80 (pt-stated)        Other     Exercise at least 150 minutes per week.      Keep up the great work with getting 30 min of exercise most days per week!        Maintain a low sodium diet      Aim for less than 2000 mg sodium per day and look for items with less than 140 mg sodium per serving       Take at least one BP reading per week at various times of the day           Follow up: No follow-ups on file.    After visit summary was printed and given to patient upon discharge today.  Patient goals and care plan are included in After Visit Summary.

## 2020-10-27 ENCOUNTER — PATIENT OUTREACH (OUTPATIENT)
Dept: ADMINISTRATIVE | Facility: OTHER | Age: 40
End: 2020-10-27

## 2020-10-28 ENCOUNTER — HOSPITAL ENCOUNTER (OUTPATIENT)
Dept: RADIOLOGY | Facility: HOSPITAL | Age: 40
Discharge: HOME | End: 2020-10-28
Attending: FAMILY MEDICINE
Payer: COMMERCIAL

## 2020-10-28 VITALS — WEIGHT: 235 LBS | BODY MASS INDEX: 36.88 KG/M2 | HEIGHT: 67 IN

## 2020-10-28 DIAGNOSIS — Z12.39 BREAST CANCER SCREENING: ICD-10-CM

## 2020-10-28 PROCEDURE — 77067 SCR MAMMO BI INCL CAD: CPT | Mod: TC

## 2020-10-28 PROCEDURE — 77067 MAMMO DIGITAL SCREENING BILAT WITH TOMOSYNTHESIS_CAD: ICD-10-PCS | Mod: 26,,, | Performed by: RADIOLOGY

## 2020-10-28 PROCEDURE — 77063 BREAST TOMOSYNTHESIS BI: CPT | Mod: 26,,, | Performed by: RADIOLOGY

## 2020-10-28 PROCEDURE — 77063 MAMMO DIGITAL SCREENING BILAT WITH TOMOSYNTHESIS_CAD: ICD-10-PCS | Mod: 26,,, | Performed by: RADIOLOGY

## 2020-10-28 PROCEDURE — 77067 SCR MAMMO BI INCL CAD: CPT | Mod: 26,,, | Performed by: RADIOLOGY

## 2020-10-28 NOTE — PROGRESS NOTES
Health Maintenance Due   Topic Date Due    Hepatitis C Screening  1980    HIV Screening  09/29/1993    Cervical Cancer Screening  12/14/2019    Influenza Vaccine (1) 08/01/2020     Updates were requested from care everywhere.  Chart was reviewed for overdue Proactive [facility] Encounters (MERVAT) topics (CRS, Breast Cancer Screening, Eye exam)  Health Maintenance has been updated.  LINKS immunization registry triggered.  Immunizations were reconciled.

## 2020-11-04 ENCOUNTER — PATIENT MESSAGE (OUTPATIENT)
Dept: INTERNAL MEDICINE | Facility: CLINIC | Age: 40
End: 2020-11-04

## 2020-11-05 ENCOUNTER — HOSPITAL ENCOUNTER (OUTPATIENT)
Dept: RADIOLOGY | Facility: HOSPITAL | Age: 40
Discharge: HOME | End: 2020-11-05
Attending: FAMILY MEDICINE
Payer: COMMERCIAL

## 2020-11-05 DIAGNOSIS — R92.8 ABNORMAL MAMMOGRAM: ICD-10-CM

## 2020-11-05 PROCEDURE — 76642 ULTRASOUND BREAST LIMITED: CPT | Mod: 26,LT,, | Performed by: RADIOLOGY

## 2020-11-05 PROCEDURE — 77061 BREAST TOMOSYNTHESIS UNI: CPT | Mod: TC,LT

## 2020-11-05 PROCEDURE — 77061 BREAST TOMOSYNTHESIS UNI: CPT | Mod: 26,LT,, | Performed by: RADIOLOGY

## 2020-11-05 PROCEDURE — 77065 DX MAMMO INCL CAD UNI: CPT | Mod: TC,LT

## 2020-11-05 PROCEDURE — 77061 MAMMO DIGITAL DIAGNOSTIC LEFT WITH TOMO: ICD-10-PCS | Mod: 26,LT,, | Performed by: RADIOLOGY

## 2020-11-05 PROCEDURE — 76642 US BREAST LEFT LIMITED: ICD-10-PCS | Mod: 26,LT,, | Performed by: RADIOLOGY

## 2020-11-05 PROCEDURE — 77065 MAMMO DIGITAL DIAGNOSTIC LEFT WITH TOMO: ICD-10-PCS | Mod: 26,LT,, | Performed by: RADIOLOGY

## 2020-11-05 PROCEDURE — 76642 ULTRASOUND BREAST LIMITED: CPT | Mod: TC,LT

## 2020-11-05 PROCEDURE — 77065 DX MAMMO INCL CAD UNI: CPT | Mod: 26,LT,, | Performed by: RADIOLOGY

## 2020-11-06 ENCOUNTER — PATIENT MESSAGE (OUTPATIENT)
Dept: INTERNAL MEDICINE | Facility: CLINIC | Age: 40
End: 2020-11-06

## 2020-11-19 ENCOUNTER — PATIENT MESSAGE (OUTPATIENT)
Dept: OBSTETRICS AND GYNECOLOGY | Facility: CLINIC | Age: 40
End: 2020-11-19

## 2021-02-17 ENCOUNTER — OFFICE VISIT (OUTPATIENT)
Dept: INTERNAL MEDICINE | Facility: CLINIC | Age: 41
End: 2021-02-17
Payer: COMMERCIAL

## 2021-02-17 DIAGNOSIS — J06.9 UPPER RESPIRATORY TRACT INFECTION, UNSPECIFIED TYPE: Primary | ICD-10-CM

## 2021-02-17 PROCEDURE — 99213 OFFICE O/P EST LOW 20 MIN: CPT | Mod: 95,,, | Performed by: FAMILY MEDICINE

## 2021-02-17 PROCEDURE — 99213 PR OFFICE/OUTPT VISIT, EST, LEVL III, 20-29 MIN: ICD-10-PCS | Mod: 95,,, | Performed by: FAMILY MEDICINE

## 2021-02-17 RX ORDER — ALBUTEROL SULFATE 90 UG/1
2 AEROSOL, METERED RESPIRATORY (INHALATION) EVERY 6 HOURS PRN
Qty: 18 G | Refills: 0 | Status: SHIPPED | OUTPATIENT
Start: 2021-02-17 | End: 2021-12-28 | Stop reason: SDUPTHER

## 2021-02-17 RX ORDER — PROMETHAZINE HYDROCHLORIDE AND DEXTROMETHORPHAN HYDROBROMIDE 6.25; 15 MG/5ML; MG/5ML
5 SYRUP ORAL EVERY 4 HOURS PRN
Qty: 180 ML | Refills: 0 | Status: SHIPPED | OUTPATIENT
Start: 2021-02-17 | End: 2021-02-27

## 2021-02-26 ENCOUNTER — TELEPHONE (OUTPATIENT)
Dept: RADIOLOGY | Facility: HOSPITAL | Age: 41
End: 2021-02-26

## 2021-02-26 DIAGNOSIS — I10 ESSENTIAL HYPERTENSION: ICD-10-CM

## 2021-02-26 DIAGNOSIS — I10 HYPERTENSION GOAL BP (BLOOD PRESSURE) < 140/90: ICD-10-CM

## 2021-02-26 RX ORDER — AMLODIPINE BESYLATE 5 MG/1
5 TABLET ORAL DAILY
Qty: 90 TABLET | Refills: 0 | Status: SHIPPED | OUTPATIENT
Start: 2021-02-26 | End: 2021-07-08 | Stop reason: SDUPTHER

## 2021-02-26 RX ORDER — HYDROCHLOROTHIAZIDE 25 MG/1
25 TABLET ORAL DAILY
Qty: 90 TABLET | Refills: 0 | Status: SHIPPED | OUTPATIENT
Start: 2021-02-26 | End: 2021-07-08 | Stop reason: SDUPTHER

## 2021-03-01 ENCOUNTER — HOSPITAL ENCOUNTER (OUTPATIENT)
Dept: RADIOLOGY | Facility: HOSPITAL | Age: 41
Discharge: HOME | End: 2021-03-01
Attending: OTOLARYNGOLOGY
Payer: COMMERCIAL

## 2021-03-01 DIAGNOSIS — E04.1 THYROID NODULE: ICD-10-CM

## 2021-03-01 PROCEDURE — 76536 US SOFT TISSUE HEAD NECK THYROID: ICD-10-PCS | Mod: 26,,, | Performed by: RADIOLOGY

## 2021-03-01 PROCEDURE — 76536 US EXAM OF HEAD AND NECK: CPT | Mod: TC

## 2021-03-01 PROCEDURE — 76536 US EXAM OF HEAD AND NECK: CPT | Mod: 26,,, | Performed by: RADIOLOGY

## 2021-03-02 ENCOUNTER — TELEPHONE (OUTPATIENT)
Dept: OTOLARYNGOLOGY | Facility: CLINIC | Age: 41
End: 2021-03-02

## 2021-03-02 DIAGNOSIS — E04.1 THYROID NODULE: Primary | ICD-10-CM

## 2021-03-08 RX ORDER — BUTALBITAL, ACETAMINOPHEN AND CAFFEINE 50; 325; 40 MG/1; MG/1; MG/1
1 TABLET ORAL EVERY 8 HOURS PRN
Qty: 30 TABLET | Refills: 1 | Status: SHIPPED | OUTPATIENT
Start: 2021-03-08 | End: 2022-05-09 | Stop reason: SDUPTHER

## 2021-03-16 RX ORDER — ZOLMITRIPTAN 5 MG/1
5 TABLET, ORALLY DISINTEGRATING ORAL
Qty: 6 TABLET | Refills: 2 | OUTPATIENT
Start: 2021-03-16

## 2021-03-16 RX ORDER — ZOLMITRIPTAN 5 MG/1
5 TABLET, ORALLY DISINTEGRATING ORAL
Qty: 18 TABLET | Refills: 1 | Status: SHIPPED | OUTPATIENT
Start: 2021-03-16

## 2021-03-19 ENCOUNTER — PATIENT MESSAGE (OUTPATIENT)
Dept: INTERNAL MEDICINE | Facility: CLINIC | Age: 41
End: 2021-03-19

## 2021-03-22 ENCOUNTER — PATIENT MESSAGE (OUTPATIENT)
Dept: INTERNAL MEDICINE | Facility: CLINIC | Age: 41
End: 2021-03-22

## 2021-03-24 ENCOUNTER — OFFICE VISIT (OUTPATIENT)
Dept: INTERNAL MEDICINE | Facility: CLINIC | Age: 41
End: 2021-03-24
Payer: COMMERCIAL

## 2021-03-24 VITALS
RESPIRATION RATE: 18 BRPM | DIASTOLIC BLOOD PRESSURE: 72 MMHG | OXYGEN SATURATION: 99 % | SYSTOLIC BLOOD PRESSURE: 126 MMHG | TEMPERATURE: 98 F | BODY MASS INDEX: 35.59 KG/M2 | WEIGHT: 226.75 LBS | HEIGHT: 67 IN | HEART RATE: 99 BPM

## 2021-03-24 DIAGNOSIS — R30.0 DYSURIA: Primary | ICD-10-CM

## 2021-03-24 LAB
BACTERIA #/AREA URNS HPF: ABNORMAL /HPF
BILIRUB UR QL STRIP: NEGATIVE
CLARITY UR: ABNORMAL
COLOR UR: YELLOW
GLUCOSE UR QL STRIP: NEGATIVE
HGB UR QL STRIP: ABNORMAL
KETONES UR QL STRIP: ABNORMAL
LEUKOCYTE ESTERASE UR QL STRIP: NEGATIVE
MICROSCOPIC COMMENT: ABNORMAL
NITRITE UR QL STRIP: NEGATIVE
PH UR STRIP: 6.5 [PH] (ref 5–8)
PROT UR QL STRIP: NEGATIVE
RBC #/AREA URNS HPF: 5 /HPF (ref 0–4)
SP GR UR STRIP: 1.02 (ref 1–1.03)
SQUAMOUS #/AREA URNS HPF: ABNORMAL /HPF
URN SPEC COLLECT METH UR: ABNORMAL
WBC #/AREA URNS HPF: 1 /HPF (ref 0–5)

## 2021-03-24 PROCEDURE — 3008F BODY MASS INDEX DOCD: CPT | Mod: CPTII,S$GLB,, | Performed by: FAMILY MEDICINE

## 2021-03-24 PROCEDURE — 3074F PR MOST RECENT SYSTOLIC BLOOD PRESSURE < 130 MM HG: ICD-10-PCS | Mod: CPTII,S$GLB,, | Performed by: FAMILY MEDICINE

## 2021-03-24 PROCEDURE — 3078F PR MOST RECENT DIASTOLIC BLOOD PRESSURE < 80 MM HG: ICD-10-PCS | Mod: CPTII,S$GLB,, | Performed by: FAMILY MEDICINE

## 2021-03-24 PROCEDURE — 99213 OFFICE O/P EST LOW 20 MIN: CPT | Mod: S$GLB,,, | Performed by: FAMILY MEDICINE

## 2021-03-24 PROCEDURE — 3078F DIAST BP <80 MM HG: CPT | Mod: CPTII,S$GLB,, | Performed by: FAMILY MEDICINE

## 2021-03-24 PROCEDURE — 99213 PR OFFICE/OUTPT VISIT, EST, LEVL III, 20-29 MIN: ICD-10-PCS | Mod: S$GLB,,, | Performed by: FAMILY MEDICINE

## 2021-03-24 PROCEDURE — 1125F AMNT PAIN NOTED PAIN PRSNT: CPT | Mod: S$GLB,,, | Performed by: FAMILY MEDICINE

## 2021-03-24 PROCEDURE — 81000 URINALYSIS NONAUTO W/SCOPE: CPT | Performed by: FAMILY MEDICINE

## 2021-03-24 PROCEDURE — 3008F PR BODY MASS INDEX (BMI) DOCUMENTED: ICD-10-PCS | Mod: CPTII,S$GLB,, | Performed by: FAMILY MEDICINE

## 2021-03-24 PROCEDURE — 99999 PR PBB SHADOW E&M-EST. PATIENT-LVL III: CPT | Mod: PBBFAC,,, | Performed by: FAMILY MEDICINE

## 2021-03-24 PROCEDURE — 3074F SYST BP LT 130 MM HG: CPT | Mod: CPTII,S$GLB,, | Performed by: FAMILY MEDICINE

## 2021-03-24 PROCEDURE — 1125F PR PAIN SEVERITY QUANTIFIED, PAIN PRESENT: ICD-10-PCS | Mod: S$GLB,,, | Performed by: FAMILY MEDICINE

## 2021-03-24 PROCEDURE — 99999 PR PBB SHADOW E&M-EST. PATIENT-LVL III: ICD-10-PCS | Mod: PBBFAC,,, | Performed by: FAMILY MEDICINE

## 2021-03-24 RX ORDER — CIPROFLOXACIN 500 MG/1
500 TABLET ORAL 2 TIMES DAILY
Qty: 6 TABLET | Refills: 0 | Status: SHIPPED | OUTPATIENT
Start: 2021-03-24 | End: 2021-03-27

## 2021-04-19 ENCOUNTER — OFFICE VISIT (OUTPATIENT)
Dept: INTERNAL MEDICINE | Facility: CLINIC | Age: 41
End: 2021-04-19
Payer: COMMERCIAL

## 2021-04-19 VITALS
TEMPERATURE: 99 F | HEART RATE: 106 BPM | SYSTOLIC BLOOD PRESSURE: 128 MMHG | WEIGHT: 225.75 LBS | OXYGEN SATURATION: 99 % | DIASTOLIC BLOOD PRESSURE: 84 MMHG | HEIGHT: 67 IN | BODY MASS INDEX: 35.43 KG/M2

## 2021-04-19 DIAGNOSIS — R92.8 ABNORMAL MAMMOGRAPHY: Primary | ICD-10-CM

## 2021-04-19 PROCEDURE — 99999 PR PBB SHADOW E&M-EST. PATIENT-LVL IV: ICD-10-PCS | Mod: PBBFAC,,, | Performed by: FAMILY MEDICINE

## 2021-04-19 PROCEDURE — 1126F PR PAIN SEVERITY QUANTIFIED, NO PAIN PRESENT: ICD-10-PCS | Mod: S$GLB,,, | Performed by: FAMILY MEDICINE

## 2021-04-19 PROCEDURE — 3008F PR BODY MASS INDEX (BMI) DOCUMENTED: ICD-10-PCS | Mod: CPTII,S$GLB,, | Performed by: FAMILY MEDICINE

## 2021-04-19 PROCEDURE — 99213 OFFICE O/P EST LOW 20 MIN: CPT | Mod: S$GLB,,, | Performed by: FAMILY MEDICINE

## 2021-04-19 PROCEDURE — 1126F AMNT PAIN NOTED NONE PRSNT: CPT | Mod: S$GLB,,, | Performed by: FAMILY MEDICINE

## 2021-04-19 PROCEDURE — 3008F BODY MASS INDEX DOCD: CPT | Mod: CPTII,S$GLB,, | Performed by: FAMILY MEDICINE

## 2021-04-19 PROCEDURE — 99213 PR OFFICE/OUTPT VISIT, EST, LEVL III, 20-29 MIN: ICD-10-PCS | Mod: S$GLB,,, | Performed by: FAMILY MEDICINE

## 2021-04-19 PROCEDURE — 99999 PR PBB SHADOW E&M-EST. PATIENT-LVL IV: CPT | Mod: PBBFAC,,, | Performed by: FAMILY MEDICINE

## 2021-04-19 RX ORDER — PHENTERMINE HYDROCHLORIDE 37.5 MG/1
37.5 TABLET ORAL
COMMUNITY
End: 2023-03-28

## 2021-05-05 ENCOUNTER — TELEPHONE (OUTPATIENT)
Dept: RADIOLOGY | Facility: HOSPITAL | Age: 41
End: 2021-05-05

## 2021-05-06 ENCOUNTER — OFFICE VISIT (OUTPATIENT)
Dept: URGENT CARE | Facility: CLINIC | Age: 41
End: 2021-05-06
Payer: COMMERCIAL

## 2021-05-06 VITALS
DIASTOLIC BLOOD PRESSURE: 85 MMHG | HEART RATE: 90 BPM | HEIGHT: 67 IN | RESPIRATION RATE: 17 BRPM | OXYGEN SATURATION: 98 % | SYSTOLIC BLOOD PRESSURE: 126 MMHG | WEIGHT: 224.19 LBS | BODY MASS INDEX: 35.19 KG/M2 | TEMPERATURE: 98 F

## 2021-05-06 DIAGNOSIS — J03.90 TONSILLITIS: ICD-10-CM

## 2021-05-06 DIAGNOSIS — I10 ESSENTIAL HYPERTENSION: ICD-10-CM

## 2021-05-06 DIAGNOSIS — H92.01 RIGHT EAR PAIN: ICD-10-CM

## 2021-05-06 DIAGNOSIS — J02.9 PHARYNGITIS, UNSPECIFIED ETIOLOGY: Primary | ICD-10-CM

## 2021-05-06 LAB
CTP QC/QA: YES
S PYO RRNA THROAT QL PROBE: NEGATIVE

## 2021-05-06 PROCEDURE — 1125F AMNT PAIN NOTED PAIN PRSNT: CPT | Mod: S$GLB,,, | Performed by: NURSE PRACTITIONER

## 2021-05-06 PROCEDURE — 87880 STREP A ASSAY W/OPTIC: CPT | Mod: QW,S$GLB,, | Performed by: NURSE PRACTITIONER

## 2021-05-06 PROCEDURE — 99214 PR OFFICE/OUTPT VISIT, EST, LEVL IV, 30-39 MIN: ICD-10-PCS | Mod: 25,S$GLB,, | Performed by: NURSE PRACTITIONER

## 2021-05-06 PROCEDURE — 99999 PR PBB SHADOW E&M-EST. PATIENT-LVL IV: CPT | Mod: PBBFAC,,, | Performed by: NURSE PRACTITIONER

## 2021-05-06 PROCEDURE — 99214 OFFICE O/P EST MOD 30 MIN: CPT | Mod: 25,S$GLB,, | Performed by: NURSE PRACTITIONER

## 2021-05-06 PROCEDURE — 3008F PR BODY MASS INDEX (BMI) DOCUMENTED: ICD-10-PCS | Mod: CPTII,S$GLB,, | Performed by: NURSE PRACTITIONER

## 2021-05-06 PROCEDURE — 1125F PR PAIN SEVERITY QUANTIFIED, PAIN PRESENT: ICD-10-PCS | Mod: S$GLB,,, | Performed by: NURSE PRACTITIONER

## 2021-05-06 PROCEDURE — 99999 PR PBB SHADOW E&M-EST. PATIENT-LVL IV: ICD-10-PCS | Mod: PBBFAC,,, | Performed by: NURSE PRACTITIONER

## 2021-05-06 PROCEDURE — 87880 POCT RAPID STREP A: ICD-10-PCS | Mod: QW,S$GLB,, | Performed by: NURSE PRACTITIONER

## 2021-05-06 PROCEDURE — 3008F BODY MASS INDEX DOCD: CPT | Mod: CPTII,S$GLB,, | Performed by: NURSE PRACTITIONER

## 2021-05-06 RX ORDER — AMOXICILLIN AND CLAVULANATE POTASSIUM 875; 125 MG/1; MG/1
1 TABLET, FILM COATED ORAL EVERY 12 HOURS
Qty: 14 TABLET | Refills: 0 | Status: SHIPPED | OUTPATIENT
Start: 2021-05-06 | End: 2021-05-13

## 2021-05-06 RX ORDER — BENZONATATE 100 MG/1
200 CAPSULE ORAL 3 TIMES DAILY PRN
Qty: 60 CAPSULE | Refills: 1 | Status: SHIPPED | OUTPATIENT
Start: 2021-05-06 | End: 2021-12-23 | Stop reason: SDUPTHER

## 2021-05-07 ENCOUNTER — TELEPHONE (OUTPATIENT)
Dept: URGENT CARE | Facility: CLINIC | Age: 41
End: 2021-05-07

## 2021-05-10 ENCOUNTER — TELEPHONE (OUTPATIENT)
Dept: INTERNAL MEDICINE | Facility: CLINIC | Age: 41
End: 2021-05-10

## 2021-07-26 ENCOUNTER — OFFICE VISIT (OUTPATIENT)
Dept: URGENT CARE | Facility: CLINIC | Age: 41
End: 2021-07-26
Payer: COMMERCIAL

## 2021-07-26 VITALS
WEIGHT: 214.31 LBS | SYSTOLIC BLOOD PRESSURE: 154 MMHG | OXYGEN SATURATION: 99 % | DIASTOLIC BLOOD PRESSURE: 97 MMHG | TEMPERATURE: 98 F | BODY MASS INDEX: 33.56 KG/M2 | HEART RATE: 76 BPM

## 2021-07-26 DIAGNOSIS — B35.4 TINEA CORPORIS: Primary | ICD-10-CM

## 2021-07-26 DIAGNOSIS — Z20.822 EXPOSURE TO COVID-19 VIRUS: ICD-10-CM

## 2021-07-26 LAB
CTP QC/QA: YES
SARS-COV-2 RDRP RESP QL NAA+PROBE: NEGATIVE

## 2021-07-26 PROCEDURE — U0002: ICD-10-PCS | Mod: QW,S$GLB,, | Performed by: PHYSICIAN ASSISTANT

## 2021-07-26 PROCEDURE — 3080F DIAST BP >= 90 MM HG: CPT | Mod: CPTII,S$GLB,, | Performed by: PHYSICIAN ASSISTANT

## 2021-07-26 PROCEDURE — 1160F PR REVIEW ALL MEDS BY PRESCRIBER/CLIN PHARMACIST DOCUMENTED: ICD-10-PCS | Mod: CPTII,S$GLB,, | Performed by: PHYSICIAN ASSISTANT

## 2021-07-26 PROCEDURE — 1160F RVW MEDS BY RX/DR IN RCRD: CPT | Mod: CPTII,S$GLB,, | Performed by: PHYSICIAN ASSISTANT

## 2021-07-26 PROCEDURE — 3008F BODY MASS INDEX DOCD: CPT | Mod: CPTII,S$GLB,, | Performed by: PHYSICIAN ASSISTANT

## 2021-07-26 PROCEDURE — 99214 OFFICE O/P EST MOD 30 MIN: CPT | Mod: S$GLB,CS,, | Performed by: PHYSICIAN ASSISTANT

## 2021-07-26 PROCEDURE — 3080F PR MOST RECENT DIASTOLIC BLOOD PRESSURE >= 90 MM HG: ICD-10-PCS | Mod: CPTII,S$GLB,, | Performed by: PHYSICIAN ASSISTANT

## 2021-07-26 PROCEDURE — 1126F PR PAIN SEVERITY QUANTIFIED, NO PAIN PRESENT: ICD-10-PCS | Mod: CPTII,S$GLB,, | Performed by: PHYSICIAN ASSISTANT

## 2021-07-26 PROCEDURE — U0002 COVID-19 LAB TEST NON-CDC: HCPCS | Mod: QW,S$GLB,, | Performed by: PHYSICIAN ASSISTANT

## 2021-07-26 PROCEDURE — 3008F PR BODY MASS INDEX (BMI) DOCUMENTED: ICD-10-PCS | Mod: CPTII,S$GLB,, | Performed by: PHYSICIAN ASSISTANT

## 2021-07-26 PROCEDURE — 1159F MED LIST DOCD IN RCRD: CPT | Mod: CPTII,S$GLB,, | Performed by: PHYSICIAN ASSISTANT

## 2021-07-26 PROCEDURE — 99999 PR PBB SHADOW E&M-EST. PATIENT-LVL IV: CPT | Mod: PBBFAC,,, | Performed by: PHYSICIAN ASSISTANT

## 2021-07-26 PROCEDURE — 3077F PR MOST RECENT SYSTOLIC BLOOD PRESSURE >= 140 MM HG: ICD-10-PCS | Mod: CPTII,S$GLB,, | Performed by: PHYSICIAN ASSISTANT

## 2021-07-26 PROCEDURE — 3077F SYST BP >= 140 MM HG: CPT | Mod: CPTII,S$GLB,, | Performed by: PHYSICIAN ASSISTANT

## 2021-07-26 PROCEDURE — 99999 PR PBB SHADOW E&M-EST. PATIENT-LVL IV: ICD-10-PCS | Mod: PBBFAC,,, | Performed by: PHYSICIAN ASSISTANT

## 2021-07-26 PROCEDURE — 1126F AMNT PAIN NOTED NONE PRSNT: CPT | Mod: CPTII,S$GLB,, | Performed by: PHYSICIAN ASSISTANT

## 2021-07-26 PROCEDURE — 1159F PR MEDICATION LIST DOCUMENTED IN MEDICAL RECORD: ICD-10-PCS | Mod: CPTII,S$GLB,, | Performed by: PHYSICIAN ASSISTANT

## 2021-07-26 PROCEDURE — 99214 PR OFFICE/OUTPT VISIT, EST, LEVL IV, 30-39 MIN: ICD-10-PCS | Mod: S$GLB,CS,, | Performed by: PHYSICIAN ASSISTANT

## 2021-07-26 RX ORDER — NYSTATIN 100000 U/G
CREAM TOPICAL 2 TIMES DAILY
Qty: 15 G | Refills: 0 | Status: SHIPPED | OUTPATIENT
Start: 2021-07-26 | End: 2023-03-28

## 2021-08-19 ENCOUNTER — OFFICE VISIT (OUTPATIENT)
Dept: FAMILY MEDICINE | Facility: CLINIC | Age: 41
End: 2021-08-19
Payer: COMMERCIAL

## 2021-08-19 VITALS
OXYGEN SATURATION: 99 % | HEART RATE: 98 BPM | WEIGHT: 213.94 LBS | HEIGHT: 67 IN | TEMPERATURE: 98 F | BODY MASS INDEX: 33.58 KG/M2 | SYSTOLIC BLOOD PRESSURE: 128 MMHG | DIASTOLIC BLOOD PRESSURE: 88 MMHG

## 2021-08-19 DIAGNOSIS — R21 RASH: Primary | ICD-10-CM

## 2021-08-19 PROCEDURE — 1159F MED LIST DOCD IN RCRD: CPT | Mod: CPTII,S$GLB,, | Performed by: NURSE PRACTITIONER

## 2021-08-19 PROCEDURE — 99213 OFFICE O/P EST LOW 20 MIN: CPT | Mod: S$GLB,,, | Performed by: NURSE PRACTITIONER

## 2021-08-19 PROCEDURE — 3008F PR BODY MASS INDEX (BMI) DOCUMENTED: ICD-10-PCS | Mod: CPTII,S$GLB,, | Performed by: NURSE PRACTITIONER

## 2021-08-19 PROCEDURE — 3074F SYST BP LT 130 MM HG: CPT | Mod: CPTII,S$GLB,, | Performed by: NURSE PRACTITIONER

## 2021-08-19 PROCEDURE — 99999 PR PBB SHADOW E&M-EST. PATIENT-LVL IV: ICD-10-PCS | Mod: PBBFAC,,, | Performed by: NURSE PRACTITIONER

## 2021-08-19 PROCEDURE — 3074F PR MOST RECENT SYSTOLIC BLOOD PRESSURE < 130 MM HG: ICD-10-PCS | Mod: CPTII,S$GLB,, | Performed by: NURSE PRACTITIONER

## 2021-08-19 PROCEDURE — 3079F DIAST BP 80-89 MM HG: CPT | Mod: CPTII,S$GLB,, | Performed by: NURSE PRACTITIONER

## 2021-08-19 PROCEDURE — 99999 PR PBB SHADOW E&M-EST. PATIENT-LVL IV: CPT | Mod: PBBFAC,,, | Performed by: NURSE PRACTITIONER

## 2021-08-19 PROCEDURE — 99213 PR OFFICE/OUTPT VISIT, EST, LEVL III, 20-29 MIN: ICD-10-PCS | Mod: S$GLB,,, | Performed by: NURSE PRACTITIONER

## 2021-08-19 PROCEDURE — 3079F PR MOST RECENT DIASTOLIC BLOOD PRESSURE 80-89 MM HG: ICD-10-PCS | Mod: CPTII,S$GLB,, | Performed by: NURSE PRACTITIONER

## 2021-08-19 PROCEDURE — 3008F BODY MASS INDEX DOCD: CPT | Mod: CPTII,S$GLB,, | Performed by: NURSE PRACTITIONER

## 2021-08-19 PROCEDURE — 1159F PR MEDICATION LIST DOCUMENTED IN MEDICAL RECORD: ICD-10-PCS | Mod: CPTII,S$GLB,, | Performed by: NURSE PRACTITIONER

## 2021-08-19 RX ORDER — KETOCONAZOLE 20 MG/G
CREAM TOPICAL 3 TIMES DAILY
Qty: 30 G | Refills: 0 | Status: SHIPPED | OUTPATIENT
Start: 2021-08-19 | End: 2023-03-28

## 2021-08-23 ENCOUNTER — TELEPHONE (OUTPATIENT)
Dept: DERMATOLOGY | Facility: CLINIC | Age: 41
End: 2021-08-23

## 2021-08-25 ENCOUNTER — TELEPHONE (OUTPATIENT)
Dept: INTERNAL MEDICINE | Facility: CLINIC | Age: 41
End: 2021-08-25

## 2021-08-26 ENCOUNTER — TELEPHONE (OUTPATIENT)
Dept: INTERNAL MEDICINE | Facility: CLINIC | Age: 41
End: 2021-08-26

## 2021-08-26 ENCOUNTER — OFFICE VISIT (OUTPATIENT)
Dept: INTERNAL MEDICINE | Facility: CLINIC | Age: 41
End: 2021-08-26
Payer: COMMERCIAL

## 2021-08-26 VITALS
OXYGEN SATURATION: 98 % | HEIGHT: 67 IN | TEMPERATURE: 98 F | DIASTOLIC BLOOD PRESSURE: 72 MMHG | SYSTOLIC BLOOD PRESSURE: 120 MMHG | BODY MASS INDEX: 32.46 KG/M2 | WEIGHT: 206.81 LBS | HEART RATE: 105 BPM

## 2021-08-26 DIAGNOSIS — K64.9 HEMORRHOIDS, UNSPECIFIED HEMORRHOID TYPE: Primary | ICD-10-CM

## 2021-08-26 DIAGNOSIS — K62.89 RECTAL MASS: ICD-10-CM

## 2021-08-26 PROCEDURE — 99999 PR PBB SHADOW E&M-EST. PATIENT-LVL IV: CPT | Mod: PBBFAC,,, | Performed by: FAMILY MEDICINE

## 2021-08-26 PROCEDURE — 1125F PR PAIN SEVERITY QUANTIFIED, PAIN PRESENT: ICD-10-PCS | Mod: CPTII,S$GLB,, | Performed by: FAMILY MEDICINE

## 2021-08-26 PROCEDURE — 3074F PR MOST RECENT SYSTOLIC BLOOD PRESSURE < 130 MM HG: ICD-10-PCS | Mod: CPTII,S$GLB,, | Performed by: FAMILY MEDICINE

## 2021-08-26 PROCEDURE — 3078F DIAST BP <80 MM HG: CPT | Mod: CPTII,S$GLB,, | Performed by: FAMILY MEDICINE

## 2021-08-26 PROCEDURE — 3008F PR BODY MASS INDEX (BMI) DOCUMENTED: ICD-10-PCS | Mod: CPTII,S$GLB,, | Performed by: FAMILY MEDICINE

## 2021-08-26 PROCEDURE — 3074F SYST BP LT 130 MM HG: CPT | Mod: CPTII,S$GLB,, | Performed by: FAMILY MEDICINE

## 2021-08-26 PROCEDURE — 1159F MED LIST DOCD IN RCRD: CPT | Mod: CPTII,S$GLB,, | Performed by: FAMILY MEDICINE

## 2021-08-26 PROCEDURE — 1125F AMNT PAIN NOTED PAIN PRSNT: CPT | Mod: CPTII,S$GLB,, | Performed by: FAMILY MEDICINE

## 2021-08-26 PROCEDURE — 3008F BODY MASS INDEX DOCD: CPT | Mod: CPTII,S$GLB,, | Performed by: FAMILY MEDICINE

## 2021-08-26 PROCEDURE — 3078F PR MOST RECENT DIASTOLIC BLOOD PRESSURE < 80 MM HG: ICD-10-PCS | Mod: CPTII,S$GLB,, | Performed by: FAMILY MEDICINE

## 2021-08-26 PROCEDURE — 99213 PR OFFICE/OUTPT VISIT, EST, LEVL III, 20-29 MIN: ICD-10-PCS | Mod: S$GLB,,, | Performed by: FAMILY MEDICINE

## 2021-08-26 PROCEDURE — 1159F PR MEDICATION LIST DOCUMENTED IN MEDICAL RECORD: ICD-10-PCS | Mod: CPTII,S$GLB,, | Performed by: FAMILY MEDICINE

## 2021-08-26 PROCEDURE — 99999 PR PBB SHADOW E&M-EST. PATIENT-LVL IV: ICD-10-PCS | Mod: PBBFAC,,, | Performed by: FAMILY MEDICINE

## 2021-08-26 PROCEDURE — 99213 OFFICE O/P EST LOW 20 MIN: CPT | Mod: S$GLB,,, | Performed by: FAMILY MEDICINE

## 2021-08-26 RX ORDER — HYDROCORTISONE 25 MG/G
CREAM TOPICAL 2 TIMES DAILY
Qty: 28 G | Refills: 0 | Status: SHIPPED | OUTPATIENT
Start: 2021-08-26 | End: 2023-03-28

## 2021-08-26 RX ORDER — SULFAMETHOXAZOLE AND TRIMETHOPRIM 800; 160 MG/1; MG/1
1 TABLET ORAL 2 TIMES DAILY
Qty: 20 TABLET | Refills: 0 | Status: SHIPPED | OUTPATIENT
Start: 2021-08-26 | End: 2022-05-11

## 2021-08-31 ENCOUNTER — TELEPHONE (OUTPATIENT)
Dept: SURGERY | Facility: CLINIC | Age: 41
End: 2021-08-31

## 2021-09-07 ENCOUNTER — OFFICE VISIT (OUTPATIENT)
Dept: URGENT CARE | Facility: CLINIC | Age: 41
End: 2021-09-07
Payer: COMMERCIAL

## 2021-09-07 VITALS
BODY MASS INDEX: 31.87 KG/M2 | OXYGEN SATURATION: 98 % | WEIGHT: 203.5 LBS | TEMPERATURE: 98 F | DIASTOLIC BLOOD PRESSURE: 83 MMHG | HEART RATE: 88 BPM | SYSTOLIC BLOOD PRESSURE: 125 MMHG

## 2021-09-07 DIAGNOSIS — R05.9 COUGH: ICD-10-CM

## 2021-09-07 DIAGNOSIS — J02.9 SORE THROAT: ICD-10-CM

## 2021-09-07 DIAGNOSIS — J02.0 STREP PHARYNGITIS: Primary | ICD-10-CM

## 2021-09-07 LAB
CTP QC/QA: YES
CTP QC/QA: YES
MOLECULAR STREP A: NEGATIVE
SARS-COV-2 RDRP RESP QL NAA+PROBE: NEGATIVE

## 2021-09-07 PROCEDURE — U0002: ICD-10-PCS | Mod: QW,S$GLB,, | Performed by: PHYSICIAN ASSISTANT

## 2021-09-07 PROCEDURE — 1159F MED LIST DOCD IN RCRD: CPT | Mod: CPTII,S$GLB,, | Performed by: PHYSICIAN ASSISTANT

## 2021-09-07 PROCEDURE — 1159F PR MEDICATION LIST DOCUMENTED IN MEDICAL RECORD: ICD-10-PCS | Mod: CPTII,S$GLB,, | Performed by: PHYSICIAN ASSISTANT

## 2021-09-07 PROCEDURE — 87651 STREP A DNA AMP PROBE: CPT | Mod: QW,S$GLB,, | Performed by: PHYSICIAN ASSISTANT

## 2021-09-07 PROCEDURE — 1160F RVW MEDS BY RX/DR IN RCRD: CPT | Mod: CPTII,S$GLB,, | Performed by: PHYSICIAN ASSISTANT

## 2021-09-07 PROCEDURE — 99999 PR PBB SHADOW E&M-EST. PATIENT-LVL IV: CPT | Mod: PBBFAC,,, | Performed by: PHYSICIAN ASSISTANT

## 2021-09-07 PROCEDURE — 87651 POCT STREP A MOLECULAR: ICD-10-PCS | Mod: QW,S$GLB,, | Performed by: PHYSICIAN ASSISTANT

## 2021-09-07 PROCEDURE — 99214 PR OFFICE/OUTPT VISIT, EST, LEVL IV, 30-39 MIN: ICD-10-PCS | Mod: S$GLB,CS,, | Performed by: PHYSICIAN ASSISTANT

## 2021-09-07 PROCEDURE — 3079F PR MOST RECENT DIASTOLIC BLOOD PRESSURE 80-89 MM HG: ICD-10-PCS | Mod: CPTII,S$GLB,, | Performed by: PHYSICIAN ASSISTANT

## 2021-09-07 PROCEDURE — 1160F PR REVIEW ALL MEDS BY PRESCRIBER/CLIN PHARMACIST DOCUMENTED: ICD-10-PCS | Mod: CPTII,S$GLB,, | Performed by: PHYSICIAN ASSISTANT

## 2021-09-07 PROCEDURE — 99999 PR PBB SHADOW E&M-EST. PATIENT-LVL IV: ICD-10-PCS | Mod: PBBFAC,,, | Performed by: PHYSICIAN ASSISTANT

## 2021-09-07 PROCEDURE — U0002 COVID-19 LAB TEST NON-CDC: HCPCS | Mod: QW,S$GLB,, | Performed by: PHYSICIAN ASSISTANT

## 2021-09-07 PROCEDURE — 3074F SYST BP LT 130 MM HG: CPT | Mod: CPTII,S$GLB,, | Performed by: PHYSICIAN ASSISTANT

## 2021-09-07 PROCEDURE — 3074F PR MOST RECENT SYSTOLIC BLOOD PRESSURE < 130 MM HG: ICD-10-PCS | Mod: CPTII,S$GLB,, | Performed by: PHYSICIAN ASSISTANT

## 2021-09-07 PROCEDURE — 99214 OFFICE O/P EST MOD 30 MIN: CPT | Mod: S$GLB,CS,, | Performed by: PHYSICIAN ASSISTANT

## 2021-09-07 PROCEDURE — 3008F PR BODY MASS INDEX (BMI) DOCUMENTED: ICD-10-PCS | Mod: CPTII,S$GLB,, | Performed by: PHYSICIAN ASSISTANT

## 2021-09-07 PROCEDURE — 3079F DIAST BP 80-89 MM HG: CPT | Mod: CPTII,S$GLB,, | Performed by: PHYSICIAN ASSISTANT

## 2021-09-07 PROCEDURE — 3008F BODY MASS INDEX DOCD: CPT | Mod: CPTII,S$GLB,, | Performed by: PHYSICIAN ASSISTANT

## 2021-09-07 RX ORDER — PENICILLIN V POTASSIUM 500 MG/1
500 TABLET, FILM COATED ORAL 4 TIMES DAILY
Qty: 28 TABLET | Refills: 0 | Status: SHIPPED | OUTPATIENT
Start: 2021-09-07 | End: 2021-09-14

## 2021-09-10 ENCOUNTER — PATIENT MESSAGE (OUTPATIENT)
Dept: INTERNAL MEDICINE | Facility: CLINIC | Age: 41
End: 2021-09-10

## 2021-12-22 ENCOUNTER — OFFICE VISIT (OUTPATIENT)
Dept: URGENT CARE | Facility: CLINIC | Age: 41
End: 2021-12-22
Payer: COMMERCIAL

## 2021-12-22 ENCOUNTER — TELEPHONE (OUTPATIENT)
Dept: INTERNAL MEDICINE | Facility: CLINIC | Age: 41
End: 2021-12-22
Payer: COMMERCIAL

## 2021-12-22 ENCOUNTER — PATIENT MESSAGE (OUTPATIENT)
Dept: INTERNAL MEDICINE | Facility: CLINIC | Age: 41
End: 2021-12-22
Payer: COMMERCIAL

## 2021-12-22 VITALS
RESPIRATION RATE: 18 BRPM | SYSTOLIC BLOOD PRESSURE: 129 MMHG | TEMPERATURE: 98 F | WEIGHT: 203 LBS | HEIGHT: 67 IN | BODY MASS INDEX: 31.86 KG/M2 | HEART RATE: 87 BPM | OXYGEN SATURATION: 100 % | DIASTOLIC BLOOD PRESSURE: 77 MMHG

## 2021-12-22 DIAGNOSIS — R11.0 NAUSEA: ICD-10-CM

## 2021-12-22 DIAGNOSIS — J03.90 TONSILLITIS: ICD-10-CM

## 2021-12-22 DIAGNOSIS — J11.1 INFLUENZA: Primary | ICD-10-CM

## 2021-12-22 DIAGNOSIS — R05.9 COUGH: ICD-10-CM

## 2021-12-22 DIAGNOSIS — J02.9 PHARYNGITIS, UNSPECIFIED ETIOLOGY: ICD-10-CM

## 2021-12-22 DIAGNOSIS — I10 ESSENTIAL HYPERTENSION: ICD-10-CM

## 2021-12-22 LAB
CTP QC/QA: YES
POC MOLECULAR INFLUENZA A AGN: POSITIVE
POC MOLECULAR INFLUENZA B AGN: NEGATIVE

## 2021-12-22 PROCEDURE — 1159F PR MEDICATION LIST DOCUMENTED IN MEDICAL RECORD: ICD-10-PCS | Mod: CPTII,S$GLB,, | Performed by: PHYSICIAN ASSISTANT

## 2021-12-22 PROCEDURE — 1160F PR REVIEW ALL MEDS BY PRESCRIBER/CLIN PHARMACIST DOCUMENTED: ICD-10-PCS | Mod: CPTII,S$GLB,, | Performed by: PHYSICIAN ASSISTANT

## 2021-12-22 PROCEDURE — 87502 POCT INFLUENZA A/B MOLECULAR: ICD-10-PCS | Mod: QW,S$GLB,, | Performed by: PHYSICIAN ASSISTANT

## 2021-12-22 PROCEDURE — 3008F BODY MASS INDEX DOCD: CPT | Mod: CPTII,S$GLB,, | Performed by: PHYSICIAN ASSISTANT

## 2021-12-22 PROCEDURE — 99213 PR OFFICE/OUTPT VISIT, EST, LEVL III, 20-29 MIN: ICD-10-PCS | Mod: S$GLB,,, | Performed by: PHYSICIAN ASSISTANT

## 2021-12-22 PROCEDURE — 1160F RVW MEDS BY RX/DR IN RCRD: CPT | Mod: CPTII,S$GLB,, | Performed by: PHYSICIAN ASSISTANT

## 2021-12-22 PROCEDURE — 1159F MED LIST DOCD IN RCRD: CPT | Mod: CPTII,S$GLB,, | Performed by: PHYSICIAN ASSISTANT

## 2021-12-22 PROCEDURE — 3078F DIAST BP <80 MM HG: CPT | Mod: CPTII,S$GLB,, | Performed by: PHYSICIAN ASSISTANT

## 2021-12-22 PROCEDURE — 99213 OFFICE O/P EST LOW 20 MIN: CPT | Mod: S$GLB,,, | Performed by: PHYSICIAN ASSISTANT

## 2021-12-22 PROCEDURE — 3008F PR BODY MASS INDEX (BMI) DOCUMENTED: ICD-10-PCS | Mod: CPTII,S$GLB,, | Performed by: PHYSICIAN ASSISTANT

## 2021-12-22 PROCEDURE — 3078F PR MOST RECENT DIASTOLIC BLOOD PRESSURE < 80 MM HG: ICD-10-PCS | Mod: CPTII,S$GLB,, | Performed by: PHYSICIAN ASSISTANT

## 2021-12-22 PROCEDURE — 3074F SYST BP LT 130 MM HG: CPT | Mod: CPTII,S$GLB,, | Performed by: PHYSICIAN ASSISTANT

## 2021-12-22 PROCEDURE — 3074F PR MOST RECENT SYSTOLIC BLOOD PRESSURE < 130 MM HG: ICD-10-PCS | Mod: CPTII,S$GLB,, | Performed by: PHYSICIAN ASSISTANT

## 2021-12-22 PROCEDURE — 87502 INFLUENZA DNA AMP PROBE: CPT | Mod: QW,S$GLB,, | Performed by: PHYSICIAN ASSISTANT

## 2021-12-22 RX ORDER — ONDANSETRON 4 MG/1
8 TABLET, ORALLY DISINTEGRATING ORAL EVERY 6 HOURS PRN
Qty: 20 TABLET | Refills: 0 | Status: SHIPPED | OUTPATIENT
Start: 2021-12-22

## 2021-12-23 RX ORDER — BENZONATATE 100 MG/1
200 CAPSULE ORAL 3 TIMES DAILY PRN
Qty: 60 CAPSULE | Refills: 1 | Status: SHIPPED | OUTPATIENT
Start: 2021-12-23 | End: 2022-05-11

## 2021-12-23 RX ORDER — OSELTAMIVIR PHOSPHATE 75 MG/1
75 CAPSULE ORAL 2 TIMES DAILY
Qty: 10 CAPSULE | Refills: 0 | Status: SHIPPED | OUTPATIENT
Start: 2021-12-23 | End: 2021-12-28

## 2021-12-28 ENCOUNTER — OFFICE VISIT (OUTPATIENT)
Dept: INTERNAL MEDICINE | Facility: CLINIC | Age: 41
End: 2021-12-28
Payer: COMMERCIAL

## 2021-12-28 DIAGNOSIS — J06.9 UPPER RESPIRATORY INFECTION WITH COUGH AND CONGESTION: Primary | ICD-10-CM

## 2021-12-28 PROCEDURE — 1160F RVW MEDS BY RX/DR IN RCRD: CPT | Mod: CPTII,95,,

## 2021-12-28 PROCEDURE — 99213 PR OFFICE/OUTPT VISIT, EST, LEVL III, 20-29 MIN: ICD-10-PCS | Mod: 95,,,

## 2021-12-28 PROCEDURE — 1160F PR REVIEW ALL MEDS BY PRESCRIBER/CLIN PHARMACIST DOCUMENTED: ICD-10-PCS | Mod: CPTII,95,,

## 2021-12-28 PROCEDURE — 1159F MED LIST DOCD IN RCRD: CPT | Mod: CPTII,95,,

## 2021-12-28 PROCEDURE — 1159F PR MEDICATION LIST DOCUMENTED IN MEDICAL RECORD: ICD-10-PCS | Mod: CPTII,95,,

## 2021-12-28 PROCEDURE — 99213 OFFICE O/P EST LOW 20 MIN: CPT | Mod: 95,,,

## 2021-12-28 RX ORDER — ALBUTEROL SULFATE 90 UG/1
2 AEROSOL, METERED RESPIRATORY (INHALATION) EVERY 6 HOURS PRN
Qty: 18 G | Refills: 0 | Status: SHIPPED | OUTPATIENT
Start: 2021-12-28 | End: 2022-12-28

## 2021-12-28 RX ORDER — PROMETHAZINE HYDROCHLORIDE AND PHENYLEPHRINE HYDROCHLORIDE 6.25; 5 MG/5ML; MG/5ML
5 SYRUP ORAL EVERY 6 HOURS PRN
Qty: 118 ML | Refills: 0 | Status: SHIPPED | OUTPATIENT
Start: 2021-12-28 | End: 2022-01-07

## 2021-12-29 ENCOUNTER — CLINICAL SUPPORT (OUTPATIENT)
Dept: INTERNAL MEDICINE | Facility: CLINIC | Age: 41
End: 2021-12-29
Payer: COMMERCIAL

## 2021-12-29 DIAGNOSIS — J06.9 UPPER RESPIRATORY INFECTION WITH COUGH AND CONGESTION: ICD-10-CM

## 2021-12-29 DIAGNOSIS — Z20.822 COVID-19 RULED OUT: Primary | ICD-10-CM

## 2021-12-29 LAB
CTP QC/QA: YES
SARS-COV-2 RDRP RESP QL NAA+PROBE: NEGATIVE

## 2021-12-29 PROCEDURE — U0002 COVID-19 LAB TEST NON-CDC: HCPCS | Mod: QW,S$GLB,,

## 2021-12-29 PROCEDURE — 99999 PR PBB SHADOW E&M-EST. PATIENT-LVL III: CPT | Mod: PBBFAC,,,

## 2021-12-29 PROCEDURE — U0002: ICD-10-PCS | Mod: QW,S$GLB,,

## 2021-12-29 PROCEDURE — 99999 PR PBB SHADOW E&M-EST. PATIENT-LVL III: ICD-10-PCS | Mod: PBBFAC,,,

## 2021-12-29 RX ORDER — METHYLPREDNISOLONE 4 MG/1
TABLET ORAL
Qty: 21 EACH | Refills: 0 | Status: SHIPPED | OUTPATIENT
Start: 2021-12-29 | End: 2022-01-19

## 2022-01-26 DIAGNOSIS — Z12.31 OTHER SCREENING MAMMOGRAM: ICD-10-CM

## 2022-03-25 ENCOUNTER — PATIENT OUTREACH (OUTPATIENT)
Dept: ADMINISTRATIVE | Facility: HOSPITAL | Age: 42
End: 2022-03-25
Payer: COMMERCIAL

## 2022-03-25 NOTE — PROGRESS NOTES
BR PAP REPORT: Chart was reviewed for overdue pap smear. Called and spoke with pt offered to schedule pap smear pt says she will schedule pap smear outside of [facility] and will have records faxed over. Pt wanted to schedule mammo at [facility] on 04/20/2022

## 2022-05-02 ENCOUNTER — PATIENT MESSAGE (OUTPATIENT)
Dept: ADMINISTRATIVE | Facility: HOSPITAL | Age: 42
End: 2022-05-02
Payer: COMMERCIAL

## 2022-05-11 ENCOUNTER — OFFICE VISIT (OUTPATIENT)
Dept: INTERNAL MEDICINE | Facility: CLINIC | Age: 42
End: 2022-05-11
Payer: COMMERCIAL

## 2022-05-11 VITALS
HEIGHT: 67 IN | TEMPERATURE: 99 F | DIASTOLIC BLOOD PRESSURE: 84 MMHG | OXYGEN SATURATION: 98 % | HEART RATE: 86 BPM | BODY MASS INDEX: 30.79 KG/M2 | WEIGHT: 196.19 LBS | SYSTOLIC BLOOD PRESSURE: 110 MMHG

## 2022-05-11 DIAGNOSIS — R30.0 DYSURIA: Primary | ICD-10-CM

## 2022-05-11 LAB
BACTERIA #/AREA URNS HPF: ABNORMAL /HPF
BILIRUB UR QL STRIP: NEGATIVE
CLARITY UR: ABNORMAL
COLOR UR: YELLOW
GLUCOSE UR QL STRIP: NEGATIVE
HGB UR QL STRIP: ABNORMAL
KETONES UR QL STRIP: NEGATIVE
LEUKOCYTE ESTERASE UR QL STRIP: ABNORMAL
MICROSCOPIC COMMENT: ABNORMAL
NITRITE UR QL STRIP: NEGATIVE
PH UR STRIP: 6 [PH] (ref 5–8)
PROT UR QL STRIP: NEGATIVE
RBC #/AREA URNS HPF: 10 /HPF (ref 0–4)
SP GR UR STRIP: 1.02 (ref 1–1.03)
SQUAMOUS #/AREA URNS HPF: 11 /HPF
TRICHOMONAS UR QL MICRO: ABNORMAL
URN SPEC COLLECT METH UR: ABNORMAL
WBC #/AREA URNS HPF: 14 /HPF (ref 0–5)
WBC CLUMPS URNS QL MICRO: ABNORMAL

## 2022-05-11 PROCEDURE — 1160F PR REVIEW ALL MEDS BY PRESCRIBER/CLIN PHARMACIST DOCUMENTED: ICD-10-PCS | Mod: CPTII,S$GLB,, | Performed by: FAMILY MEDICINE

## 2022-05-11 PROCEDURE — 87086 URINE CULTURE/COLONY COUNT: CPT | Performed by: FAMILY MEDICINE

## 2022-05-11 PROCEDURE — 99213 PR OFFICE/OUTPT VISIT, EST, LEVL III, 20-29 MIN: ICD-10-PCS | Mod: S$GLB,,, | Performed by: FAMILY MEDICINE

## 2022-05-11 PROCEDURE — 99999 PR PBB SHADOW E&M-EST. PATIENT-LVL IV: CPT | Mod: PBBFAC,,, | Performed by: FAMILY MEDICINE

## 2022-05-11 PROCEDURE — 3079F PR MOST RECENT DIASTOLIC BLOOD PRESSURE 80-89 MM HG: ICD-10-PCS | Mod: CPTII,S$GLB,, | Performed by: FAMILY MEDICINE

## 2022-05-11 PROCEDURE — 3008F BODY MASS INDEX DOCD: CPT | Mod: CPTII,S$GLB,, | Performed by: FAMILY MEDICINE

## 2022-05-11 PROCEDURE — 3074F SYST BP LT 130 MM HG: CPT | Mod: CPTII,S$GLB,, | Performed by: FAMILY MEDICINE

## 2022-05-11 PROCEDURE — 1159F MED LIST DOCD IN RCRD: CPT | Mod: CPTII,S$GLB,, | Performed by: FAMILY MEDICINE

## 2022-05-11 PROCEDURE — 3079F DIAST BP 80-89 MM HG: CPT | Mod: CPTII,S$GLB,, | Performed by: FAMILY MEDICINE

## 2022-05-11 PROCEDURE — 3074F PR MOST RECENT SYSTOLIC BLOOD PRESSURE < 130 MM HG: ICD-10-PCS | Mod: CPTII,S$GLB,, | Performed by: FAMILY MEDICINE

## 2022-05-11 PROCEDURE — 1159F PR MEDICATION LIST DOCUMENTED IN MEDICAL RECORD: ICD-10-PCS | Mod: CPTII,S$GLB,, | Performed by: FAMILY MEDICINE

## 2022-05-11 PROCEDURE — 3008F PR BODY MASS INDEX (BMI) DOCUMENTED: ICD-10-PCS | Mod: CPTII,S$GLB,, | Performed by: FAMILY MEDICINE

## 2022-05-11 PROCEDURE — 99999 PR PBB SHADOW E&M-EST. PATIENT-LVL IV: ICD-10-PCS | Mod: PBBFAC,,, | Performed by: FAMILY MEDICINE

## 2022-05-11 PROCEDURE — 99213 OFFICE O/P EST LOW 20 MIN: CPT | Mod: S$GLB,,, | Performed by: FAMILY MEDICINE

## 2022-05-11 PROCEDURE — 1160F RVW MEDS BY RX/DR IN RCRD: CPT | Mod: CPTII,S$GLB,, | Performed by: FAMILY MEDICINE

## 2022-05-11 PROCEDURE — 81000 URINALYSIS NONAUTO W/SCOPE: CPT | Performed by: FAMILY MEDICINE

## 2022-05-11 RX ORDER — DIETHYLPROPION HYDROCHLORIDE 75 MG/1
75 TABLET, EXTENDED RELEASE ORAL DAILY
COMMUNITY
Start: 2022-04-07

## 2022-05-11 RX ORDER — CIPROFLOXACIN 500 MG/1
500 TABLET ORAL 2 TIMES DAILY
Qty: 6 TABLET | Refills: 0 | Status: SHIPPED | OUTPATIENT
Start: 2022-05-11 | End: 2022-05-14

## 2022-05-11 NOTE — PROGRESS NOTES
Judie Munoz  05/11/2022  [id number]    Ritika Tong MD  Patient Care Team:  Ritika Tong MD as PCP - General (Family Medicine)  Lilibeth Callejas LPN as Care Coordinator (Internal Medicine)          Visit Type:a scheduled routine follow-up visit    Chief Complaint: Pelvic Pressure    History of Present Illness:  Urgent visit  UTI  She travels a lot.   Ride in car. Uses shapewear/Sweating  Not drinking enough water    Now having suprapubic discomfort.  No fever  No blood  1 weeks    Answers for HPI/ROS submitted by the patient on 5/11/2022  activity change: No  unexpected weight change: No  rhinorrhea: No  trouble swallowing: No  visual disturbance: No  chest tightness: No  polyuria: No  difficulty urinating: No  menstrual problem: No  joint swelling: No  arthralgias: No  confusion: No  dysphoric mood: No        History:  Past Medical History:   Diagnosis Date    Hypertension      Past Surgical History:   Procedure Laterality Date    D&C x 3       miscarriages     Family History   Problem Relation Age of Onset    Heart disease Mother         a fib    Hypertension Mother     Diabetes Father     Cancer Maternal Aunt     Diabetes Paternal Aunt     Diabetes Maternal Grandmother     Heart disease Maternal Grandmother     Kidney disease Maternal Grandmother      Social History     Socioeconomic History    Marital status: Single    Number of children: 2   Occupational History     Employer: [employer] AND [employer]   Tobacco Use    Smoking status: Never Smoker    Smokeless tobacco: Never Used   Substance and Sexual Activity    Alcohol use: No     Alcohol/week: 0.0 standard drinks    Drug use: No    Sexual activity: Not Currently     Partners: Male     Social Determinants of Health     Financial Resource Strain: Medium Risk    Difficulty of Paying Living Expenses: Somewhat hard   Food Insecurity: No Food Insecurity    Worried About Running Out of Food in the Last Year: Never true    Ran Out  of Food in the Last Year: Never true   Transportation Needs: No Transportation Needs    Lack of Transportation (Medical): No    Lack of Transportation (Non-Medical): No   Physical Activity: Insufficiently Active    Days of Exercise per Week: 3 days    Minutes of Exercise per Session: 40 min   Stress: No Stress Concern Present    Feeling of Stress : Only a little   Social Connections: Unknown    Frequency of Communication with Friends and Family: Three times a week    Frequency of Social Gatherings with Friends and Family: Twice a week    Active Member of Clubs or Organizations: Yes    Attends Club or Organization Meetings: 1 to 4 times per year    Marital Status: Never    Housing Stability: Low Risk     Unable to Pay for Housing in the Last Year: No    Number of Places Lived in the Last Year: 1    Unstable Housing in the Last Year: No     Patient Active Problem List   Diagnosis    Hypertension    Migraines    Thyroid nodule     Review of patient's allergies indicates:  No Known Allergies    The following were reviewed at this visit: active problem list, medication list, allergies, family history, social history, and health maintenance.    Medications:  Current Outpatient Medications on File Prior to Visit   Medication Sig Dispense Refill    amLODIPine (NORVASC) 5 MG tablet Take 1 tablet (5 mg total) by mouth once daily. 90 tablet 0    butalbital-acetaminophen-caffeine -40 mg (FIORICET, ESGIC) -40 mg per tablet Take 1 tablet by mouth every 8 (eight) hours as needed for Headaches. 30 tablet 1    diethylpropion (TENUATE) 75 mg SR tablet Take 75 mg by mouth once daily.      hydroCHLOROthiazide (HYDRODIURIL) 25 MG tablet Take 1 tablet (25 mg total) by mouth once daily. 90 tablet 0    levonorgestrel (MIRENA IU) by Intrauterine route.      ondansetron (ZOFRAN-ODT) 4 MG TbDL Take 2 tablets (8 mg total) by mouth every 6 (six) hours as needed (nausea). 20 tablet 0    ZOLMitriptan  (ZOMIG-ZMT) 5 MG disintegrating tablet Take 1 tablet (5 mg total) by mouth as needed for Migraine. 18 tablet 1    albuterol (PROVENTIL HFA) 90 mcg/actuation inhaler Inhale 2 puffs into the lungs every 6 (six) hours as needed for Wheezing. Rescue (Patient not taking: Reported on 5/11/2022) 18 g 0    hydrocortisone (ANUSOL-HC) 2.5 % rectal cream Place rectally 2 (two) times daily. (Patient not taking: Reported on 5/11/2022) 28 g 0    ketoconazole (NIZORAL) 2 % cream Apply topically 3 (three) times daily. (Patient not taking: Reported on 5/11/2022) 30 g 0    nystatin (MYCOSTATIN) cream Apply topically 2 (two) times daily. (Patient not taking: Reported on 5/11/2022) 15 g 0    phentermine (ADIPEX-P) 37.5 mg tablet Take 37.5 mg by mouth before breakfast. Patient taking half      topiramate (TOPAMAX) 25 MG tablet Take 1 tablet (25 mg total) by mouth 2 (two) times daily. 60 tablet 11    [DISCONTINUED] benzonatate (TESSALON PERLES) 100 MG capsule Take 2 capsules (200 mg total) by mouth 3 (three) times daily as needed for Cough. (Patient not taking: Reported on 5/11/2022) 60 capsule 1    [DISCONTINUED] sulfamethoxazole-trimethoprim 800-160mg (BACTRIM DS) 800-160 mg Tab Take 1 tablet by mouth 2 (two) times daily. (Patient not taking: Reported on 5/11/2022) 20 tablet 0     No current facility-administered medications on file prior to visit.       Medications have been reviewed and reconciled with patient at this visit.  Barriers to medications reviewed with patient.    Adverse reactions to current medications reviewed with patient..    Over the counter medications reviewed and reconciled with patient.    Exam:  Wt Readings from Last 3 Encounters:   05/11/22 89 kg (196 lb 3.4 oz)   12/22/21 92.1 kg (203 lb)   09/07/21 92.3 kg (203 lb 7.8 oz)     Temp Readings from Last 3 Encounters:   05/11/22 99 °F (37.2 °C) (Temporal)   12/22/21 98.1 °F (36.7 °C) (Tympanic)   09/07/21 98.3 °F (36.8 °C) (Oral)     BP Readings from Last  3 Encounters:   05/11/22 110/84   12/22/21 129/77   09/07/21 125/83     Pulse Readings from Last 3 Encounters:   05/11/22 86   12/22/21 87   09/07/21 88     Body mass index is 30.73 kg/m².      Review of Systems   HENT: Negative for hearing loss.    Eyes: Negative for discharge.   Respiratory: Negative for wheezing.    Cardiovascular: Negative for chest pain and palpitations.   Gastrointestinal: Negative for blood in stool, constipation, diarrhea and vomiting.   Genitourinary: Positive for dysuria and urgency. Negative for hematuria.   Musculoskeletal: Negative for neck pain.   Neurological: Negative for weakness and headaches.   Endo/Heme/Allergies: Negative for polydipsia.     Physical Exam  Nursing note reviewed.   Cardiovascular:      Rate and Rhythm: Normal rate and regular rhythm.   Pulmonary:      Effort: Pulmonary effort is normal. No respiratory distress.   Neurological:      Mental Status: She is alert and oriented to person, place, and time.   Psychiatric:         Mood and Affect: Mood normal.         Behavior: Behavior normal.         Thought Content: Thought content normal.         Judgment: Judgment normal.         Laboratory Reviewed ({Yes)  Lab Results   Component Value Date    WBC 9.38 04/24/2017    HGB 13.1 04/24/2017    HCT 40.5 04/24/2017     04/24/2017    CHOL 164 02/03/2020    TRIG 57 02/03/2020    HDL 45 02/03/2020    ALT 15 02/03/2020    AST 19 02/03/2020     02/03/2020    K 3.3 (L) 02/03/2020     02/03/2020    CREATININE 0.8 02/03/2020    BUN 10 02/03/2020    CO2 29 02/03/2020    TSH 1.099 04/24/2017    HGBA1C 5.8 (H) 07/22/2020       Judie was seen today for urinary tract infection.    Diagnoses and all orders for this visit:    Dysuria  -     Urinalysis, Reflex to Urine Culture Urine, Clean Catch; Future  -     Urinalysis, Reflex to Urine Culture Urine, Clean Catch    Other orders  -     ciprofloxacin HCl (CIPRO) 500 MG tablet; Take 1 tablet (500 mg total) by mouth 2  (two) times daily. for 3 days                Care Plan/Goals: Reviewed    Goals        Patient Stated      Blood Pressure < 130/80 (pt-stated)        Other      Exercise at least 150 minutes per week.       Keep up the great work with getting 30 min of exercise most days per week!           Maintain a low sodium diet       Aim for less than 2000 mg sodium per day and look for items with less than 140 mg sodium per serving          Take at least one BP reading per week at various times of the day           Follow up: No follow-ups on file.    After visit summary was printed and given to patient upon discharge today.  Patient goals and care plan are included in After Visit Summary.

## 2022-05-13 LAB
BACTERIA UR CULT: ABNORMAL
BACTERIA UR CULT: ABNORMAL

## 2022-05-17 ENCOUNTER — PATIENT MESSAGE (OUTPATIENT)
Dept: INTERNAL MEDICINE | Facility: CLINIC | Age: 42
End: 2022-05-17
Payer: COMMERCIAL

## 2022-05-17 DIAGNOSIS — R30.0 DYSURIA: Primary | ICD-10-CM

## 2022-05-17 NOTE — TELEPHONE ENCOUNTER
Hi Dr Tong:  I completed the Cipro but I still have the same symptoms. Is there another medication I can try, or repeat the cipro?

## 2022-05-18 ENCOUNTER — PATIENT MESSAGE (OUTPATIENT)
Dept: INTERNAL MEDICINE | Facility: CLINIC | Age: 42
End: 2022-05-18
Payer: COMMERCIAL

## 2022-05-18 ENCOUNTER — LAB VISIT (OUTPATIENT)
Dept: LAB | Facility: HOSPITAL | Age: 42
End: 2022-05-18
Attending: FAMILY MEDICINE
Payer: COMMERCIAL

## 2022-05-18 DIAGNOSIS — R30.0 DYSURIA: ICD-10-CM

## 2022-05-18 LAB
BACTERIA #/AREA URNS HPF: ABNORMAL /HPF
BILIRUB UR QL STRIP: NEGATIVE
CLARITY UR: ABNORMAL
COLOR UR: YELLOW
GLUCOSE UR QL STRIP: NEGATIVE
HGB UR QL STRIP: ABNORMAL
KETONES UR QL STRIP: NEGATIVE
LEUKOCYTE ESTERASE UR QL STRIP: ABNORMAL
MICROSCOPIC COMMENT: ABNORMAL
NITRITE UR QL STRIP: NEGATIVE
PH UR STRIP: 7 [PH] (ref 5–8)
PROT UR QL STRIP: NEGATIVE
RBC #/AREA URNS HPF: 2 /HPF (ref 0–4)
SP GR UR STRIP: 1.01 (ref 1–1.03)
SQUAMOUS #/AREA URNS HPF: 10 /HPF
TRICHOMONAS UR QL MICRO: ABNORMAL
URN SPEC COLLECT METH UR: ABNORMAL
WBC #/AREA URNS HPF: 10 /HPF (ref 0–5)

## 2022-05-18 PROCEDURE — 81000 URINALYSIS NONAUTO W/SCOPE: CPT | Performed by: FAMILY MEDICINE

## 2022-05-19 ENCOUNTER — PATIENT MESSAGE (OUTPATIENT)
Dept: INTERNAL MEDICINE | Facility: CLINIC | Age: 42
End: 2022-05-19
Payer: COMMERCIAL

## 2022-05-19 RX ORDER — METRONIDAZOLE 500 MG/1
500 TABLET ORAL EVERY 12 HOURS
Qty: 14 TABLET | Refills: 0 | Status: SHIPPED | OUTPATIENT
Start: 2022-05-19 | End: 2022-05-26

## 2022-07-10 ENCOUNTER — OFFICE VISIT (OUTPATIENT)
Dept: URGENT CARE | Facility: CLINIC | Age: 42
End: 2022-07-10
Payer: COMMERCIAL

## 2022-07-10 VITALS
WEIGHT: 197 LBS | RESPIRATION RATE: 17 BRPM | HEART RATE: 69 BPM | SYSTOLIC BLOOD PRESSURE: 136 MMHG | DIASTOLIC BLOOD PRESSURE: 86 MMHG | HEIGHT: 67 IN | BODY MASS INDEX: 30.92 KG/M2 | TEMPERATURE: 97 F | OXYGEN SATURATION: 100 %

## 2022-07-10 DIAGNOSIS — Z20.822 CLOSE EXPOSURE TO COVID-19 VIRUS: ICD-10-CM

## 2022-07-10 DIAGNOSIS — R68.89 FLU-LIKE SYMPTOMS: ICD-10-CM

## 2022-07-10 DIAGNOSIS — R19.7 DIARRHEA, UNSPECIFIED TYPE: ICD-10-CM

## 2022-07-10 DIAGNOSIS — J02.9 SORE THROAT: Primary | ICD-10-CM

## 2022-07-10 DIAGNOSIS — R11.0 NAUSEA: ICD-10-CM

## 2022-07-10 LAB
CTP QC/QA: YES
SARS-COV-2 RDRP RESP QL NAA+PROBE: NEGATIVE

## 2022-07-10 PROCEDURE — 3008F PR BODY MASS INDEX (BMI) DOCUMENTED: ICD-10-PCS | Mod: CPTII,S$GLB,, | Performed by: NURSE PRACTITIONER

## 2022-07-10 PROCEDURE — 1160F RVW MEDS BY RX/DR IN RCRD: CPT | Mod: CPTII,S$GLB,, | Performed by: NURSE PRACTITIONER

## 2022-07-10 PROCEDURE — 3008F BODY MASS INDEX DOCD: CPT | Mod: CPTII,S$GLB,, | Performed by: NURSE PRACTITIONER

## 2022-07-10 PROCEDURE — 3075F SYST BP GE 130 - 139MM HG: CPT | Mod: CPTII,S$GLB,, | Performed by: NURSE PRACTITIONER

## 2022-07-10 PROCEDURE — 1159F PR MEDICATION LIST DOCUMENTED IN MEDICAL RECORD: ICD-10-PCS | Mod: CPTII,S$GLB,, | Performed by: NURSE PRACTITIONER

## 2022-07-10 PROCEDURE — 99214 PR OFFICE/OUTPT VISIT, EST, LEVL IV, 30-39 MIN: ICD-10-PCS | Mod: S$GLB,,, | Performed by: NURSE PRACTITIONER

## 2022-07-10 PROCEDURE — 3075F PR MOST RECENT SYSTOLIC BLOOD PRESS GE 130-139MM HG: ICD-10-PCS | Mod: CPTII,S$GLB,, | Performed by: NURSE PRACTITIONER

## 2022-07-10 PROCEDURE — U0002 COVID-19 LAB TEST NON-CDC: HCPCS | Mod: QW,S$GLB,, | Performed by: NURSE PRACTITIONER

## 2022-07-10 PROCEDURE — 1159F MED LIST DOCD IN RCRD: CPT | Mod: CPTII,S$GLB,, | Performed by: NURSE PRACTITIONER

## 2022-07-10 PROCEDURE — U0002: ICD-10-PCS | Mod: QW,S$GLB,, | Performed by: NURSE PRACTITIONER

## 2022-07-10 PROCEDURE — 1160F PR REVIEW ALL MEDS BY PRESCRIBER/CLIN PHARMACIST DOCUMENTED: ICD-10-PCS | Mod: CPTII,S$GLB,, | Performed by: NURSE PRACTITIONER

## 2022-07-10 PROCEDURE — 99214 OFFICE O/P EST MOD 30 MIN: CPT | Mod: S$GLB,,, | Performed by: NURSE PRACTITIONER

## 2022-07-10 PROCEDURE — 3079F DIAST BP 80-89 MM HG: CPT | Mod: CPTII,S$GLB,, | Performed by: NURSE PRACTITIONER

## 2022-07-10 PROCEDURE — 3079F PR MOST RECENT DIASTOLIC BLOOD PRESSURE 80-89 MM HG: ICD-10-PCS | Mod: CPTII,S$GLB,, | Performed by: NURSE PRACTITIONER

## 2022-07-10 RX ORDER — FLUTICASONE PROPIONATE 50 MCG
2 SPRAY, SUSPENSION (ML) NASAL DAILY
Qty: 16 G | Refills: 1 | Status: SHIPPED | OUTPATIENT
Start: 2022-07-10

## 2022-07-10 RX ORDER — ONDANSETRON 4 MG/1
8 TABLET, ORALLY DISINTEGRATING ORAL EVERY 6 HOURS PRN
Qty: 15 TABLET | Refills: 0 | Status: SHIPPED | OUTPATIENT
Start: 2022-07-10

## 2022-07-10 NOTE — PROGRESS NOTES
"Subjective:       Patient ID: Judie Munoz is a 41 y.o. female.    Vitals:  height is 5' 7" (1.702 m) and weight is 89.4 kg (196 lb 15.7 oz). Her tympanic temperature is 97.4 °F (36.3 °C). Her blood pressure is 136/86 and her pulse is 69. Her respiration is 17 and oxygen saturation is 100%.     Chief Complaint: Sore Throat    Patient is a 41 year old female who presents to Urgent Care complaining of throat irritation, nausea and abdominal pain that began Friday. Patient has been in close contact with several people at her employment that tested positive for Covid and she believes her symptoms may be related. Patient states that after each meal she begins to have severe abdominal pain and diarrhea with extreme nausea. Patient rates abdominal pain currently 6/10. Patient does note trouble swallowing due to throat irritation as well as a mild headache and sweats. Patient has not tried any treatments for symptoms. Patient denies any fever or SOB.    Sore Throat   This is a new problem. The current episode started in the past 7 days. Neither side of throat is experiencing more pain than the other. There has been no fever. The pain is at a severity of 6/10. The pain is moderate. Associated symptoms include abdominal pain, diarrhea, headaches and trouble swallowing. Pertinent negatives include no congestion, coughing, drooling, ear discharge, ear pain, hoarse voice, plugged ear sensation, neck pain, shortness of breath, stridor, swollen glands or vomiting. She has had no exposure to strep or mono. She has tried nothing for the symptoms.       HENT: Positive for sore throat and trouble swallowing. Negative for ear pain, ear discharge, drooling and congestion.    Neck: Negative for neck pain.   Respiratory: Negative for cough, shortness of breath and stridor.    Gastrointestinal: Positive for abdominal pain and diarrhea. Negative for vomiting.   Neurological: Positive for headaches.          Past Medical History: "   Diagnosis Date    Hypertension          .  Past Surgical History:   Procedure Laterality Date    D&C x 3       miscarriages         Social History     Socioeconomic History    Marital status: Single    Number of children: 2   Occupational History     Employer: [employer] AND [employer]   Tobacco Use    Smoking status: Never Smoker    Smokeless tobacco: Never Used   Substance and Sexual Activity    Alcohol use: No     Alcohol/week: 0.0 standard drinks    Drug use: No    Sexual activity: Not Currently     Partners: Male     Social Determinants of Health     Financial Resource Strain: Medium Risk    Difficulty of Paying Living Expenses: Somewhat hard   Food Insecurity: No Food Insecurity    Worried About Running Out of Food in the Last Year: Never true    Ran Out of Food in the Last Year: Never true   Transportation Needs: No Transportation Needs    Lack of Transportation (Medical): No    Lack of Transportation (Non-Medical): No   Physical Activity: Insufficiently Active    Days of Exercise per Week: 3 days    Minutes of Exercise per Session: 40 min   Stress: No Stress Concern Present    Feeling of Stress : Only a little   Social Connections: Unknown    Frequency of Communication with Friends and Family: Three times a week    Frequency of Social Gatherings with Friends and Family: Twice a week    Active Member of Clubs or Organizations: Yes    Attends Club or Organization Meetings: 1 to 4 times per year    Marital Status: Never    Housing Stability: Low Risk     Unable to Pay for Housing in the Last Year: No    Number of Places Lived in the Last Year: 1    Unstable Housing in the Last Year: No       Family History   Problem Relation Age of Onset    Heart disease Mother         a fib    Hypertension Mother     Diabetes Father     Cancer Maternal Aunt     Diabetes Paternal Aunt     Diabetes Maternal Grandmother     Heart disease Maternal Grandmother     Kidney disease Maternal  Grandmother          ROS:  GENERAL: fever, chills with body aches, COVID exposure   SKIN: No rashes, itching or changes in color or texture of skin.   HEENT:  Reports sore throat, runny nose, nasal congestion, headache  NODES: No masses or lesions. Denies swollen glands.   CHEST: reports cough   CARDIOVASCULAR: Denies chest pain, shortness of breath.  ABDOMEN:  No nausea, diarrhea, abdominal pain  MUSCULOSKELETAL: No joint stiffness or swelling. Denies back pain.  NEUROLOGIC: No history of seizures, paralysis, alteration of gait or coordination.  PSYCHIATRIC: Denies mood swings, depression or suicidal thoughts.    PE:   APPEARANCE: Well nourished, well developed, in mild distress.  Temp 97.4°, pulse ox 100%  V/S: Reviewed.  SKIN: Normal skin turgor, no lesions.  HEENT: Turbinates injected, mucous membranes okay, minimal red pharynx, post pharynx with thick mucus, TM's poor light reflex bilateral, no facial tenderness.  CHEST: Lungs clear to auscultation. No wheezing  CARDIOVASCULAR: Regular rate and rhythm.  ABDOMEN: Soft. No tenderness or masses.  NEUROLOGIC: No sensory deficits. Gait & Posture: Normal, No cerebellar signs.  MENTAL STATUS: Patient alert, oriented x 3 & conversant.    PLAN: Lab work POCT rapid Covid 19 screen/negative  Advise increase p.o. fluids--water/juice & rest  Meds:  Flonase and zofran  / no refills  Simply saline nasal wash to irrigate sinuses and for congestion/runny nose.  Cool mist humidifier/vaporizer.  Practice good handwashing.  Advise use of green tea with honey  Tylenol or Ibuprofen for fever, headache and body aches.  Warm salt water gargles for throat comfort.  Chloraseptic spray or lozenges for throat comfort.  Advise follow up with PCP in 2-3 days for recheck  Advise go to ER if symptoms worsen or fail to improve with treatment.  Instructions, follow up, and supportive care as per AVS.  AVS provided and reviewed with patient including supportive care, follow up, and red flag  symptoms.   Patient verbalizes understanding and agrees with treatment plan. Discharged from Urgent Care in stable condition.  You have tested negative for COVID-19 today. If you did not have any close exposure as defined below, then effective today, you can return to your normal daily activities including social distancing, wearing masks, and frequent handwashing.    DIAGNOSIS:  Pharyngitis  Diarrhea  Nausea  Flu like symptoms  COVID-19 exposure

## 2022-07-10 NOTE — PATIENT INSTRUCTIONS
PLAN: Lab work POCT rapid Covid 19 screen/negative  Advise increase p.o. fluids--water/juice & rest  Meds:  Flonase and zofran  / no refills  Simply saline nasal wash to irrigate sinuses and for congestion/runny nose.  Cool mist humidifier/vaporizer.  Practice good handwashing.  Advise use of green tea with honey  Tylenol or Ibuprofen for fever, headache and body aches.  Warm salt water gargles for throat comfort.  Chloraseptic spray or lozenges for throat comfort.  Advise follow up with PCP in 2-3 days for recheck  Advise go to ER if symptoms worsen or fail to improve with treatment.  Instructions, follow up, and supportive care as per AVS.  AVS provided and reviewed with patient including supportive care, follow up, and red flag symptoms.   Patient verbalizes understanding and agrees with treatment plan. Discharged from Urgent Care in stable condition.  You have tested negative for COVID-19 today. If you did not have any close exposure as defined below, then effective today, you can return to your normal daily activities including social distancing, wearing masks, and frequent handwashing.

## 2022-07-13 ENCOUNTER — PATIENT OUTREACH (OUTPATIENT)
Dept: ADMINISTRATIVE | Facility: HOSPITAL | Age: 42
End: 2022-07-13
Payer: COMMERCIAL

## 2022-07-13 NOTE — PROGRESS NOTES
Cervical No outreach in June: Called and spoke with patient in regards to overdue pap smear. Pt says she has not went yet she will have results faxed over when she does go .      Immunizations Updated  Care Everywhere Updated  [facility] Searched-None found  [facility] Searched-None found    
movement

## 2022-08-04 ENCOUNTER — PATIENT MESSAGE (OUTPATIENT)
Dept: ADMINISTRATIVE | Facility: HOSPITAL | Age: 42
End: 2022-08-04
Payer: COMMERCIAL

## 2022-08-23 ENCOUNTER — HOSPITAL ENCOUNTER (OUTPATIENT)
Dept: RADIOLOGY | Facility: HOSPITAL | Age: 42
Discharge: HOME | End: 2022-08-23
Attending: FAMILY MEDICINE
Payer: COMMERCIAL

## 2022-08-23 DIAGNOSIS — Z12.31 OTHER SCREENING MAMMOGRAM: ICD-10-CM

## 2022-08-23 PROCEDURE — 77063 MAMMO DIGITAL SCREENING BILAT WITH TOMO: ICD-10-PCS | Mod: 26,,, | Performed by: RADIOLOGY

## 2022-08-23 PROCEDURE — 77067 MAMMO DIGITAL SCREENING BILAT WITH TOMO: ICD-10-PCS | Mod: 26,,, | Performed by: RADIOLOGY

## 2022-08-23 PROCEDURE — 77067 SCR MAMMO BI INCL CAD: CPT | Mod: 26,,, | Performed by: RADIOLOGY

## 2022-08-23 PROCEDURE — 77067 SCR MAMMO BI INCL CAD: CPT | Mod: TC

## 2022-08-23 PROCEDURE — 77063 BREAST TOMOSYNTHESIS BI: CPT | Mod: TC

## 2022-08-23 PROCEDURE — 77063 BREAST TOMOSYNTHESIS BI: CPT | Mod: 26,,, | Performed by: RADIOLOGY

## 2022-08-24 DIAGNOSIS — I10 HYPERTENSION: ICD-10-CM

## 2022-09-12 ENCOUNTER — OFFICE VISIT (OUTPATIENT)
Dept: INTERNAL MEDICINE | Facility: CLINIC | Age: 42
End: 2022-09-12
Payer: COMMERCIAL

## 2022-09-12 VITALS
BODY MASS INDEX: 30.31 KG/M2 | TEMPERATURE: 99 F | SYSTOLIC BLOOD PRESSURE: 120 MMHG | HEART RATE: 112 BPM | DIASTOLIC BLOOD PRESSURE: 80 MMHG | OXYGEN SATURATION: 98 % | WEIGHT: 193.13 LBS | HEIGHT: 67 IN

## 2022-09-12 DIAGNOSIS — R52 GENERALIZED BODY ACHES: ICD-10-CM

## 2022-09-12 DIAGNOSIS — I10 PRIMARY HYPERTENSION: ICD-10-CM

## 2022-09-12 DIAGNOSIS — E66.9 OBESITY (BMI 30-39.9): ICD-10-CM

## 2022-09-12 DIAGNOSIS — J35.1 TONSILLAR HYPERTROPHY: ICD-10-CM

## 2022-09-12 DIAGNOSIS — J02.9 PHARYNGITIS, UNSPECIFIED ETIOLOGY: Primary | ICD-10-CM

## 2022-09-12 LAB
CTP QC/QA: YES
CTP QC/QA: YES
FLUAV AG NPH QL: NEGATIVE
FLUBV AG NPH QL: NEGATIVE
GROUP A STREP, MOLECULAR: NEGATIVE
SARS-COV-2 RDRP RESP QL NAA+PROBE: NEGATIVE

## 2022-09-12 PROCEDURE — 3008F PR BODY MASS INDEX (BMI) DOCUMENTED: ICD-10-PCS | Mod: CPTII,S$GLB,,

## 2022-09-12 PROCEDURE — 3079F DIAST BP 80-89 MM HG: CPT | Mod: CPTII,S$GLB,,

## 2022-09-12 PROCEDURE — U0002 COVID-19 LAB TEST NON-CDC: HCPCS | Mod: QW,S$GLB,,

## 2022-09-12 PROCEDURE — 87804 POCT INFLUENZA A/B: ICD-10-PCS | Mod: QW,S$GLB,,

## 2022-09-12 PROCEDURE — 3079F PR MOST RECENT DIASTOLIC BLOOD PRESSURE 80-89 MM HG: ICD-10-PCS | Mod: CPTII,S$GLB,,

## 2022-09-12 PROCEDURE — 3074F PR MOST RECENT SYSTOLIC BLOOD PRESSURE < 130 MM HG: ICD-10-PCS | Mod: CPTII,S$GLB,,

## 2022-09-12 PROCEDURE — 1160F PR REVIEW ALL MEDS BY PRESCRIBER/CLIN PHARMACIST DOCUMENTED: ICD-10-PCS | Mod: CPTII,S$GLB,,

## 2022-09-12 PROCEDURE — 3008F BODY MASS INDEX DOCD: CPT | Mod: CPTII,S$GLB,,

## 2022-09-12 PROCEDURE — 99999 PR PBB SHADOW E&M-EST. PATIENT-LVL IV: CPT | Mod: PBBFAC,,,

## 2022-09-12 PROCEDURE — 1159F MED LIST DOCD IN RCRD: CPT | Mod: CPTII,S$GLB,,

## 2022-09-12 PROCEDURE — 1160F RVW MEDS BY RX/DR IN RCRD: CPT | Mod: CPTII,S$GLB,,

## 2022-09-12 PROCEDURE — U0002: ICD-10-PCS | Mod: QW,S$GLB,,

## 2022-09-12 PROCEDURE — 99214 PR OFFICE/OUTPT VISIT, EST, LEVL IV, 30-39 MIN: ICD-10-PCS | Mod: S$GLB,,,

## 2022-09-12 PROCEDURE — 99999 PR PBB SHADOW E&M-EST. PATIENT-LVL IV: ICD-10-PCS | Mod: PBBFAC,,,

## 2022-09-12 PROCEDURE — 87651 STREP A DNA AMP PROBE: CPT

## 2022-09-12 PROCEDURE — 87804 INFLUENZA ASSAY W/OPTIC: CPT | Mod: QW,S$GLB,,

## 2022-09-12 PROCEDURE — 1159F PR MEDICATION LIST DOCUMENTED IN MEDICAL RECORD: ICD-10-PCS | Mod: CPTII,S$GLB,,

## 2022-09-12 PROCEDURE — 3074F SYST BP LT 130 MM HG: CPT | Mod: CPTII,S$GLB,,

## 2022-09-12 PROCEDURE — 99214 OFFICE O/P EST MOD 30 MIN: CPT | Mod: S$GLB,,,

## 2022-09-12 NOTE — PROGRESS NOTES
Judie Munoz  09/12/2022  [id number]    Ritika Tong MD  Patient Care Team:  Ritika Tong MD as PCP - General (Family Medicine)  Lilibeth Callejas LPN as Care Coordinator (Internal Medicine)          Visit Type:an urgent visit for a new problem    Chief Complaint:  Chief Complaint   Patient presents with    Sore Throat    Generalized Body Aches       History of Present Illness:    41 year old female presents today for a sore throat and generalized body aches  Symptoms started on yesterday  She does have  pain when she swallows. Denies congestion or fever      History:  Past Medical History:   Diagnosis Date    Hypertension      Past Surgical History:   Procedure Laterality Date    D&C x 3       miscarriages     Family History   Problem Relation Age of Onset    Heart disease Mother         a fib    Hypertension Mother     Diabetes Father     Cancer Maternal Aunt     Diabetes Paternal Aunt     Diabetes Maternal Grandmother     Heart disease Maternal Grandmother     Kidney disease Maternal Grandmother      Social History     Socioeconomic History    Marital status: Single    Number of children: 2   Occupational History     Employer: [employer]   Tobacco Use    Smoking status: Never    Smokeless tobacco: Never   Substance and Sexual Activity    Alcohol use: No     Alcohol/week: 0.0 standard drinks    Drug use: No    Sexual activity: Not Currently     Partners: Male     Social Determinants of Health     Financial Resource Strain: Medium Risk    Difficulty of Paying Living Expenses: Somewhat hard   Food Insecurity: No Food Insecurity    Worried About Running Out of Food in the Last Year: Never true    Ran Out of Food in the Last Year: Never true   Transportation Needs: No Transportation Needs    Lack of Transportation (Medical): No    Lack of Transportation (Non-Medical): No   Physical Activity: Insufficiently Active    Days of Exercise per Week: 3 days    Minutes of  Exercise per Session: 40 min   Stress: No Stress Concern Present    Feeling of Stress : Only a little   Social Connections: Unknown    Frequency of Communication with Friends and Family: Three times a week    Frequency of Social Gatherings with Friends and Family: Twice a week    Active Member of Clubs or Organizations: Yes    Attends Club or Organization Meetings: 1 to 4 times per year    Marital Status: Never    Housing Stability: Low Risk     Unable to Pay for Housing in the Last Year: No    Number of Places Lived in the Last Year: 1    Unstable Housing in the Last Year: No     Patient Active Problem List   Diagnosis    Hypertension    Migraines    Thyroid nodule     Review of patient's allergies indicates:  No Known Allergies    The following were reviewed at this visit: active problem list, medication list, allergies, family history, social history, and health maintenance.    Medications:  Current Outpatient Medications on File Prior to Visit   Medication Sig Dispense Refill    amLODIPine (NORVASC) 5 MG tablet Take 1 tablet (5 mg total) by mouth once daily. 90 tablet 0    butalbital-acetaminophen-caffeine -40 mg (FIORICET, ESGIC) -40 mg per tablet Take 1 tablet by mouth every 8 (eight) hours as needed for Headaches. 30 tablet 1    diethylpropion (TENUATE) 75 mg SR tablet Take 75 mg by mouth once daily.      fluticasone propionate (FLONASE) 50 mcg/actuation nasal spray 2 sprays (100 mcg total) by Each Nostril route once daily. 16 g 1    hydroCHLOROthiazide (HYDRODIURIL) 25 MG tablet Take 1 tablet (25 mg total) by mouth once daily. 90 tablet 0    levonorgestrel (MIRENA IU) by Intrauterine route.      ondansetron (ZOFRAN-ODT) 4 MG TbDL Take 2 tablets (8 mg total) by mouth every 6 (six) hours as needed (nausea). 20 tablet 0    ondansetron (ZOFRAN-ODT) 4 MG TbDL Take 2 tablets (8 mg total) by mouth every 6 (six) hours as needed (prn). 15 tablet 0    ZOLMitriptan (ZOMIG-ZMT) 5  MG disintegrating tablet Take 1 tablet (5 mg total) by mouth as needed for Migraine. 18 tablet 1    albuterol (PROVENTIL HFA) 90 mcg/actuation inhaler Inhale 2 puffs into the lungs every 6 (six) hours as needed for Wheezing. Rescue (Patient not taking: No sig reported) 18 g 0    hydrocortisone (ANUSOL-HC) 2.5 % rectal cream Place rectally 2 (two) times daily. (Patient not taking: No sig reported) 28 g 0    ketoconazole (NIZORAL) 2 % cream Apply topically 3 (three) times daily. (Patient not taking: Reported on 9/12/2022) 30 g 0    nystatin (MYCOSTATIN) cream Apply topically 2 (two) times daily. (Patient not taking: No sig reported) 15 g 0    phentermine (ADIPEX-P) 37.5 mg tablet Take 37.5 mg by mouth before breakfast. Patient taking half      topiramate (TOPAMAX) 25 MG tablet Take 1 tablet (25 mg total) by mouth 2 (two) times daily. 60 tablet 11     No current facility-administered medications on file prior to visit.       Medications have been reviewed and reconciled with patient at this visit.  Barriers to medications reviewed with patient.    Adverse reactions to current medications reviewed with patient..    Over the counter medications reviewed and reconciled with patient.    Exam:  Wt Readings from Last 3 Encounters:   09/12/22 87.6 kg (193 lb 2 oz)   07/10/22 89.4 kg (196 lb 15.7 oz)   05/11/22 89 kg (196 lb 3.4 oz)     Temp Readings from Last 3 Encounters:   09/12/22 98.8 °F (37.1 °C) (Temporal)   07/10/22 97.4 °F (36.3 °C) (Tympanic)   05/11/22 99 °F (37.2 °C) (Temporal)     BP Readings from Last 3 Encounters:   09/12/22 120/80   07/10/22 136/86   05/11/22 110/84     Pulse Readings from Last 3 Encounters:   09/12/22 (!) 112   07/10/22 69   05/11/22 86     Body mass index is 30.25 kg/m².      Review of Systems   Constitutional:  Positive for malaise/fatigue.   HENT:  Positive for sore throat.    Physical Exam  Vitals and nursing note reviewed.   Constitutional:       General: She is not in acute  distress.     Appearance: She is well-developed. She is obese. She is not diaphoretic.   HENT:      Head: Normocephalic and atraumatic.      Salivary Glands: Right salivary gland is tender. Left salivary gland is tender.      Right Ear: External ear normal.      Left Ear: External ear normal.      Nose: Nose normal.      Mouth/Throat:      Pharynx: Uvula midline. Posterior oropharyngeal erythema present.      Tonsils: No tonsillar exudate or tonsillar abscesses. 3+ on the right. 3+ on the left.   Eyes:      General:         Right eye: No discharge.         Left eye: No discharge.      Conjunctiva/sclera: Conjunctivae normal.      Pupils: Pupils are equal, round, and reactive to light.   Neck:      Thyroid: No thyromegaly.   Cardiovascular:      Rate and Rhythm: Normal rate and regular rhythm.      Heart sounds: Normal heart sounds. No murmur heard.  Pulmonary:      Effort: Pulmonary effort is normal. No respiratory distress.      Breath sounds: Normal breath sounds. No wheezing.   Abdominal:      General: Bowel sounds are normal.   Neurological:      Mental Status: She is alert and oriented to person, place, and time.      Cranial Nerves: No cranial nerve deficit.   Psychiatric:         Behavior: Behavior normal.         Thought Content: Thought content normal.         Judgment: Judgment normal.       Laboratory Reviewed ({Yes)  Lab Results   Component Value Date    WBC 9.38 04/24/2017    HGB 13.1 04/24/2017    HCT 40.5 04/24/2017     04/24/2017    CHOL 164 02/03/2020    TRIG 57 02/03/2020    HDL 45 02/03/2020    ALT 15 02/03/2020    AST 19 02/03/2020     02/03/2020    K 3.3 (L) 02/03/2020     02/03/2020    CREATININE 0.8 02/03/2020    BUN 10 02/03/2020    CO2 29 02/03/2020    TSH 1.099 04/24/2017    HGBA1C 5.8 (H) 07/22/2020       Judie was seen today for sore throat and generalized body aches.    Diagnoses and all orders for this visit:    Pharyngitis, unspecified etiology  -     POCT COVID-19  Rapid Screening  -     Group A Strep, Molecular  -     POCT INFLUENZA A/B    Primary hypertension  At goal during visit     Tonsillar hypertrophy  -     POCT COVID-19 Rapid Screening  -     Group A Strep, Molecular  -     POCT INFLUENZA A/B    Obesity (BMI 30-39.9)  Encouraged healthy diet and exercise as tolerated to help bring BMI into normal range.      Generalized body aches  -     POCT COVID-19 Rapid Screening  -     Group A Strep, Molecular  -     POCT INFLUENZA A/B      Negative for flu, COVID, and strep  Would recommend that she retests for COVID in 2 days   Explained that she could have tested too early for COVID  Cont using daily antihistamine  Warm liquids for pain          Care Plan/Goals: Reviewed    Goals         Blood Pressure < 130/80 (pt-stated)       Exercise at least 150 minutes per week.       Keep up the great work with getting 30 min of exercise most days per week!         Maintain a low sodium diet       Aim for less than 2000 mg sodium per day and look for items with less than 140 mg sodium per serving        Take at least one BP reading per week at various times of the day             Follow up: No follow-ups on file.    After visit summary was printed and given to patient upon discharge today.  Patient goals and care plan are included in After Visit Summary.

## 2022-09-14 ENCOUNTER — PATIENT MESSAGE (OUTPATIENT)
Dept: INTERNAL MEDICINE | Facility: CLINIC | Age: 42
End: 2022-09-14

## 2022-09-14 ENCOUNTER — OFFICE VISIT (OUTPATIENT)
Dept: INTERNAL MEDICINE | Facility: CLINIC | Age: 42
End: 2022-09-14
Payer: COMMERCIAL

## 2022-09-14 DIAGNOSIS — B96.89 ACUTE BACTERIAL SINUSITIS: Primary | ICD-10-CM

## 2022-09-14 DIAGNOSIS — J01.90 ACUTE BACTERIAL SINUSITIS: Primary | ICD-10-CM

## 2022-09-14 PROCEDURE — 1159F PR MEDICATION LIST DOCUMENTED IN MEDICAL RECORD: ICD-10-PCS | Mod: CPTII,95,,

## 2022-09-14 PROCEDURE — 1159F MED LIST DOCD IN RCRD: CPT | Mod: CPTII,95,,

## 2022-09-14 PROCEDURE — 99213 OFFICE O/P EST LOW 20 MIN: CPT | Mod: 95,,,

## 2022-09-14 PROCEDURE — 1160F PR REVIEW ALL MEDS BY PRESCRIBER/CLIN PHARMACIST DOCUMENTED: ICD-10-PCS | Mod: CPTII,95,,

## 2022-09-14 PROCEDURE — 1160F RVW MEDS BY RX/DR IN RCRD: CPT | Mod: CPTII,95,,

## 2022-09-14 PROCEDURE — 99213 PR OFFICE/OUTPT VISIT, EST, LEVL III, 20-29 MIN: ICD-10-PCS | Mod: 95,,,

## 2022-09-14 RX ORDER — PROMETHAZINE HYDROCHLORIDE AND PHENYLEPHRINE HYDROCHLORIDE 6.25; 5 MG/5ML; MG/5ML
5 SYRUP ORAL EVERY 6 HOURS PRN
Qty: 118 ML | Refills: 0 | Status: SHIPPED | OUTPATIENT
Start: 2022-09-14 | End: 2022-09-24

## 2022-09-14 RX ORDER — AMOXICILLIN AND CLAVULANATE POTASSIUM 875; 125 MG/1; MG/1
1 TABLET, FILM COATED ORAL EVERY 12 HOURS
Qty: 20 TABLET | Refills: 0 | Status: SHIPPED | OUTPATIENT
Start: 2022-09-14 | End: 2023-03-28

## 2022-09-14 NOTE — PROGRESS NOTES
Judie Munoz  09/14/2022  [id number]    Ritika Tong MD  Patient Care Team:  Ritika Tong MD as PCP - General (Family Medicine)  Lilibeth Callejas LPN as Care Coordinator (Internal Medicine)          Visit Type:an urgent visit for an existing problem     Chief Complaint:  Chief Complaint   Patient presents with    Cough    Nasal Congestion    Sinus Problem       History of Present Illness:    Her throat pain has improved  She is congested and has a cough   She coughs when she is laying flat  Having body aches and fatigue   Congested and having tenderness in her face  Using daily antihistamine   NO SOB     [place]  The chief complaint leading to consultation is: congestion    Visit type: audiovisual    Face to Face time with patient: 8   minutes of total time spent on the encounter, which includes face to face time and non-face to face time preparing to see the patient (eg, review of tests), Obtaining and/or reviewing separately obtained history, Documenting clinical information in the electronic or other health record, Independently interpreting results (not separately reported) and communicating results to the patient/family/caregiver, or Care coordination (not separately reported).         Each patient to whom he or she provides medical services by telemedicine is:  (1) informed of the relationship between the physician and patient and the respective role of any other health care provider with respect to management of the patient; and (2) notified that he or she may decline to receive medical services by telemedicine and may withdraw from such care at any time.    Notes:      History:  Past Medical History:   Diagnosis Date    Hypertension      Past Surgical History:   Procedure Laterality Date    D&C x 3       miscarriages     Family History   Problem Relation Age of Onset    Heart disease Mother         a fib    Hypertension Mother     Diabetes Father     Cancer Maternal  Aunt     Diabetes Paternal Aunt     Diabetes Maternal Grandmother     Heart disease Maternal Grandmother     Kidney disease Maternal Grandmother      Social History     Socioeconomic History    Marital status: Single    Number of children: 2   Occupational History     Employer: [employer]   Tobacco Use    Smoking status: Never    Smokeless tobacco: Never   Substance and Sexual Activity    Alcohol use: No     Alcohol/week: 0.0 standard drinks    Drug use: No    Sexual activity: Not Currently     Partners: Male     Social Determinants of Health     Financial Resource Strain: Low Risk     Difficulty of Paying Living Expenses: Not very hard   Food Insecurity: Food Insecurity Present    Worried About Running Out of Food in the Last Year: Sometimes true    Ran Out of Food in the Last Year: Never true   Transportation Needs: No Transportation Needs    Lack of Transportation (Medical): No    Lack of Transportation (Non-Medical): No   Physical Activity: Insufficiently Active    Days of Exercise per Week: 3 days    Minutes of Exercise per Session: 40 min   Stress: Stress Concern Present    Feeling of Stress : Very much   Social Connections: Unknown    Frequency of Communication with Friends and Family: More than three times a week    Frequency of Social Gatherings with Friends and Family: Twice a week    Active Member of Clubs or Organizations: Yes    Attends Club or Organization Meetings: More than 4 times per year    Marital Status: Never    Housing Stability: Low Risk     Unable to Pay for Housing in the Last Year: No    Number of Places Lived in the Last Year: 1    Unstable Housing in the Last Year: No     Patient Active Problem List   Diagnosis    Hypertension    Migraines    Thyroid nodule     Review of patient's allergies indicates:  No Known Allergies    The following were reviewed at this visit: active problem list, medication list, allergies, family history,  social history, and health maintenance.    Medications:  Current Outpatient Medications on File Prior to Visit   Medication Sig Dispense Refill    amLODIPine (NORVASC) 5 MG tablet Take 1 tablet (5 mg total) by mouth once daily. 90 tablet 0    butalbital-acetaminophen-caffeine -40 mg (FIORICET, ESGIC) -40 mg per tablet Take 1 tablet by mouth every 8 (eight) hours as needed for Headaches. 30 tablet 1    diethylpropion (TENUATE) 75 mg SR tablet Take 75 mg by mouth once daily.      fluticasone propionate (FLONASE) 50 mcg/actuation nasal spray 2 sprays (100 mcg total) by Each Nostril route once daily. 16 g 1    hydroCHLOROthiazide (HYDRODIURIL) 25 MG tablet Take 1 tablet (25 mg total) by mouth once daily. 90 tablet 0    levonorgestrel (MIRENA IU) by Intrauterine route.      ondansetron (ZOFRAN-ODT) 4 MG TbDL Take 2 tablets (8 mg total) by mouth every 6 (six) hours as needed (nausea). 20 tablet 0    ondansetron (ZOFRAN-ODT) 4 MG TbDL Take 2 tablets (8 mg total) by mouth every 6 (six) hours as needed (prn). 15 tablet 0    phentermine (ADIPEX-P) 37.5 mg tablet Take 37.5 mg by mouth before breakfast. Patient taking half      ZOLMitriptan (ZOMIG-ZMT) 5 MG disintegrating tablet Take 1 tablet (5 mg total) by mouth as needed for Migraine. 18 tablet 1    albuterol (PROVENTIL HFA) 90 mcg/actuation inhaler Inhale 2 puffs into the lungs every 6 (six) hours as needed for Wheezing. Rescue (Patient not taking: No sig reported) 18 g 0    hydrocortisone (ANUSOL-HC) 2.5 % rectal cream Place rectally 2 (two) times daily. (Patient not taking: No sig reported) 28 g 0    ketoconazole (NIZORAL) 2 % cream Apply topically 3 (three) times daily. (Patient not taking: No sig reported) 30 g 0    nystatin (MYCOSTATIN) cream Apply topically 2 (two) times daily. (Patient not taking: No sig reported) 15 g 0    topiramate (TOPAMAX) 25 MG tablet Take 1 tablet (25 mg total) by mouth 2 (two) times daily. 60 tablet 11     No  current facility-administered medications on file prior to visit.       Medications have been reviewed and reconciled with patient at this visit.  Barriers to medications reviewed with patient.    Adverse reactions to current medications reviewed with patient..    Over the counter medications reviewed and reconciled with patient.    Exam:  Wt Readings from Last 3 Encounters:   09/12/22 87.6 kg (193 lb 2 oz)   07/10/22 89.4 kg (196 lb 15.7 oz)   05/11/22 89 kg (196 lb 3.4 oz)     Temp Readings from Last 3 Encounters:   09/12/22 98.8 °F (37.1 °C) (Temporal)   07/10/22 97.4 °F (36.3 °C) (Tympanic)   05/11/22 99 °F (37.2 °C) (Temporal)     BP Readings from Last 3 Encounters:   09/12/22 120/80   07/10/22 136/86   05/11/22 110/84     Pulse Readings from Last 3 Encounters:   09/12/22 (!) 112   07/10/22 69   05/11/22 86     There is no height or weight on file to calculate BMI.      Review of Systems   Constitutional:  Positive for chills and malaise/fatigue. Negative for fever and weight loss.   HENT:  Positive for congestion, ear pain and sinus pain. Negative for sore throat.    Respiratory:  Positive for cough. Negative for hemoptysis, shortness of breath and wheezing.    Gastrointestinal:  Negative for heartburn.   Musculoskeletal:  Positive for myalgias.   Skin:  Negative for rash.   Neurological:  Negative for headaches.   Endo/Heme/Allergies:  Negative for environmental allergies.   Physical Exam  Nursing note reviewed.   Constitutional:       Appearance: She is ill-appearing.   Pulmonary:      Effort: Pulmonary effort is normal. No respiratory distress.   Neurological:      Mental Status: She is alert and oriented to person, place, and time.   Psychiatric:         Mood and Affect: Mood normal.         Behavior: Behavior normal.         Thought Content: Thought content normal.         Judgment: Judgment normal.       Laboratory Reviewed ({Yes)  Lab Results   Component Value Date    WBC 9.38 04/24/2017    HGB 13.1  04/24/2017    HCT 40.5 04/24/2017     04/24/2017    CHOL 164 02/03/2020    TRIG 57 02/03/2020    HDL 45 02/03/2020    ALT 15 02/03/2020    AST 19 02/03/2020     02/03/2020    K 3.3 (L) 02/03/2020     02/03/2020    CREATININE 0.8 02/03/2020    BUN 10 02/03/2020    CO2 29 02/03/2020    TSH 1.099 04/24/2017    HGBA1C 5.8 (H) 07/22/2020       Judie was seen today for cough, nasal congestion and sinus problem.    Diagnoses and all orders for this visit:    Acute bacterial sinusitis  -     promethazine-phenylephrine (PROMETHAZINE VC) 6.25-5 mg/5 mL syrup; Take 5 mLs by mouth every 6 (six) hours as needed (cough).  -     amoxicillin-clavulanate 875-125mg (AUGMENTIN) 875-125 mg per tablet; Take 1 tablet by mouth every 12 (twelve) hours.    Negative for COVID on Monday and home test negative as well           Care Plan/Goals: Reviewed    Goals         Blood Pressure < 130/80 (pt-stated)       Exercise at least 150 minutes per week.       Keep up the great work with getting 30 min of exercise most days per week!         Maintain a low sodium diet       Aim for less than 2000 mg sodium per day and look for items with less than 140 mg sodium per serving        Take at least one BP reading per week at various times of the day             Follow up: No follow-ups on file.    After visit summary was printed and given to patient upon discharge today.  Patient goals and care plan are included in After Visit Summary.      Answers submitted by the patient for this visit:  Cough Questionnaire (Submitted on 9/14/2022)  Chief Complaint: Cough  Chronicity: new  Onset: yesterday  Progression since onset: unchanged  Frequency: every few hours  Cough characteristics: non-productive  ear congestion: Yes  nasal congestion: Yes  postnasal drip: Yes  rhinorrhea: Yes  sweats: Yes  Aggravated by: cold air, lying down  asthma: No  bronchiectasis: No  bronchitis: Yes  emphysema: No  pneumonia: No  Treatments tried: OTC cough  suppressant, a beta-agonist inhaler  Improvement on treatment: significant

## 2022-12-30 ENCOUNTER — TELEPHONE (OUTPATIENT)
Dept: URGENT CARE | Facility: CLINIC | Age: 42
End: 2022-12-30
Payer: COMMERCIAL

## 2022-12-30 ENCOUNTER — OFFICE VISIT (OUTPATIENT)
Dept: URGENT CARE | Facility: CLINIC | Age: 42
End: 2022-12-30
Payer: COMMERCIAL

## 2022-12-30 ENCOUNTER — PATIENT MESSAGE (OUTPATIENT)
Dept: INTERNAL MEDICINE | Facility: CLINIC | Age: 42
End: 2022-12-30
Payer: COMMERCIAL

## 2022-12-30 ENCOUNTER — HOSPITAL ENCOUNTER (OUTPATIENT)
Dept: RADIOLOGY | Facility: CLINIC | Age: 42
Discharge: HOME | End: 2022-12-30
Attending: NURSE PRACTITIONER
Payer: COMMERCIAL

## 2022-12-30 VITALS
HEART RATE: 81 BPM | BODY MASS INDEX: 30.29 KG/M2 | SYSTOLIC BLOOD PRESSURE: 129 MMHG | DIASTOLIC BLOOD PRESSURE: 62 MMHG | TEMPERATURE: 100 F | WEIGHT: 193 LBS | HEIGHT: 67 IN | OXYGEN SATURATION: 99 % | RESPIRATION RATE: 16 BRPM

## 2022-12-30 DIAGNOSIS — R05.1 ACUTE COUGH: ICD-10-CM

## 2022-12-30 DIAGNOSIS — J02.9 PHARYNGITIS, UNSPECIFIED ETIOLOGY: ICD-10-CM

## 2022-12-30 DIAGNOSIS — B96.89 BACTERIAL UPPER RESPIRATORY INFECTION: Primary | ICD-10-CM

## 2022-12-30 DIAGNOSIS — J98.11 ATELECTASIS: ICD-10-CM

## 2022-12-30 DIAGNOSIS — J06.9 BACTERIAL UPPER RESPIRATORY INFECTION: Primary | ICD-10-CM

## 2022-12-30 LAB
CTP QC/QA: YES
MOLECULAR STREP A: NEGATIVE
POC MOLECULAR INFLUENZA A AGN: NEGATIVE
POC MOLECULAR INFLUENZA B AGN: NEGATIVE
SARS-COV-2 AG RESP QL IA.RAPID: NEGATIVE

## 2022-12-30 PROCEDURE — 71046 XR CHEST PA AND LATERAL: ICD-10-PCS | Mod: S$GLB,,, | Performed by: RADIOLOGY

## 2022-12-30 PROCEDURE — 87651 STREP A DNA AMP PROBE: CPT | Mod: QW,S$GLB,, | Performed by: NURSE PRACTITIONER

## 2022-12-30 PROCEDURE — 3078F PR MOST RECENT DIASTOLIC BLOOD PRESSURE < 80 MM HG: ICD-10-PCS | Mod: CPTII,S$GLB,, | Performed by: NURSE PRACTITIONER

## 2022-12-30 PROCEDURE — 3008F BODY MASS INDEX DOCD: CPT | Mod: CPTII,S$GLB,, | Performed by: NURSE PRACTITIONER

## 2022-12-30 PROCEDURE — 87651 POCT STREP A MOLECULAR: ICD-10-PCS | Mod: QW,S$GLB,, | Performed by: NURSE PRACTITIONER

## 2022-12-30 PROCEDURE — 71046 X-RAY EXAM CHEST 2 VIEWS: CPT | Mod: S$GLB,,, | Performed by: RADIOLOGY

## 2022-12-30 PROCEDURE — 3074F SYST BP LT 130 MM HG: CPT | Mod: CPTII,S$GLB,, | Performed by: NURSE PRACTITIONER

## 2022-12-30 PROCEDURE — 1159F PR MEDICATION LIST DOCUMENTED IN MEDICAL RECORD: ICD-10-PCS | Mod: CPTII,S$GLB,, | Performed by: NURSE PRACTITIONER

## 2022-12-30 PROCEDURE — 87811 SARS CORONAVIRUS 2 ANTIGEN POCT, MANUAL READ: ICD-10-PCS | Mod: QW,S$GLB,, | Performed by: NURSE PRACTITIONER

## 2022-12-30 PROCEDURE — 87502 POCT INFLUENZA A/B MOLECULAR: ICD-10-PCS | Mod: QW,S$GLB,, | Performed by: NURSE PRACTITIONER

## 2022-12-30 PROCEDURE — 3078F DIAST BP <80 MM HG: CPT | Mod: CPTII,S$GLB,, | Performed by: NURSE PRACTITIONER

## 2022-12-30 PROCEDURE — 1159F MED LIST DOCD IN RCRD: CPT | Mod: CPTII,S$GLB,, | Performed by: NURSE PRACTITIONER

## 2022-12-30 PROCEDURE — 87811 SARS-COV-2 COVID19 W/OPTIC: CPT | Mod: QW,S$GLB,, | Performed by: NURSE PRACTITIONER

## 2022-12-30 PROCEDURE — 99214 OFFICE O/P EST MOD 30 MIN: CPT | Mod: S$GLB,,, | Performed by: NURSE PRACTITIONER

## 2022-12-30 PROCEDURE — 1160F RVW MEDS BY RX/DR IN RCRD: CPT | Mod: CPTII,S$GLB,, | Performed by: NURSE PRACTITIONER

## 2022-12-30 PROCEDURE — 99214 PR OFFICE/OUTPT VISIT, EST, LEVL IV, 30-39 MIN: ICD-10-PCS | Mod: S$GLB,,, | Performed by: NURSE PRACTITIONER

## 2022-12-30 PROCEDURE — 3074F PR MOST RECENT SYSTOLIC BLOOD PRESSURE < 130 MM HG: ICD-10-PCS | Mod: CPTII,S$GLB,, | Performed by: NURSE PRACTITIONER

## 2022-12-30 PROCEDURE — 87502 INFLUENZA DNA AMP PROBE: CPT | Mod: QW,S$GLB,, | Performed by: NURSE PRACTITIONER

## 2022-12-30 PROCEDURE — 1160F PR REVIEW ALL MEDS BY PRESCRIBER/CLIN PHARMACIST DOCUMENTED: ICD-10-PCS | Mod: CPTII,S$GLB,, | Performed by: NURSE PRACTITIONER

## 2022-12-30 PROCEDURE — 3008F PR BODY MASS INDEX (BMI) DOCUMENTED: ICD-10-PCS | Mod: CPTII,S$GLB,, | Performed by: NURSE PRACTITIONER

## 2022-12-30 RX ORDER — GUAIFENESIN/DEXTROMETHORPHAN 100-10MG/5
10 SYRUP ORAL EVERY 4 HOURS PRN
Qty: 118 ML | Refills: 0 | COMMUNITY
Start: 2022-12-30 | End: 2023-01-09

## 2022-12-30 RX ORDER — DOXYCYCLINE 100 MG/1
100 CAPSULE ORAL 2 TIMES DAILY
Qty: 20 CAPSULE | Refills: 0 | Status: SHIPPED | OUTPATIENT
Start: 2022-12-30 | End: 2023-01-09

## 2022-12-30 RX ORDER — ALBUTEROL SULFATE 90 UG/1
2 AEROSOL, METERED RESPIRATORY (INHALATION) EVERY 6 HOURS PRN
Qty: 18 G | Refills: 0 | Status: SHIPPED | OUTPATIENT
Start: 2022-12-30 | End: 2023-12-30

## 2022-12-30 RX ORDER — ALBUTEROL SULFATE 90 UG/1
2 AEROSOL, METERED RESPIRATORY (INHALATION) EVERY 6 HOURS PRN
Qty: 18 G | Refills: 0 | Status: SHIPPED | OUTPATIENT
Start: 2022-12-30 | End: 2022-12-30

## 2022-12-30 RX ORDER — PROMETHAZINE HYDROCHLORIDE AND DEXTROMETHORPHAN HYDROBROMIDE 6.25; 15 MG/5ML; MG/5ML
5 SYRUP ORAL NIGHTLY PRN
Qty: 100 ML | Refills: 0 | Status: SHIPPED | OUTPATIENT
Start: 2022-12-30 | End: 2023-03-28

## 2022-12-30 NOTE — TELEPHONE ENCOUNTER
Pt called in after visit today in Urgent Care asking for a stronger cough medication. Pt asked if she could speak with the provider about this concern.

## 2022-12-30 NOTE — PATIENT INSTRUCTIONS
"Please follow up with your Primary care provider within 2-5 days if your signs and symptoms have not resolved or worsen. Follow up with your PCP in 5 days for repeat chest xray.    Deep breathe to ensure proper inflation of the lungs. Splint with a pillow if painful to deep breathe.   If your condition worsens or fails to improve we recommend that you receive another evaluation at the emergency room immediately or contact your primary medical clinic to discuss your concerns.   You must understand that you have received an Urgent Care treatment only and that you may be released before all of your medical problems are known or treated. You, the patient, will arrange for follow up care as instructed.     RED FLAGS/WARNING SYMPTOMS DISCUSSED WITH PATIENT THAT WOULD WARRANT EMERGENT MEDICAL ATTENTION. PATIENT VERBALIZED UNDERSTANDING.   Cough   If your condition worsens or fails to improve we recommend that you receive another evaluation at the ER immediately or contact your PCP to discuss your concerns or return here. You must understand that you've received an urgent care treatment only and that you may be released before all your medical problems are known or treated. You the patient will arrange for follouwp care as instructed. .  Rest and fluids are important  Can use honey with liz to soothe your throat  Take prescription cough meds (pills) as prescribed; take prescription cough syrup at night as needed for cough.  Do not take both the prescribed cough pills and syrup at the same time or within 6 hours of each other.  Do not take the cough syrup with any other sedative medication as it can can cause drowsiness. Do not operate any heavy machinery, drink or drive while taking the cough syrup.   -  Flonase (fluticasone) is a nasal spray which is available over the counter and may help with your symptoms.   -  If you have hypertension avoid using the "D" which is the decongestant.  Instead you can use Coricidin HBP " for cold and cough symptoms.    -  If you just have drainage you can take plain Zyrtec, Claritin or Allegra   -  Tylenol or ibuprofen can also be used as directed for pain unless you have an allergy to them or medical condition such as stomach ulcers, kidney or liver disease or blood thinners etc for which you should not be taking these type of medications.   Please follow up with your primary care doctor or specialist in the next 48-72hrs as needed and if no improvement  If you  smoke, please stop smoking.

## 2022-12-30 NOTE — PROGRESS NOTES
"Subjective:       Patient ID: Judie Munoz is a 42 y.o. female.    Vitals:  height is 5' 7" (1.702 m) and weight is 87.5 kg (193 lb). Her tympanic temperature is 99.6 °F (37.6 °C). Her blood pressure is 129/62 and her pulse is 81. Her respiration is 16 and oxygen saturation is 99%.     Chief Complaint: Sore Throat    Judie Munoz is a 42 year old female whom presents with sore throat, congestion, cough, and body aches. Patient reports Symptoms started 3 days ago. Patient denies any known sick contacts. Patient reports pain in her right lung with deep breathing. She denies any shortness of breathe.     Sore Throat   This is a new problem. The current episode started in the past 7 days (3). The problem has been gradually worsening. Neither side of throat is experiencing more pain than the other. There has been no fever. The pain is at a severity of 7/10. The pain is moderate. Associated symptoms include congestion, coughing, a hoarse voice and trouble swallowing. Pertinent negatives include no abdominal pain, diarrhea, drooling, ear discharge, ear pain, headaches, plugged ear sensation, neck pain, shortness of breath, stridor, swollen glands or vomiting. She has had no exposure to strep or mono. Treatments tried: thera flu. The treatment provided mild relief.     HENT:  Positive for congestion, sore throat and trouble swallowing. Negative for ear pain, ear discharge and drooling.    Neck: Negative for neck pain.   Respiratory:  Positive for cough. Negative for shortness of breath and stridor.    Gastrointestinal:  Negative for abdominal pain, vomiting and diarrhea.   Neurological:  Negative for headaches.     Objective:      Physical Exam   Constitutional: She is oriented to person, place, and time. She appears well-developed. She is cooperative.  Non-toxic appearance. She does not appear ill. No distress.   HENT:   Head: Normocephalic and atraumatic.   Ears:   Right Ear: Hearing, tympanic membrane, external ear " and ear canal normal.   Left Ear: Hearing, tympanic membrane, external ear and ear canal normal.   Nose: Nose normal. No mucosal edema, rhinorrhea or nasal deformity. No epistaxis. Right sinus exhibits no maxillary sinus tenderness and no frontal sinus tenderness. Left sinus exhibits no maxillary sinus tenderness and no frontal sinus tenderness.   Mouth/Throat: Uvula is midline, oropharynx is clear and moist and mucous membranes are normal. No trismus in the jaw. Normal dentition. No uvula swelling. No oropharyngeal exudate, posterior oropharyngeal edema or posterior oropharyngeal erythema.   Eyes: Conjunctivae and lids are normal. No scleral icterus.   Neck: Trachea normal and phonation normal. Neck supple. No edema present. No erythema present. No neck rigidity present.   Cardiovascular: Normal rate, regular rhythm, normal heart sounds and normal pulses.   Pulmonary/Chest: Effort normal. No respiratory distress. She has no decreased breath sounds. She has wheezes in the right lower field. She has no rhonchi.   Abdominal: Normal appearance.   Musculoskeletal: Normal range of motion.         General: No deformity. Normal range of motion.   Neurological: She is alert and oriented to person, place, and time. She exhibits normal muscle tone. Coordination normal.   Skin: Skin is warm, dry, intact, not diaphoretic and not pale.   Psychiatric: Her speech is normal and behavior is normal. Judgment and thought content normal.   Nursing note and vitals reviewed.      Results for orders placed or performed in visit on 12/30/22   SARS Coronavirus 2 Antigen, POCT Manual Read   Result Value Ref Range    SARS Coronavirus 2 Antigen Negative Negative     Acceptable Yes    POCT Influenza A/B MOLECULAR   Result Value Ref Range    POC Molecular Influenza A Ag Negative Negative, Not Reported    POC Molecular Influenza B Ag Negative Negative, Not Reported     Acceptable Yes    POCT Strep A, Molecular    Result Value Ref Range    Molecular Strep A, POC Negative Negative     Acceptable Yes        Assessment:       1. Bacterial upper respiratory infection    2. Acute cough    3. Pharyngitis, unspecified etiology    4. Atelectasis          Plan:   Discussed with patient all pertinent information and results. Discussed patient diagnosis and plan of treatment. Patient with right infrahilar atelectasis with pain with deep inspiration/coughing and low grade temp. Coverage with doxycycline for bacterial pneumonia. Patient instructed to follow up with her PCP within the next 3-5 days or sooner if symptoms worsen or persist for repeat chest x ray.  Patient was given all follow up and ER instructions. All questions and concerns were addressed at this time. Patient expresses understanding of information and instructions, and is comfortable with plan. Patient remained stable throughout the visit and exited the room in no apparent distress.     Patient was instructed to return to clinic or go to ED immediately for any worsening or change in current symptoms.    Bacterial upper respiratory infection  -     doxycycline (VIBRAMYCIN) 100 MG Cap; Take 1 capsule (100 mg total) by mouth 2 (two) times daily. for 10 days  Dispense: 20 capsule; Refill: 0  -     Discontinue: albuterol (VENTOLIN HFA) 90 mcg/actuation inhaler; Inhale 2 puffs into the lungs every 6 (six) hours as needed for Wheezing. Rescue  Dispense: 18 g; Refill: 0  -     albuterol (VENTOLIN HFA) 90 mcg/actuation inhaler; Inhale 2 puffs into the lungs every 6 (six) hours as needed for Shortness of Breath. Rescue  Dispense: 18 g; Refill: 0  -     promethazine-dextromethorphan (PROMETHAZINE-DM) 6.25-15 mg/5 mL Syrp; Take 5 mLs by mouth nightly as needed (cough).  Dispense: 100 mL; Refill: 0    Acute cough  -     SARS Coronavirus 2 Antigen, POCT Manual Read  -     POCT Influenza A/B MOLECULAR  -     XR CHEST PA AND LATERAL; Future; Expected date: 12/30/2022  -      dextromethorphan-guaiFENesin  mg/5 ml (ROBITUSSIN-DM)  mg/5 mL liquid; Take 10 mLs by mouth every 4 (four) hours as needed (cough congestion).  Dispense: 118 mL; Refill: 0    Pharyngitis, unspecified etiology  -     POCT Strep A, Molecular    Atelectasis              Patient Instructions   Please follow up with your Primary care provider within 2-5 days if your signs and symptoms have not resolved or worsen. Follow up with your PCP in 5 days for repeat chest xray.    Deep breathe to ensure proper inflation of the lungs. Splint with a pillow if painful to deep breathe.   If your condition worsens or fails to improve we recommend that you receive another evaluation at the emergency room immediately or contact your primary medical clinic to discuss your concerns.   You must understand that you have received an Urgent Care treatment only and that you may be released before all of your medical problems are known or treated. You, the patient, will arrange for follow up care as instructed.     RED FLAGS/WARNING SYMPTOMS DISCUSSED WITH PATIENT THAT WOULD WARRANT EMERGENT MEDICAL ATTENTION. PATIENT VERBALIZED UNDERSTANDING.   Cough   If your condition worsens or fails to improve we recommend that you receive another evaluation at the ER immediately or contact your PCP to discuss your concerns or return here. You must understand that you've received an urgent care treatment only and that you may be released before all your medical problems are known or treated. You the patient will arrange for follouwp care as instructed. .  Rest and fluids are important  Can use honey with liz to soothe your throat  Take prescription cough meds (pills) as prescribed; take prescription cough syrup at night as needed for cough.  Do not take both the prescribed cough pills and syrup at the same time or within 6 hours of each other.  Do not take the cough syrup with any other sedative medication as it can can cause drowsiness.  "Do not operate any heavy machinery, drink or drive while taking the cough syrup.   -  Flonase (fluticasone) is a nasal spray which is available over the counter and may help with your symptoms.   -  If you have hypertension avoid using the "D" which is the decongestant.  Instead you can use Coricidin HBP for cold and cough symptoms.    -  If you just have drainage you can take plain Zyrtec, Claritin or Allegra   -  Tylenol or ibuprofen can also be used as directed for pain unless you have an allergy to them or medical condition such as stomach ulcers, kidney or liver disease or blood thinners etc for which you should not be taking these type of medications.   Please follow up with your primary care doctor or specialist in the next 48-72hrs as needed and if no improvement  If you  smoke, please stop smoking.               "

## 2023-01-25 ENCOUNTER — PATIENT MESSAGE (OUTPATIENT)
Dept: ADMINISTRATIVE | Facility: HOSPITAL | Age: 43
End: 2023-01-25
Payer: COMMERCIAL

## 2023-02-15 ENCOUNTER — OFFICE VISIT (OUTPATIENT)
Dept: URGENT CARE | Facility: CLINIC | Age: 43
End: 2023-02-15
Payer: COMMERCIAL

## 2023-02-15 VITALS
RESPIRATION RATE: 16 BRPM | OXYGEN SATURATION: 100 % | TEMPERATURE: 100 F | SYSTOLIC BLOOD PRESSURE: 137 MMHG | BODY MASS INDEX: 30.29 KG/M2 | DIASTOLIC BLOOD PRESSURE: 78 MMHG | WEIGHT: 193 LBS | HEIGHT: 67 IN | HEART RATE: 95 BPM

## 2023-02-15 DIAGNOSIS — R52 BODY ACHES: ICD-10-CM

## 2023-02-15 DIAGNOSIS — R09.82 ALLERGIC RHINITIS WITH POSTNASAL DRIP: Primary | ICD-10-CM

## 2023-02-15 DIAGNOSIS — J30.9 ALLERGIC RHINITIS WITH POSTNASAL DRIP: Primary | ICD-10-CM

## 2023-02-15 LAB
CTP QC/QA: YES
SARS-COV-2 AG RESP QL IA.RAPID: NEGATIVE

## 2023-02-15 PROCEDURE — 1160F RVW MEDS BY RX/DR IN RCRD: CPT | Mod: CPTII,S$GLB,, | Performed by: NURSE PRACTITIONER

## 2023-02-15 PROCEDURE — 3078F DIAST BP <80 MM HG: CPT | Mod: CPTII,S$GLB,, | Performed by: NURSE PRACTITIONER

## 2023-02-15 PROCEDURE — 87811 SARS CORONAVIRUS 2 ANTIGEN POCT, MANUAL READ: ICD-10-PCS | Mod: QW,S$GLB,, | Performed by: NURSE PRACTITIONER

## 2023-02-15 PROCEDURE — 1160F PR REVIEW ALL MEDS BY PRESCRIBER/CLIN PHARMACIST DOCUMENTED: ICD-10-PCS | Mod: CPTII,S$GLB,, | Performed by: NURSE PRACTITIONER

## 2023-02-15 PROCEDURE — 1159F PR MEDICATION LIST DOCUMENTED IN MEDICAL RECORD: ICD-10-PCS | Mod: CPTII,S$GLB,, | Performed by: NURSE PRACTITIONER

## 2023-02-15 PROCEDURE — 3078F PR MOST RECENT DIASTOLIC BLOOD PRESSURE < 80 MM HG: ICD-10-PCS | Mod: CPTII,S$GLB,, | Performed by: NURSE PRACTITIONER

## 2023-02-15 PROCEDURE — 1159F MED LIST DOCD IN RCRD: CPT | Mod: CPTII,S$GLB,, | Performed by: NURSE PRACTITIONER

## 2023-02-15 PROCEDURE — 3075F SYST BP GE 130 - 139MM HG: CPT | Mod: CPTII,S$GLB,, | Performed by: NURSE PRACTITIONER

## 2023-02-15 PROCEDURE — 87811 SARS-COV-2 COVID19 W/OPTIC: CPT | Mod: QW,S$GLB,, | Performed by: NURSE PRACTITIONER

## 2023-02-15 PROCEDURE — 99213 OFFICE O/P EST LOW 20 MIN: CPT | Mod: S$GLB,,, | Performed by: NURSE PRACTITIONER

## 2023-02-15 PROCEDURE — 3008F BODY MASS INDEX DOCD: CPT | Mod: CPTII,S$GLB,, | Performed by: NURSE PRACTITIONER

## 2023-02-15 PROCEDURE — 3075F PR MOST RECENT SYSTOLIC BLOOD PRESS GE 130-139MM HG: ICD-10-PCS | Mod: CPTII,S$GLB,, | Performed by: NURSE PRACTITIONER

## 2023-02-15 PROCEDURE — 3008F PR BODY MASS INDEX (BMI) DOCUMENTED: ICD-10-PCS | Mod: CPTII,S$GLB,, | Performed by: NURSE PRACTITIONER

## 2023-02-15 PROCEDURE — 99213 PR OFFICE/OUTPT VISIT, EST, LEVL III, 20-29 MIN: ICD-10-PCS | Mod: S$GLB,,, | Performed by: NURSE PRACTITIONER

## 2023-02-15 RX ORDER — AZELASTINE 1 MG/ML
1 SPRAY, METERED NASAL 2 TIMES DAILY
Qty: 30 ML | Refills: 0 | Status: SHIPPED | OUTPATIENT
Start: 2023-02-15 | End: 2024-02-15

## 2023-02-15 NOTE — PROGRESS NOTES
"Subjective:       Patient ID: Judie Munoz is a 42 y.o. female.    Vitals:  height is 5' 7" (1.702 m) and weight is 87.5 kg (193 lb). Her tympanic temperature is 100.3 °F (37.9 °C). Her blood pressure is 137/78 and her pulse is 95. Her respiration is 16 and oxygen saturation is 100%.     Chief Complaint: Sore Throat    Patient presents with sore throat, postnasal drip, throat clearing and body aches. Symptoms started yesterday. Patient states positive exposure to covid. Pt admits she has seasonal allergies and takes claritin daily.     Sore Throat   This is a new problem. The current episode started in the past 7 days (2). The problem has been gradually worsening. The maximum temperature recorded prior to her arrival was 101 - 101.9 F. The pain is at a severity of 6/10. Associated symptoms include congestion (nasal), a plugged ear sensation and neck pain. Pertinent negatives include no abdominal pain, coughing, diarrhea, drooling, ear discharge, ear pain, headaches, hoarse voice, shortness of breath, stridor, swollen glands, trouble swallowing or vomiting. Associated symptoms comments: Nausea  . She has had no exposure to strep or mono. She has tried NSAIDs for the symptoms.     HENT:  Positive for congestion (nasal) and sore throat. Negative for ear pain, ear discharge, drooling and trouble swallowing.    Neck: Positive for neck pain.   Respiratory:  Negative for cough, shortness of breath and stridor.    Gastrointestinal:  Negative for abdominal pain, vomiting and diarrhea.   Neurological:  Negative for headaches.     Objective:      Physical Exam   Constitutional: She is oriented to person, place, and time. She appears well-developed. She is cooperative.  Non-toxic appearance. She does not appear ill. No distress.   HENT:   Head: Normocephalic and atraumatic.   Ears:   Right Ear: Hearing, external ear and ear canal normal. Tympanic membrane is not erythematous. A middle ear effusion is present.   Left Ear: " Hearing, external ear and ear canal normal. Tympanic membrane is not erythematous. A middle ear effusion is present.   Nose: Nose normal. No mucosal edema, rhinorrhea or nasal deformity. No epistaxis. Right sinus exhibits no maxillary sinus tenderness and no frontal sinus tenderness. Left sinus exhibits no maxillary sinus tenderness and no frontal sinus tenderness.   Mouth/Throat: Uvula is midline, oropharynx is clear and moist and mucous membranes are normal. No trismus in the jaw. Normal dentition. No uvula swelling. Cobblestoning present. No oropharyngeal exudate, posterior oropharyngeal edema or posterior oropharyngeal erythema.   Eyes: Conjunctivae and lids are normal. No scleral icterus.   Neck: Trachea normal and phonation normal. Neck supple. No edema present. No erythema present. No neck rigidity present.   Cardiovascular: Normal rate, regular rhythm, normal heart sounds and normal pulses.   Pulmonary/Chest: Effort normal and breath sounds normal. No respiratory distress. She has no decreased breath sounds. She has no wheezes. She has no rhonchi.   Abdominal: Normal appearance.   Musculoskeletal: Normal range of motion.         General: No deformity. Normal range of motion.   Neurological: She is alert and oriented to person, place, and time. She exhibits normal muscle tone. Coordination normal.   Skin: Skin is warm, dry, intact, not diaphoretic and not pale.   Psychiatric: Her speech is normal and behavior is normal. Judgment and thought content normal.   Nursing note and vitals reviewed.      Assessment:       1. Allergic rhinitis with postnasal drip    2. Body aches          Plan:         Allergic rhinitis with postnasal drip  -     azelastine (ASTELIN) 137 mcg (0.1 %) nasal spray; 1 spray (137 mcg total) by Nasal route 2 (two) times daily.  Dispense: 30 mL; Refill: 0    Body aches  -     SARS Coronavirus 2 Antigen, POCT Manual Read                 Results for orders placed or performed in visit on  02/15/23   SARS Coronavirus 2 Antigen, POCT Manual Read   Result Value Ref Range    SARS Coronavirus 2 Antigen Negative Negative     Acceptable Yes      Lab result reviewed and discussed with patient.    Get plenty of rest.  Increase fluids.   May apply warm compresses as needed for facial pain and congestion.   Saline nasal spray to loosen nasal congestion.  Humidifier or steamy shower may help with congestion.  Flonase or Nasacort to reduce inflammation in the sinus cavities.  Astelin nasal spray as prescribed for allergy symptoms.   You may take an over the counter 24 hour antihistamine such as Zyrtec or Claritin for allergy symptoms such as sneezing, itchy/watery eyes, scratchy throat, or congestion.  Warm salt water gargles, Cepacol throat lozenges, or Chloraseptic spray for sore throat.  Take Tylenol or Ibuprofen as needed for sore throat, body aches, or fever.  Take an over the counter cough suppressant such as Delsym or Robitussin DM as directed on label for cough.  Follow up with your primary care provider if symptoms persist >10 days or sooner for any new or worsening symptoms.   Go to the ER for any fever that does not improve with Tylenol/Ibuprofen, neck stiffness, rash, severe headache, vision changes, shortness of breath, chest pain, facial swelling, severe facial pain, or any other new and concerning symptoms.

## 2023-02-15 NOTE — LETTER
February 15, 2023      [facility]  [address], [address]  [place] [address]  Phone: [phone]  Fax: [phone]       Patient: Judie Munoz   YOB: 1980  Date of Visit: 02/15/2023    To Whom It May Concern:    Katie Munoz  was at [facility] on 02/15/2023. The patient may return to work/school on 2/16/2023 with no restrictions. If you have any questions or concerns, or if I can be of further assistance, please do not hesitate to contact me.    Sincerely,        Tori Solares, NP

## 2023-02-15 NOTE — PATIENT INSTRUCTIONS
Get plenty of rest.  Increase fluids.   May apply warm compresses as needed for facial pain and congestion.   Saline nasal spray to loosen nasal congestion.  Humidifier or steamy shower may help with congestion.  Flonase or Nasacort to reduce inflammation in the sinus cavities.  Astelin nasal spray as prescribed for allergy symptoms.   You may take an over the counter 24 hour antihistamine such as Zyrtec or Claritin for allergy symptoms such as sneezing, itchy/watery eyes, scratchy throat, or congestion.  Warm salt water gargles, Cepacol throat lozenges, or Chloraseptic spray for sore throat.  Take Tylenol or Ibuprofen as needed for sore throat, body aches, or fever.  Take an over the counter cough suppressant such as Delsym or Robitussin DM as directed on label for cough.  Follow up with your primary care provider if symptoms persist >10 days or sooner for any new or worsening symptoms.   Go to the ER for any fever that does not improve with Tylenol/Ibuprofen, neck stiffness, rash, severe headache, vision changes, shortness of breath, chest pain, facial swelling, severe facial pain, or any other new and concerning symptoms.

## 2023-03-22 ENCOUNTER — LAB VISIT (OUTPATIENT)
Dept: LAB | Facility: HOSPITAL | Age: 43
End: 2023-03-22
Attending: NURSE PRACTITIONER
Payer: COMMERCIAL

## 2023-03-22 ENCOUNTER — OFFICE VISIT (OUTPATIENT)
Dept: OBSTETRICS AND GYNECOLOGY | Facility: CLINIC | Age: 43
End: 2023-03-22
Payer: COMMERCIAL

## 2023-03-22 VITALS
DIASTOLIC BLOOD PRESSURE: 84 MMHG | SYSTOLIC BLOOD PRESSURE: 126 MMHG | WEIGHT: 210.75 LBS | HEIGHT: 67 IN | BODY MASS INDEX: 33.08 KG/M2

## 2023-03-22 DIAGNOSIS — N89.8 VAGINAL ITCHING: ICD-10-CM

## 2023-03-22 DIAGNOSIS — Z30.432 ENCOUNTER FOR IUD REMOVAL: ICD-10-CM

## 2023-03-22 DIAGNOSIS — Z12.4 PAP SMEAR FOR CERVICAL CANCER SCREENING: Primary | ICD-10-CM

## 2023-03-22 DIAGNOSIS — Z30.014 ENCOUNTER FOR INITIAL PRESCRIPTION OF INTRAUTERINE CONTRACEPTIVE DEVICE (IUD): ICD-10-CM

## 2023-03-22 DIAGNOSIS — R30.0 DYSURIA: ICD-10-CM

## 2023-03-22 DIAGNOSIS — Z11.3 SCREENING EXAMINATION FOR STD (SEXUALLY TRANSMITTED DISEASE): ICD-10-CM

## 2023-03-22 DIAGNOSIS — Z30.011 BCP (BIRTH CONTROL PILLS) INITIATION: ICD-10-CM

## 2023-03-22 LAB — HIV 1+2 AB+HIV1 P24 AG SERPL QL IA: NORMAL

## 2023-03-22 PROCEDURE — 58301 REMOVE INTRAUTERINE DEVICE: CPT | Mod: S$GLB,,, | Performed by: NURSE PRACTITIONER

## 2023-03-22 PROCEDURE — 99204 OFFICE O/P NEW MOD 45 MIN: CPT | Mod: 25,S$GLB,, | Performed by: NURSE PRACTITIONER

## 2023-03-22 PROCEDURE — 86592 SYPHILIS TEST NON-TREP QUAL: CPT | Performed by: NURSE PRACTITIONER

## 2023-03-22 PROCEDURE — 87591 N.GONORRHOEAE DNA AMP PROB: CPT | Performed by: NURSE PRACTITIONER

## 2023-03-22 PROCEDURE — 87624 HPV HI-RISK TYP POOLED RSLT: CPT | Performed by: NURSE PRACTITIONER

## 2023-03-22 PROCEDURE — 3008F PR BODY MASS INDEX (BMI) DOCUMENTED: ICD-10-PCS | Mod: CPTII,S$GLB,, | Performed by: NURSE PRACTITIONER

## 2023-03-22 PROCEDURE — 36415 COLL VENOUS BLD VENIPUNCTURE: CPT | Performed by: NURSE PRACTITIONER

## 2023-03-22 PROCEDURE — 80074 ACUTE HEPATITIS PANEL: CPT | Performed by: NURSE PRACTITIONER

## 2023-03-22 PROCEDURE — 99999 PR PBB SHADOW E&M-EST. PATIENT-LVL IV: CPT | Mod: PBBFAC,,, | Performed by: NURSE PRACTITIONER

## 2023-03-22 PROCEDURE — 87389 HIV-1 AG W/HIV-1&-2 AB AG IA: CPT | Performed by: NURSE PRACTITIONER

## 2023-03-22 PROCEDURE — 1159F MED LIST DOCD IN RCRD: CPT | Mod: CPTII,S$GLB,, | Performed by: NURSE PRACTITIONER

## 2023-03-22 PROCEDURE — 3079F DIAST BP 80-89 MM HG: CPT | Mod: CPTII,S$GLB,, | Performed by: NURSE PRACTITIONER

## 2023-03-22 PROCEDURE — 3079F PR MOST RECENT DIASTOLIC BLOOD PRESSURE 80-89 MM HG: ICD-10-PCS | Mod: CPTII,S$GLB,, | Performed by: NURSE PRACTITIONER

## 2023-03-22 PROCEDURE — 1160F PR REVIEW ALL MEDS BY PRESCRIBER/CLIN PHARMACIST DOCUMENTED: ICD-10-PCS | Mod: CPTII,S$GLB,, | Performed by: NURSE PRACTITIONER

## 2023-03-22 PROCEDURE — 88175 CYTOPATH C/V AUTO FLUID REDO: CPT | Performed by: NURSE PRACTITIONER

## 2023-03-22 PROCEDURE — 1159F PR MEDICATION LIST DOCUMENTED IN MEDICAL RECORD: ICD-10-PCS | Mod: CPTII,S$GLB,, | Performed by: NURSE PRACTITIONER

## 2023-03-22 PROCEDURE — 87086 URINE CULTURE/COLONY COUNT: CPT | Performed by: NURSE PRACTITIONER

## 2023-03-22 PROCEDURE — 99999 PR PBB SHADOW E&M-EST. PATIENT-LVL IV: ICD-10-PCS | Mod: PBBFAC,,, | Performed by: NURSE PRACTITIONER

## 2023-03-22 PROCEDURE — 58301 REMOVAL OF IUD: ICD-10-PCS | Mod: S$GLB,,, | Performed by: NURSE PRACTITIONER

## 2023-03-22 PROCEDURE — 3074F SYST BP LT 130 MM HG: CPT | Mod: CPTII,S$GLB,, | Performed by: NURSE PRACTITIONER

## 2023-03-22 PROCEDURE — 99204 PR OFFICE/OUTPT VISIT, NEW, LEVL IV, 45-59 MIN: ICD-10-PCS | Mod: 25,S$GLB,, | Performed by: NURSE PRACTITIONER

## 2023-03-22 PROCEDURE — 1160F RVW MEDS BY RX/DR IN RCRD: CPT | Mod: CPTII,S$GLB,, | Performed by: NURSE PRACTITIONER

## 2023-03-22 PROCEDURE — 3008F BODY MASS INDEX DOCD: CPT | Mod: CPTII,S$GLB,, | Performed by: NURSE PRACTITIONER

## 2023-03-22 PROCEDURE — 3074F PR MOST RECENT SYSTOLIC BLOOD PRESSURE < 130 MM HG: ICD-10-PCS | Mod: CPTII,S$GLB,, | Performed by: NURSE PRACTITIONER

## 2023-03-22 RX ORDER — NORGESTIMATE AND ETHINYL ESTRADIOL 0.25-0.035
1 KIT ORAL DAILY
Qty: 28 TABLET | Refills: 11 | Status: SHIPPED | OUTPATIENT
Start: 2023-03-22 | End: 2023-03-23

## 2023-03-22 RX ORDER — FLUCONAZOLE 100 MG/1
TABLET ORAL
Qty: 3 TABLET | Refills: 0 | Status: SHIPPED | OUTPATIENT
Start: 2023-03-22

## 2023-03-22 NOTE — PROCEDURES
Removal of IUD    Date/Time: 3/22/2023 11:15 AM  Performed by: Precious Camarena NP  Authorized by: Precious Camarena NP     Consent obtained:  Verbal  Consent given by:  Patient  Procedure risks and benefits discussed: yes    Patient questions answered: yes    Patient agrees, verbalizes understanding, and wants to proceed: yes    Educational handouts given: yes    Instructions and paperwork completed: yes    Implant grasped by: ring forceps  Removal due to infection and inflammatory reaction: no    Removal due to mechanical complications of IUD/Nexplanon: no    Removed with no complications: yes

## 2023-03-22 NOTE — PROGRESS NOTES
eSubjective:       Patient ID: Judie Munoz is a 42 y.o. female.    Chief Complaint:  Contraception    No LMP recorded. Patient has had an implant.  History of Present Illness  Presents today for IUD removal and initiation of birth control pills. Complains of burning with urination and vaginal itching   Desires std screening   OB History    Para Term  AB Living   2 2 2         SAB IAB Ectopic Multiple Live Births                  # Outcome Date GA Lbr Henrique/2nd Weight Sex Delivery Anes PTL Lv   2 Term            1 Term                Review of Systems  Review of Systems        Objective:    Physical Exam  Genitourinary:     General: Normal vulva.      Exam position: Lithotomy position.      Vagina: No signs of injury and foreign body. No vaginal discharge, erythema, tenderness, bleeding, lesions or prolapsed vaginal walls.      Cervix: No cervical motion tenderness, discharge, friability, lesion, erythema, cervical bleeding or eversion.      Comments: IUD strings visualized         Assessment:     1. Encounter for IUD removal    2. Screening examination for STD (sexually transmitted disease)    3. Dysuria    4. Vaginal itching    5. BCP (birth control pills) initiation              Plan:   Judie was seen today for contraception.    Diagnoses and all orders for this visit:    Encounter for IUD removal  -     Removal of IUD    Screening examination for STD (sexually transmitted disease)  -     HIV 1/2 Ag/Ab (4th Gen); Future  -     RPR; Future  -     Hepatitis Panel, Acute; Future  -     C. trachomatis/N. gonorrhoeae by AMP DNA [facility]; Vagina    Dysuria  -     CULTURE, URINE    Vaginal itching  -     fluconazole (DIFLUCAN) 100 MG tablet; Take one tablet today and may repeat every three days up to 3 doses    BCP (birth control pills) initiation  -     norgestimate-ethinyl estradioL (ORTHO-CYCLEN) 0.25-35 mg-mcg per tablet; Take 1 tablet by mouth once daily.    Reviewed contraceptive options--ocp, depo,  nuva ring, nexplanon, iud, patch  Reviewed uses of each, risks and benefits.  Pt elects trial of  pills

## 2023-03-23 ENCOUNTER — PATIENT MESSAGE (OUTPATIENT)
Dept: OBSTETRICS AND GYNECOLOGY | Facility: CLINIC | Age: 43
End: 2023-03-23
Payer: COMMERCIAL

## 2023-03-23 LAB
C TRACH DNA SPEC QL NAA+PROBE: NOT DETECTED
HAV IGM SERPL QL IA: NORMAL
HBV CORE IGM SERPL QL IA: NORMAL
HBV SURFACE AG SERPL QL IA: NORMAL
HCV AB SERPL QL IA: NORMAL
N GONORRHOEA DNA SPEC QL NAA+PROBE: NOT DETECTED
RPR SER QL: NORMAL

## 2023-03-24 ENCOUNTER — PATIENT MESSAGE (OUTPATIENT)
Dept: OBSTETRICS AND GYNECOLOGY | Facility: CLINIC | Age: 43
End: 2023-03-24
Payer: COMMERCIAL

## 2023-03-24 LAB — BACTERIA UR CULT: NORMAL

## 2023-03-27 ENCOUNTER — PATIENT MESSAGE (OUTPATIENT)
Dept: OBSTETRICS AND GYNECOLOGY | Facility: CLINIC | Age: 43
End: 2023-03-27
Payer: COMMERCIAL

## 2023-03-28 ENCOUNTER — LAB VISIT (OUTPATIENT)
Dept: LAB | Facility: HOSPITAL | Age: 43
End: 2023-03-28
Payer: COMMERCIAL

## 2023-03-28 ENCOUNTER — OFFICE VISIT (OUTPATIENT)
Dept: INTERNAL MEDICINE | Facility: CLINIC | Age: 43
End: 2023-03-28
Payer: COMMERCIAL

## 2023-03-28 VITALS
SYSTOLIC BLOOD PRESSURE: 132 MMHG | TEMPERATURE: 99 F | HEIGHT: 67 IN | OXYGEN SATURATION: 99 % | DIASTOLIC BLOOD PRESSURE: 88 MMHG | WEIGHT: 209.44 LBS | BODY MASS INDEX: 32.87 KG/M2 | HEART RATE: 80 BPM

## 2023-03-28 DIAGNOSIS — I10 PRIMARY HYPERTENSION: ICD-10-CM

## 2023-03-28 DIAGNOSIS — R07.9 CHEST PAIN, UNSPECIFIED TYPE: ICD-10-CM

## 2023-03-28 DIAGNOSIS — E66.9 OBESITY (BMI 30-39.9): ICD-10-CM

## 2023-03-28 DIAGNOSIS — R07.9 CHEST PAIN, UNSPECIFIED TYPE: Primary | ICD-10-CM

## 2023-03-28 DIAGNOSIS — A59.01 TRICHOMONAS VAGINITIS: Primary | ICD-10-CM

## 2023-03-28 DIAGNOSIS — R73.03 PREDIABETES: ICD-10-CM

## 2023-03-28 DIAGNOSIS — E04.1 THYROID NODULE: ICD-10-CM

## 2023-03-28 DIAGNOSIS — G47.00 INSOMNIA, UNSPECIFIED TYPE: ICD-10-CM

## 2023-03-28 LAB
ALBUMIN SERPL BCP-MCNC: 4 G/DL (ref 3.5–5.2)
ALP SERPL-CCNC: 61 U/L (ref 55–135)
ALT SERPL W/O P-5'-P-CCNC: 12 U/L (ref 10–44)
ANION GAP SERPL CALC-SCNC: 10 MMOL/L (ref 8–16)
AST SERPL-CCNC: 12 U/L (ref 10–40)
BASOPHILS # BLD AUTO: 0.06 K/UL (ref 0–0.2)
BASOPHILS NFR BLD: 0.8 % (ref 0–1.9)
BILIRUB SERPL-MCNC: 0.8 MG/DL (ref 0.1–1)
BUN SERPL-MCNC: 12 MG/DL (ref 6–20)
CALCIUM SERPL-MCNC: 9.1 MG/DL (ref 8.7–10.5)
CHLORIDE SERPL-SCNC: 102 MMOL/L (ref 95–110)
CHOLEST SERPL-MCNC: 176 MG/DL (ref 120–199)
CHOLEST/HDLC SERPL: 3.7 {RATIO} (ref 2–5)
CO2 SERPL-SCNC: 28 MMOL/L (ref 23–29)
CREAT SERPL-MCNC: 0.8 MG/DL (ref 0.5–1.4)
DIFFERENTIAL METHOD: NORMAL
EOSINOPHIL # BLD AUTO: 0.1 K/UL (ref 0–0.5)
EOSINOPHIL NFR BLD: 1.4 % (ref 0–8)
ERYTHROCYTE [DISTWIDTH] IN BLOOD BY AUTOMATED COUNT: 13.2 % (ref 11.5–14.5)
EST. GFR  (NO RACE VARIABLE): >60 ML/MIN/1.73 M^2
ESTIMATED AVG GLUCOSE: 105 MG/DL (ref 68–131)
FINAL PATHOLOGIC DIAGNOSIS: NORMAL
GLUCOSE SERPL-MCNC: 97 MG/DL (ref 70–110)
HBA1C MFR BLD: 5.3 % (ref 4–5.6)
HCT VFR BLD AUTO: 41.2 % (ref 37–48.5)
HDLC SERPL-MCNC: 48 MG/DL (ref 40–75)
HDLC SERPL: 27.3 % (ref 20–50)
HGB BLD-MCNC: 13.6 G/DL (ref 12–16)
HPV HR 12 DNA SPEC QL NAA+PROBE: NEGATIVE
HPV16 AG SPEC QL: NEGATIVE
HPV18 DNA SPEC QL NAA+PROBE: NEGATIVE
IMM GRANULOCYTES # BLD AUTO: 0.02 K/UL (ref 0–0.04)
IMM GRANULOCYTES NFR BLD AUTO: 0.3 % (ref 0–0.5)
LDLC SERPL CALC-MCNC: 115.6 MG/DL (ref 63–159)
LYMPHOCYTES # BLD AUTO: 2.4 K/UL (ref 1–4.8)
LYMPHOCYTES NFR BLD: 30.5 % (ref 18–48)
Lab: NORMAL
MCH RBC QN AUTO: 28 PG (ref 27–31)
MCHC RBC AUTO-ENTMCNC: 33 G/DL (ref 32–36)
MCV RBC AUTO: 85 FL (ref 82–98)
MONOCYTES # BLD AUTO: 0.5 K/UL (ref 0.3–1)
MONOCYTES NFR BLD: 5.9 % (ref 4–15)
NEUTROPHILS # BLD AUTO: 4.8 K/UL (ref 1.8–7.7)
NEUTROPHILS NFR BLD: 61.1 % (ref 38–73)
NONHDLC SERPL-MCNC: 128 MG/DL
NRBC BLD-RTO: 0 /100 WBC
PLATELET # BLD AUTO: 303 K/UL (ref 150–450)
PMV BLD AUTO: 9.9 FL (ref 9.2–12.9)
POTASSIUM SERPL-SCNC: 3.5 MMOL/L (ref 3.5–5.1)
PROT SERPL-MCNC: 7.8 G/DL (ref 6–8.4)
RBC # BLD AUTO: 4.86 M/UL (ref 4–5.4)
SODIUM SERPL-SCNC: 140 MMOL/L (ref 136–145)
T4 FREE SERPL-MCNC: 0.98 NG/DL (ref 0.71–1.51)
TRIGL SERPL-MCNC: 62 MG/DL (ref 30–150)
TROPONIN I SERPL DL<=0.01 NG/ML-MCNC: <0.006 NG/ML (ref 0–0.03)
TSH SERPL DL<=0.005 MIU/L-ACNC: 1.23 UIU/ML (ref 0.4–4)
WBC # BLD AUTO: 7.91 K/UL (ref 3.9–12.7)

## 2023-03-28 PROCEDURE — 3075F SYST BP GE 130 - 139MM HG: CPT | Mod: CPTII,S$GLB,,

## 2023-03-28 PROCEDURE — 3008F BODY MASS INDEX DOCD: CPT | Mod: CPTII,S$GLB,,

## 2023-03-28 PROCEDURE — 93010 ELECTROCARDIOGRAM REPORT: CPT | Mod: S$GLB,,, | Performed by: INTERNAL MEDICINE

## 2023-03-28 PROCEDURE — 84443 ASSAY THYROID STIM HORMONE: CPT

## 2023-03-28 PROCEDURE — 1159F MED LIST DOCD IN RCRD: CPT | Mod: CPTII,S$GLB,,

## 2023-03-28 PROCEDURE — 85025 COMPLETE CBC W/AUTO DIFF WBC: CPT

## 2023-03-28 PROCEDURE — 93010 EKG 12-LEAD: ICD-10-PCS | Mod: S$GLB,,, | Performed by: INTERNAL MEDICINE

## 2023-03-28 PROCEDURE — 99999 PR PBB SHADOW E&M-EST. PATIENT-LVL V: CPT | Mod: PBBFAC,,,

## 2023-03-28 PROCEDURE — 1160F RVW MEDS BY RX/DR IN RCRD: CPT | Mod: CPTII,S$GLB,,

## 2023-03-28 PROCEDURE — 36415 COLL VENOUS BLD VENIPUNCTURE: CPT

## 2023-03-28 PROCEDURE — 80053 COMPREHEN METABOLIC PANEL: CPT

## 2023-03-28 PROCEDURE — 93005 EKG 12-LEAD: ICD-10-PCS | Mod: S$GLB,,,

## 2023-03-28 PROCEDURE — 99215 PR OFFICE/OUTPT VISIT, EST, LEVL V, 40-54 MIN: ICD-10-PCS | Mod: S$GLB,,,

## 2023-03-28 PROCEDURE — 84484 ASSAY OF TROPONIN QUANT: CPT

## 2023-03-28 PROCEDURE — 80061 LIPID PANEL: CPT

## 2023-03-28 PROCEDURE — 99999 PR PBB SHADOW E&M-EST. PATIENT-LVL V: ICD-10-PCS | Mod: PBBFAC,,,

## 2023-03-28 PROCEDURE — 1160F PR REVIEW ALL MEDS BY PRESCRIBER/CLIN PHARMACIST DOCUMENTED: ICD-10-PCS | Mod: CPTII,S$GLB,,

## 2023-03-28 PROCEDURE — 3075F PR MOST RECENT SYSTOLIC BLOOD PRESS GE 130-139MM HG: ICD-10-PCS | Mod: CPTII,S$GLB,,

## 2023-03-28 PROCEDURE — 1159F PR MEDICATION LIST DOCUMENTED IN MEDICAL RECORD: ICD-10-PCS | Mod: CPTII,S$GLB,,

## 2023-03-28 PROCEDURE — 99215 OFFICE O/P EST HI 40 MIN: CPT | Mod: S$GLB,,,

## 2023-03-28 PROCEDURE — 93005 ELECTROCARDIOGRAM TRACING: CPT | Mod: S$GLB,,,

## 2023-03-28 PROCEDURE — 3008F PR BODY MASS INDEX (BMI) DOCUMENTED: ICD-10-PCS | Mod: CPTII,S$GLB,,

## 2023-03-28 PROCEDURE — 84439 ASSAY OF FREE THYROXINE: CPT

## 2023-03-28 PROCEDURE — 83036 HEMOGLOBIN GLYCOSYLATED A1C: CPT

## 2023-03-28 PROCEDURE — 3079F DIAST BP 80-89 MM HG: CPT | Mod: CPTII,S$GLB,,

## 2023-03-28 PROCEDURE — 3079F PR MOST RECENT DIASTOLIC BLOOD PRESSURE 80-89 MM HG: ICD-10-PCS | Mod: CPTII,S$GLB,,

## 2023-03-28 RX ORDER — METRONIDAZOLE 500 MG/1
500 TABLET ORAL EVERY 12 HOURS
Qty: 14 TABLET | Refills: 0 | Status: SHIPPED | OUTPATIENT
Start: 2023-03-28 | End: 2023-04-04

## 2023-03-28 RX ORDER — TRAZODONE HYDROCHLORIDE 50 MG/1
50 TABLET ORAL NIGHTLY
Qty: 30 TABLET | Refills: 11 | Status: SHIPPED | OUTPATIENT
Start: 2023-03-28 | End: 2024-02-28 | Stop reason: SDUPTHER

## 2023-03-28 NOTE — PROGRESS NOTES
Judie Munoz  03/28/2023  [id number]    Ritika Tong MD  Patient Care Team:  Ritika Tong MD as PCP - General (Family Medicine)  Lilibeth Callejas LPN as Care Coordinator (Internal Medicine)          Visit Type:an urgent visit for a new problem    Chief Complaint:  Chief Complaint   Patient presents with    Chest Pain        History of Present Illness:    Having CP for 3 weeks  Worse at night  Tried OTC acid reflux meds. Did not help    Feels like something is sitting on her chest  Does cause SOB at times     Does have anxiety at night thinking things she has to do the next day   Sometimes she has CP at night even when she is relaxed     HTN  Controlled with Norvasc and HCTZ    History:  Past Medical History:   Diagnosis Date    Hypertension      Past Surgical History:   Procedure Laterality Date    D&C x 3       miscarriages     Family History   Problem Relation Age of Onset    Heart disease Mother         a fib    Hypertension Mother     Diabetes Father     Cancer Maternal Aunt     Diabetes Paternal Aunt     Diabetes Maternal Grandmother     Heart disease Maternal Grandmother     Kidney disease Maternal Grandmother      Social History     Socioeconomic History    Marital status: Single    Number of children: 2   Occupational History     Employer: [employer] AND [employer]   Tobacco Use    Smoking status: Never    Smokeless tobacco: Never   Substance and Sexual Activity    Alcohol use: No     Alcohol/week: 0.0 standard drinks    Drug use: No    Sexual activity: Not Currently     Partners: Male     Social Determinants of Health     Financial Resource Strain: Low Risk     Difficulty of Paying Living Expenses: Not very hard   Food Insecurity: Food Insecurity Present    Worried About Running Out of Food in the Last Year: Sometimes true    Ran Out of Food in the Last Year: Never true   Transportation Needs: No Transportation Needs    Lack of Transportation (Medical): No    Lack of Transportation  (Non-Medical): No   Physical Activity: Insufficiently Active    Days of Exercise per Week: 3 days    Minutes of Exercise per Session: 40 min   Stress: Stress Concern Present    Feeling of Stress : Very much   Social Connections: Unknown    Frequency of Communication with Friends and Family: More than three times a week    Frequency of Social Gatherings with Friends and Family: Twice a week    Active Member of Clubs or Organizations: Yes    Attends Club or Organization Meetings: More than 4 times per year    Marital Status: Never    Housing Stability: Low Risk     Unable to Pay for Housing in the Last Year: No    Number of Places Lived in the Last Year: 1    Unstable Housing in the Last Year: No     Patient Active Problem List   Diagnosis    Hypertension    Migraines    Thyroid nodule     Review of patient's allergies indicates:  No Known Allergies    The following were reviewed at this visit: active problem list, medication list, allergies, family history, social history, and health maintenance.    Medications:  Current Outpatient Medications on File Prior to Visit   Medication Sig Dispense Refill    albuterol (VENTOLIN HFA) 90 mcg/actuation inhaler Inhale 2 puffs into the lungs every 6 (six) hours as needed for Shortness of Breath. Rescue 18 g 0    amLODIPine (NORVASC) 5 MG tablet Take 1 tablet (5 mg total) by mouth once daily. 90 tablet 0    amoxicillin-clavulanate 875-125mg (AUGMENTIN) 875-125 mg per tablet Take 1 tablet by mouth every 12 (twelve) hours. 20 tablet 0    azelastine (ASTELIN) 137 mcg (0.1 %) nasal spray 1 spray (137 mcg total) by Nasal route 2 (two) times daily. 30 mL 0    butalbital-acetaminophen-caffeine -40 mg (FIORICET, ESGIC) -40 mg per tablet Take 1 tablet by mouth every 8 (eight) hours as needed for Headaches. 30 tablet 1    diethylpropion (TENUATE) 75 mg SR tablet Take 75 mg by mouth once daily.      fluconazole (DIFLUCAN) 100 MG tablet Take one tablet today and may repeat  every three days up to 3 doses 3 tablet 0    fluticasone propionate (FLONASE) 50 mcg/actuation nasal spray 2 sprays (100 mcg total) by Each Nostril route once daily. 16 g 1    hydroCHLOROthiazide (HYDRODIURIL) 25 MG tablet Take 1 tablet (25 mg total) by mouth once daily. 90 tablet 0    hydrocortisone (ANUSOL-HC) 2.5 % rectal cream Place rectally 2 (two) times daily. 28 g 0    ketoconazole (NIZORAL) 2 % cream Apply topically 3 (three) times daily. 30 g 0    nystatin (MYCOSTATIN) cream Apply topically 2 (two) times daily. 15 g 0    ondansetron (ZOFRAN-ODT) 4 MG TbDL Take 2 tablets (8 mg total) by mouth every 6 (six) hours as needed (nausea). 20 tablet 0    ondansetron (ZOFRAN-ODT) 4 MG TbDL Take 2 tablets (8 mg total) by mouth every 6 (six) hours as needed (prn). 15 tablet 0    phentermine (ADIPEX-P) 37.5 mg tablet Take 37.5 mg by mouth before breakfast. Patient taking half      promethazine-dextromethorphan (PROMETHAZINE-DM) 6.25-15 mg/5 mL Syrp Take 5 mLs by mouth nightly as needed (cough). 100 mL 0    topiramate (TOPAMAX) 25 MG tablet Take 1 tablet (25 mg total) by mouth 2 (two) times daily. 60 tablet 11    ZOLMitriptan (ZOMIG-ZMT) 5 MG disintegrating tablet Take 1 tablet (5 mg total) by mouth as needed for Migraine. 18 tablet 1     No current facility-administered medications on file prior to visit.       Medications have been reviewed and reconciled with patient at this visit.  Barriers to medications reviewed with patient.    Adverse reactions to current medications reviewed with patient..    Over the counter medications reviewed and reconciled with patient.    Exam:  Wt Readings from Last 3 Encounters:   03/22/23 95.6 kg (210 lb 12.2 oz)   02/15/23 87.5 kg (193 lb)   12/30/22 87.5 kg (193 lb)     Temp Readings from Last 3 Encounters:   02/15/23 100.3 °F (37.9 °C) (Tympanic)   12/30/22 99.6 °F (37.6 °C) (Tympanic)   09/12/22 98.8 °F (37.1 °C) (Temporal)     BP Readings from Last 3 Encounters:   03/22/23 126/84    02/15/23 137/78   12/30/22 129/62     Pulse Readings from Last 3 Encounters:   02/15/23 95   12/30/22 81   09/12/22 (!) 112     There is no height or weight on file to calculate BMI.      Review of Systems   Eyes:  Negative for blurred vision and double vision.   Respiratory:  Positive for shortness of breath.    Cardiovascular:  Positive for chest pain.   Neurological:  Negative for dizziness and headaches.   Psychiatric/Behavioral:  The patient is nervous/anxious and has insomnia.    Physical Exam  Vitals and nursing note reviewed.   Constitutional:       General: She is not in acute distress.     Appearance: She is well-developed. She is obese. She is not diaphoretic.   HENT:      Head: Normocephalic and atraumatic.      Right Ear: External ear normal.      Left Ear: External ear normal.   Eyes:      General:         Right eye: No discharge.         Left eye: No discharge.      Conjunctiva/sclera: Conjunctivae normal.      Pupils: Pupils are equal, round, and reactive to light.   Cardiovascular:      Rate and Rhythm: Normal rate and regular rhythm.      Heart sounds: Normal heart sounds. No murmur heard.  Pulmonary:      Effort: Pulmonary effort is normal. No respiratory distress.      Breath sounds: Normal breath sounds. No wheezing.   Neurological:      Mental Status: She is alert and oriented to person, place, and time.   Psychiatric:         Behavior: Behavior normal.         Thought Content: Thought content normal.         Judgment: Judgment normal.       Laboratory Reviewed ({Yes)  Lab Results   Component Value Date    WBC 9.38 04/24/2017    HGB 13.1 04/24/2017    HCT 40.5 04/24/2017     04/24/2017    CHOL 164 02/03/2020    TRIG 57 02/03/2020    HDL 45 02/03/2020    ALT 15 02/03/2020    AST 19 02/03/2020     02/03/2020    K 3.3 (L) 02/03/2020     02/03/2020    CREATININE 0.8 02/03/2020    BUN 10 02/03/2020    CO2 29 02/03/2020    TSH 1.099 04/24/2017    HGBA1C 5.8 (H) 07/22/2020      Judie was seen today for chest pain.    Diagnoses and all orders for this visit:    Chest pain, unspecified type  -     IN OFFICE EKG 12-LEAD (to [place])  -     Ambulatory referral/consult to Cardiology; Future  -     Troponin I; Future    Obesity (BMI 30-39.9)  -     CBC Auto Differential; Future  -     TSH; Future    Primary hypertension  -     IN OFFICE EKG 12-LEAD (to [place])  -     Ambulatory referral/consult to Cardiology; Future  -     Lipid Panel; Future  -     Comprehensive Metabolic Panel; Future    Prediabetes  -     HEMOGLOBIN A1C; Future    Thyroid nodule  -     TSH; Future  -     US Soft Tissue Head Neck Thyroid; Future  -     T4, FREE; Future    Insomnia, unspecified type  -     traZODone (DESYREL) 50 MG tablet; Take 1 tablet (50 mg total) by mouth every evening.      Cards  EKG showe NSR with SA   Incomplete RBBB  New finding from last EKG in 2020  Labs today  Refer to cards  Cont current BP meds     Care Plan/Goals: Reviewed    Goals         Blood Pressure < 130/80 (pt-stated)       Exercise at least 150 minutes per week.       Keep up the great work with getting 30 min of exercise most days per week!         Maintain a low sodium diet       Aim for less than 2000 mg sodium per day and look for items with less than 140 mg sodium per serving        Take at least one BP reading per week at various times of the day             Follow up: No follow-ups on file.    After visit summary was printed and given to patient upon discharge today.  Patient goals and care plan are included in After Visit Summary.

## 2023-03-30 ENCOUNTER — HOSPITAL ENCOUNTER (OUTPATIENT)
Dept: RADIOLOGY | Facility: HOSPITAL | Age: 43
Discharge: HOME | End: 2023-03-30
Payer: COMMERCIAL

## 2023-03-30 ENCOUNTER — PROCEDURE VISIT (OUTPATIENT)
Dept: OBSTETRICS AND GYNECOLOGY | Facility: CLINIC | Age: 43
End: 2023-03-30
Payer: COMMERCIAL

## 2023-03-30 VITALS
HEIGHT: 67 IN | SYSTOLIC BLOOD PRESSURE: 118 MMHG | DIASTOLIC BLOOD PRESSURE: 84 MMHG | BODY MASS INDEX: 33.05 KG/M2 | WEIGHT: 210.56 LBS

## 2023-03-30 DIAGNOSIS — Z30.430 ENCOUNTER FOR INSERTION OF MIRENA IUD: Primary | ICD-10-CM

## 2023-03-30 DIAGNOSIS — E04.1 THYROID NODULE: ICD-10-CM

## 2023-03-30 PROCEDURE — 76536 US EXAM OF HEAD AND NECK: CPT | Mod: 26,,, | Performed by: RADIOLOGY

## 2023-03-30 PROCEDURE — 81025 PR  URINE PREGNANCY TEST: ICD-10-PCS | Mod: S$GLB,,, | Performed by: NURSE PRACTITIONER

## 2023-03-30 PROCEDURE — 76536 US EXAM OF HEAD AND NECK: CPT | Mod: TC

## 2023-03-30 PROCEDURE — 81025 URINE PREGNANCY TEST: CPT | Mod: S$GLB,,, | Performed by: NURSE PRACTITIONER

## 2023-03-30 PROCEDURE — 58300 INSERTION OF IUD: ICD-10-PCS | Mod: S$GLB,,, | Performed by: NURSE PRACTITIONER

## 2023-03-30 PROCEDURE — 58300 INSERT INTRAUTERINE DEVICE: CPT | Mod: S$GLB,,, | Performed by: NURSE PRACTITIONER

## 2023-03-30 PROCEDURE — 76536 US SOFT TISSUE HEAD NECK THYROID: ICD-10-PCS | Mod: 26,,, | Performed by: RADIOLOGY

## 2023-03-30 NOTE — PROCEDURES
Insertion of IUD    Date/Time: 3/30/2023 1:30 PM  Performed by: Precious Camarena NP  Authorized by: Precious Camarena NP     Consent:     Consent obtained:  Verbal and written    Consent given by:  Patient    Procedure risks and benefits discussed: yes      Patient questions answered: yes      Patient agrees, verbalizes understanding, and wants to proceed: yes      Educational handouts given: yes      Instructions and paperwork completed: yes    Procedure:     Pelvic exam performed: yes      Negative GC/chlamydia test: no      Negative urine pregnancy test: yes      Negative serum pregnancy test: no      Cervix cleaned and prepped: yes      Speculum placed in vagina: yes      Tenaculum applied to cervix: yes      Uterus sounded: yes      Uterus sound depth (cm):  8    IUD inserted with no complications: yes      Strings trimmed: yes    1 Intra Uterine Device levonorgestreL 21 mcg/24 hours (8 yrs) 52 mg     Post-procedure:     Patient tolerated procedure well: yes      Patient will follow up after next period: yes

## 2023-05-01 ENCOUNTER — PATIENT MESSAGE (OUTPATIENT)
Dept: OBSTETRICS AND GYNECOLOGY | Facility: CLINIC | Age: 43
End: 2023-05-01
Payer: COMMERCIAL

## 2023-05-10 ENCOUNTER — PATIENT MESSAGE (OUTPATIENT)
Dept: OBSTETRICS AND GYNECOLOGY | Facility: CLINIC | Age: 43
End: 2023-05-10
Payer: COMMERCIAL

## 2023-09-17 DIAGNOSIS — I10 ESSENTIAL HYPERTENSION: ICD-10-CM

## 2023-09-17 DIAGNOSIS — I10 HYPERTENSION GOAL BP (BLOOD PRESSURE) < 140/90: ICD-10-CM

## 2023-09-17 NOTE — TELEPHONE ENCOUNTER
Care Due:                  Date            Visit Type   Department     Provider  --------------------------------------------------------------------------------                                MYCHART                              FOLLOWUP/OF  [facility] INTERNAL  Last Visit: 05-      FICE VISIT   [facility]       Ritika Tong  Next Visit: None Scheduled  None         None Found                                                            Last  Test          Frequency    Reason                     Performed    Due Date  --------------------------------------------------------------------------------    Office Visit  12 months..  amLODIPine,                05- 05-                             hydroCHLOROthiazide......    [employer] Embedded Care Due Messages. Reference number: [id number].   9/17/2023 3:18:57 PM CDT

## 2023-09-19 RX ORDER — AMLODIPINE BESYLATE 5 MG/1
5 TABLET ORAL DAILY
Qty: 90 TABLET | Refills: 0 | Status: SHIPPED | OUTPATIENT
Start: 2023-09-19 | End: 2024-02-28 | Stop reason: SDUPTHER

## 2023-09-19 RX ORDER — HYDROCHLOROTHIAZIDE 25 MG/1
25 TABLET ORAL DAILY
Qty: 90 TABLET | Refills: 0 | Status: SHIPPED | OUTPATIENT
Start: 2023-09-19 | End: 2024-02-28 | Stop reason: SDUPTHER

## 2023-09-19 NOTE — TELEPHONE ENCOUNTER
Refill Routing Note   Medication(s) are not appropriate for processing by [facility] for the following reason(s):      Patient not seen by PCP within 15 months    [facility] action(s):  Route Care Due:  Appointment due          Appointments  past 12m or future 3m with PCP    Date Provider   Last Visit   5/11/2022 Ritika Tong MD   Next Visit   Visit date not found Ritika Tong MD   ED visits in past 90 days: 0        Note composed:1:57 AM 09/19/2023

## 2023-11-22 DIAGNOSIS — Z12.31 OTHER SCREENING MAMMOGRAM: ICD-10-CM

## 2024-02-07 ENCOUNTER — OFFICE VISIT (OUTPATIENT)
Dept: URGENT CARE | Facility: CLINIC | Age: 44
End: 2024-02-07
Payer: COMMERCIAL

## 2024-02-07 VITALS
TEMPERATURE: 97 F | DIASTOLIC BLOOD PRESSURE: 73 MMHG | OXYGEN SATURATION: 100 % | BODY MASS INDEX: 30.61 KG/M2 | HEART RATE: 84 BPM | RESPIRATION RATE: 18 BRPM | HEIGHT: 67 IN | WEIGHT: 195 LBS | SYSTOLIC BLOOD PRESSURE: 116 MMHG

## 2024-02-07 DIAGNOSIS — R09.82 POST-NASAL DRIP: ICD-10-CM

## 2024-02-07 DIAGNOSIS — R09.81 NASAL CONGESTION: ICD-10-CM

## 2024-02-07 DIAGNOSIS — B34.9 VIRAL ILLNESS: Primary | ICD-10-CM

## 2024-02-07 DIAGNOSIS — R52 BODY ACHES: ICD-10-CM

## 2024-02-07 DIAGNOSIS — R68.83 CHILLS: ICD-10-CM

## 2024-02-07 DIAGNOSIS — J34.9 SINUS PROBLEM: ICD-10-CM

## 2024-02-07 DIAGNOSIS — J02.9 SORE THROAT: ICD-10-CM

## 2024-02-07 PROCEDURE — 99213 OFFICE O/P EST LOW 20 MIN: CPT | Mod: S$GLB,,, | Performed by: NURSE PRACTITIONER

## 2024-02-07 PROCEDURE — 87502 INFLUENZA DNA AMP PROBE: CPT | Mod: QW,S$GLB,, | Performed by: NURSE PRACTITIONER

## 2024-02-07 PROCEDURE — 87811 SARS-COV-2 COVID19 W/OPTIC: CPT | Mod: QW,S$GLB,, | Performed by: NURSE PRACTITIONER

## 2024-02-07 PROCEDURE — 87651 STREP A DNA AMP PROBE: CPT | Mod: QW,S$GLB,, | Performed by: NURSE PRACTITIONER

## 2024-02-07 NOTE — PATIENT INSTRUCTIONS
Rest  Hydration/increase fluids  Warm salt water gargles  Warm tea with lemon and honey  Chloraseptic spray OTC as directed for sore throat  Vitamin C and Zinc OTC as directed for immune support  Claritin D OTC as directed for nasal congestion  Alternate Tylenol and Ibuprofen OTC as directed for body aches  Typical course and duration of illness discussed  Signs and symptoms of worsening discussed  Follow up as needed/with worsening

## 2024-02-07 NOTE — PROGRESS NOTES
"Subjective:      Patient ID: Judie Munoz is a 43 y.o. female.    Vitals:  height is 5' 7" (1.702 m) and weight is 88.5 kg (195 lb). Her tympanic temperature is 97.3 °F (36.3 °C). Her blood pressure is 116/73 and her pulse is 84. Her respiration is 18 and oxygen saturation is 100%.     Chief Complaint: Sinus Problem    43 year old female presents for evaluation of chest congestion, cough, post nasal drip, sore throat, generalized body aches, and chills x 2 days. Symptom onset Monday, woke up Monday morning with symptoms. Positive sick contact with Flu. OTC Dayquil without relief.    Sinus Problem  This is a new problem. The current episode started yesterday. The problem has been gradually worsening since onset. Her pain is at a severity of 8/10. The pain is moderate. Associated symptoms include chills, congestion, coughing, diaphoresis, headaches and a sore throat. Pertinent negatives include no ear pain, shortness of breath, sinus pressure or sneezing. Past treatments include oral decongestants. The treatment provided no relief.       Constitution: Positive for appetite change, chills, sweating, fatigue and fever (subjective).   HENT:  Positive for congestion, postnasal drip and sore throat. Negative for ear pain, sinus pain and sinus pressure.    Neck: neck negative.   Cardiovascular: Negative.    Eyes: Negative.    Respiratory:  Positive for cough. Negative for sputum production, shortness of breath and wheezing.    Gastrointestinal:  Positive for nausea. Negative for vomiting and diarrhea.   Endocrine: negative.   Genitourinary: Negative.    Musculoskeletal:  Positive for muscle ache.   Skin: Negative.    Allergic/Immunologic: Negative.  Negative for sneezing.   Neurological:  Positive for headaches.   Hematologic/Lymphatic: Negative.    Psychiatric/Behavioral: Negative.        Objective:     Physical Exam   Constitutional: She is oriented to person, place, and time. She appears well-developed. She is " cooperative.  Non-toxic appearance. She appears ill. No distress. normalawake  HENT:   Head: Normocephalic and atraumatic.   Ears:   Right Ear: Hearing, tympanic membrane, external ear and ear canal normal. No cerumen not present. Tympanic membrane is not injected, not scarred, not perforated, not erythematous, not retracted and not bulging. impacted cerumen  Left Ear: Hearing, tympanic membrane, external ear and ear canal normal. No cerumen not present. Tympanic membrane is not injected, not scarred, not perforated, not erythematous, not retracted and not bulging. impacted cerumen  Nose: Mucosal edema and congestion present. No rhinorrhea or nasal deformity. No epistaxis. Right sinus exhibits no maxillary sinus tenderness and no frontal sinus tenderness. Left sinus exhibits no maxillary sinus tenderness and no frontal sinus tenderness.   Mouth/Throat: Uvula is midline and mucous membranes are normal. Mucous membranes are moist. No trismus in the jaw. Normal dentition. No uvula swelling. Posterior oropharyngeal erythema present. No oropharyngeal exudate or posterior oropharyngeal edema. Tonsils are 1+ on the right. Tonsils are 1+ on the left. No tonsillar exudate.   Eyes: Conjunctivae and lids are normal. Pupils are equal, round, and reactive to light. Right eye exhibits no discharge. Left eye exhibits no discharge. No scleral icterus. Extraocular movement intact   Neck: Trachea normal and phonation normal. Neck supple. No edema present. No erythema present. No neck rigidity present.   Cardiovascular: Normal rate, regular rhythm, normal heart sounds and normal pulses.   Pulmonary/Chest: Effort normal and breath sounds normal. No stridor. No respiratory distress. She has no decreased breath sounds. She has no wheezes. She has no rhonchi. She has no rales. She exhibits no tenderness.   Abdominal: Normal appearance.   Musculoskeletal: Normal range of motion.         General: No deformity. Normal range of motion.    Neurological: no focal deficit. She is alert and oriented to person, place, and time. She exhibits normal muscle tone. Coordination normal.   Skin: Skin is warm, dry, intact, not diaphoretic and not pale.   Psychiatric: Her speech is normal and behavior is normal. Mood, judgment and thought content normal.   Nursing note and vitals reviewed.    Results for orders placed or performed in visit on 02/07/24   SARS Coronavirus 2 Antigen, POCT Manual Read   Result Value Ref Range    SARS Coronavirus 2 Antigen Negative Negative     Acceptable Yes    POCT Influenza A/B MOLECULAR   Result Value Ref Range    POC Molecular Influenza A Ag Negative Negative, Not Reported    POC Molecular Influenza B Ag Negative Negative, Not Reported     Acceptable Yes    POCT Strep A, Molecular   Result Value Ref Range    Molecular Strep A, POC Negative Negative     Acceptable Yes        Assessment:     1. Viral illness    2. Sinus problem    3. Body aches    4. Sore throat    5. Post-nasal drip    6. Chills    7. Nasal congestion        Plan:       Viral illness    Sinus problem  -     SARS Coronavirus 2 Antigen, POCT Manual Read  -     POCT Influenza A/B MOLECULAR    Body aches    Sore throat  -     POCT Strep A, Molecular    Post-nasal drip    Chills    Nasal congestion      Patient presents with symptoms that are consistent with acute viral illness. Decision to perform Strep/Covid/Flu swabs to rule out infection, (-) results discussed. Exam negative for otitis media/bacterial tonsillitis/bacterial sinusitis/bronchitis/pneumonia. Plan is to manage symptoms and prevent worsening. Discussed with patient who verbalizes understanding.           Patient Instructions   Rest  Hydration/increase fluids  Warm salt water gargles  Warm tea with lemon and honey  Chloraseptic spray OTC as directed for sore throat  Vitamin C and Zinc OTC as directed for immune support  Claritin D OTC as directed for nasal  congestion  Alternate Tylenol and Ibuprofen OTC as directed for body aches  Typical course and duration of illness discussed  Signs and symptoms of worsening discussed  Follow up as needed/with worsening

## 2024-02-28 DIAGNOSIS — I10 HYPERTENSION GOAL BP (BLOOD PRESSURE) < 140/90: ICD-10-CM

## 2024-02-28 DIAGNOSIS — G47.00 INSOMNIA, UNSPECIFIED TYPE: ICD-10-CM

## 2024-02-28 DIAGNOSIS — I10 ESSENTIAL HYPERTENSION: ICD-10-CM

## 2024-02-28 RX ORDER — TRAZODONE HYDROCHLORIDE 50 MG/1
50 TABLET ORAL NIGHTLY
Qty: 90 TABLET | Refills: 0 | Status: SHIPPED | OUTPATIENT
Start: 2024-02-28

## 2024-02-28 NOTE — TELEPHONE ENCOUNTER
Refill Routing Note   Medication(s) are not appropriate for processing by [facility] for the following reason(s):        Non-participating provider    [facility] action(s):  Route               Appointments  past 12m or future 3m with PCP    Date Provider   Last Visit   3/28/2023 Kavita Guerrero, NP   Next Visit   Visit date not found Kavita Guerrero, NP   ED visits in past 90 days: 0        Note composed:4:42 PM 02/28/2024

## 2024-02-28 NOTE — TELEPHONE ENCOUNTER
Care Due:                  Date            Visit Type   Department     Provider  --------------------------------------------------------------------------------                                MYCHART                              FOLLOWUP/OF  [facility] INTERNAL  Last Visit: 05-      FICE VISIT   [facility]       Ritika Tong  Next Visit: None Scheduled  None         None Found                                                            Last  Test          Frequency    Reason                     Performed    Due Date  --------------------------------------------------------------------------------    Office Visit  15 months..  amLODIPine,                05- 08-                             hydroCHLOROthiazide......    CMP.........  12 months..  hydroCHLOROthiazide......  03- 03-    [facility] Embedded Care Due Messages. Reference number: [id number].   2/28/2024 2:34:14 PM CST

## 2024-02-28 NOTE — TELEPHONE ENCOUNTER
No care due was identified.  [facility] Embedded Care Due Messages. Reference number: [id number].   2/28/2024 4:29:35 PM CST

## 2024-02-29 RX ORDER — AMLODIPINE BESYLATE 5 MG/1
5 TABLET ORAL DAILY
Qty: 90 TABLET | Refills: 0 | Status: SHIPPED | OUTPATIENT
Start: 2024-02-29

## 2024-02-29 RX ORDER — HYDROCHLOROTHIAZIDE 25 MG/1
25 TABLET ORAL DAILY
Qty: 90 TABLET | Refills: 0 | Status: SHIPPED | OUTPATIENT
Start: 2024-02-29

## 2024-02-29 RX ORDER — BUTALBITAL, ACETAMINOPHEN AND CAFFEINE 50; 325; 40 MG/1; MG/1; MG/1
1 TABLET ORAL EVERY 8 HOURS PRN
Qty: 30 TABLET | Refills: 0 | Status: SHIPPED | OUTPATIENT
Start: 2024-02-29

## 2024-02-29 NOTE — TELEPHONE ENCOUNTER
Refill Routing Note   Medication(s) are not appropriate for processing by [facility] for the following reason(s):        Outside of protocol  Patient not seen by provider within 15 months    [facility] action(s):  Defer  Route   Requires appointment : Yes     Requires labs : Yes             Appointments  past 12m or future 3m with PCP    Date Provider   Last Visit   5/11/2022 Ritika Tong MD   Next Visit   Visit date not found Ritika Tong MD   ED visits in past 90 days: 0        Note composed:8:29 PM 02/28/2024

## 2024-03-05 ENCOUNTER — OFFICE VISIT (OUTPATIENT)
Dept: OBSTETRICS AND GYNECOLOGY | Facility: CLINIC | Age: 44
End: 2024-03-05
Payer: COMMERCIAL

## 2024-03-05 ENCOUNTER — LAB VISIT (OUTPATIENT)
Dept: LAB | Facility: HOSPITAL | Age: 44
End: 2024-03-05
Attending: NURSE PRACTITIONER
Payer: COMMERCIAL

## 2024-03-05 VITALS
HEIGHT: 67 IN | DIASTOLIC BLOOD PRESSURE: 68 MMHG | WEIGHT: 204.56 LBS | BODY MASS INDEX: 32.11 KG/M2 | SYSTOLIC BLOOD PRESSURE: 112 MMHG

## 2024-03-05 DIAGNOSIS — Z11.3 SCREENING EXAMINATION FOR STD (SEXUALLY TRANSMITTED DISEASE): ICD-10-CM

## 2024-03-05 DIAGNOSIS — N92.6 IRREGULAR BLEEDING: ICD-10-CM

## 2024-03-05 DIAGNOSIS — Z30.431 IUD CHECK UP: ICD-10-CM

## 2024-03-05 DIAGNOSIS — R10.2 PELVIC PAIN: Primary | ICD-10-CM

## 2024-03-05 DIAGNOSIS — Z12.31 BREAST CANCER SCREENING BY MAMMOGRAM: ICD-10-CM

## 2024-03-05 LAB
B-HCG UR QL: NEGATIVE
BASOPHILS # BLD AUTO: 0.04 K/UL (ref 0–0.2)
BASOPHILS NFR BLD: 0.5 % (ref 0–1.9)
CTP QC/QA: YES
DIFFERENTIAL METHOD BLD: NORMAL
EOSINOPHIL # BLD AUTO: 0.1 K/UL (ref 0–0.5)
EOSINOPHIL NFR BLD: 1.2 % (ref 0–8)
ERYTHROCYTE [DISTWIDTH] IN BLOOD BY AUTOMATED COUNT: 12.9 % (ref 11.5–14.5)
HCT VFR BLD AUTO: 39.2 % (ref 37–48.5)
HGB BLD-MCNC: 12.9 G/DL (ref 12–16)
IMM GRANULOCYTES # BLD AUTO: 0.02 K/UL (ref 0–0.04)
IMM GRANULOCYTES NFR BLD AUTO: 0.2 % (ref 0–0.5)
LYMPHOCYTES # BLD AUTO: 2.9 K/UL (ref 1–4.8)
LYMPHOCYTES NFR BLD: 35.4 % (ref 18–48)
MCH RBC QN AUTO: 28.3 PG (ref 27–31)
MCHC RBC AUTO-ENTMCNC: 32.9 G/DL (ref 32–36)
MCV RBC AUTO: 86 FL (ref 82–98)
MONOCYTES # BLD AUTO: 0.8 K/UL (ref 0.3–1)
MONOCYTES NFR BLD: 9.6 % (ref 4–15)
NEUTROPHILS # BLD AUTO: 4.3 K/UL (ref 1.8–7.7)
NEUTROPHILS NFR BLD: 53.1 % (ref 38–73)
NRBC BLD-RTO: 0 /100 WBC
PLATELET # BLD AUTO: 315 K/UL (ref 150–450)
PMV BLD AUTO: 11 FL (ref 9.2–12.9)
RBC # BLD AUTO: 4.56 M/UL (ref 4–5.4)
TSH SERPL DL<=0.005 MIU/L-ACNC: 2.2 UIU/ML (ref 0.4–4)
WBC # BLD AUTO: 8.13 K/UL (ref 3.9–12.7)

## 2024-03-05 PROCEDURE — 36415 COLL VENOUS BLD VENIPUNCTURE: CPT | Performed by: NURSE PRACTITIONER

## 2024-03-05 PROCEDURE — 99213 OFFICE O/P EST LOW 20 MIN: CPT | Mod: S$GLB,,, | Performed by: NURSE PRACTITIONER

## 2024-03-05 PROCEDURE — 87491 CHLMYD TRACH DNA AMP PROBE: CPT | Performed by: NURSE PRACTITIONER

## 2024-03-05 PROCEDURE — 3008F BODY MASS INDEX DOCD: CPT | Mod: CPTII,S$GLB,, | Performed by: NURSE PRACTITIONER

## 2024-03-05 PROCEDURE — 1160F RVW MEDS BY RX/DR IN RCRD: CPT | Mod: CPTII,S$GLB,, | Performed by: NURSE PRACTITIONER

## 2024-03-05 PROCEDURE — 99999 PR PBB SHADOW E&M-EST. PATIENT-LVL IV: CPT | Mod: PBBFAC,,, | Performed by: NURSE PRACTITIONER

## 2024-03-05 PROCEDURE — 86592 SYPHILIS TEST NON-TREP QUAL: CPT | Performed by: NURSE PRACTITIONER

## 2024-03-05 PROCEDURE — 3074F SYST BP LT 130 MM HG: CPT | Mod: CPTII,S$GLB,, | Performed by: NURSE PRACTITIONER

## 2024-03-05 PROCEDURE — 87086 URINE CULTURE/COLONY COUNT: CPT | Performed by: NURSE PRACTITIONER

## 2024-03-05 PROCEDURE — 87210 SMEAR WET MOUNT SALINE/INK: CPT | Mod: QW,S$GLB,, | Performed by: NURSE PRACTITIONER

## 2024-03-05 PROCEDURE — 80074 ACUTE HEPATITIS PANEL: CPT | Performed by: NURSE PRACTITIONER

## 2024-03-05 PROCEDURE — 84443 ASSAY THYROID STIM HORMONE: CPT | Performed by: NURSE PRACTITIONER

## 2024-03-05 PROCEDURE — 87389 HIV-1 AG W/HIV-1&-2 AB AG IA: CPT | Performed by: NURSE PRACTITIONER

## 2024-03-05 PROCEDURE — 81025 URINE PREGNANCY TEST: CPT | Mod: S$GLB,,, | Performed by: NURSE PRACTITIONER

## 2024-03-05 PROCEDURE — 3078F DIAST BP <80 MM HG: CPT | Mod: CPTII,S$GLB,, | Performed by: NURSE PRACTITIONER

## 2024-03-05 PROCEDURE — 85025 COMPLETE CBC W/AUTO DIFF WBC: CPT | Performed by: NURSE PRACTITIONER

## 2024-03-05 PROCEDURE — 1159F MED LIST DOCD IN RCRD: CPT | Mod: CPTII,S$GLB,, | Performed by: NURSE PRACTITIONER

## 2024-03-05 NOTE — PROGRESS NOTES
Subjective:       Patient ID: Judie Munoz is a 43 y.o. female.    Chief Complaint:  Annual Exam    Patient's last menstrual period was 2024 (exact date).  History of Present Illness  No itching no burning   Over the past two weeks random sharp pains and pelvic pressure that last all throughout the day   Normally has constipation   Desires std screening   States that she had two cycles this month  Trich rescreen due today     OB History    Para Term  AB Living   2 2 2         SAB IAB Ectopic Multiple Live Births                  # Outcome Date GA Lbr Henrique/2nd Weight Sex Delivery Anes PTL Lv   2 Term            1 Term                Review of Systems  Review of Systems        Objective:    Physical Exam  Genitourinary:     General: Normal vulva.      Exam position: Lithotomy position.      Vagina: Foreign body (iud strings) present. No signs of injury. No vaginal discharge, erythema, tenderness, bleeding, lesions or prolapsed vaginal walls.      Cervix: No cervical motion tenderness, discharge, friability, lesion, erythema, cervical bleeding or eversion.      Uterus: Not deviated, not enlarged, not fixed, not tender and no uterine prolapse.       Adnexa:         Right: No mass, tenderness or fullness.          Left: No mass, tenderness or fullness.           Wet mount     Trich         neg   Assessment:     1. Pelvic pain    2. Breast cancer screening by mammogram    3. Screening examination for STD (sexually transmitted disease)    4. IUD check up    5. Irregular bleeding              Plan:   Judie was seen today for annual exam.    Diagnoses and all orders for this visit:    Pelvic pain  -     US Pelvis Complete Non OB; Future  -     Urine culture    Breast cancer screening by mammogram    Screening examination for STD (sexually transmitted disease)  -     C. trachomatis/N. gonorrhoeae by AMP DNA [facility]; Vagina  -     HIV 1/2 Ag/Ab (4th Gen); Future  -     RPR; Future  -     Hepatitis Panel,  Acute; Future  -     POCT Wet Prep    IUD check up    Irregular bleeding  -     CBC Auto Differential; Future  -     TSH; Future  -     POCT Urine Pregnancy      If all results are negative, irregular bleeding may be related to Mirena  Pelvic pain treatment pending ultrasound and urine culture results

## 2024-03-06 ENCOUNTER — PATIENT MESSAGE (OUTPATIENT)
Dept: OBSTETRICS AND GYNECOLOGY | Facility: CLINIC | Age: 44
End: 2024-03-06
Payer: COMMERCIAL

## 2024-03-06 LAB
C TRACH DNA SPEC QL NAA+PROBE: NOT DETECTED
HAV IGM SERPL QL IA: NORMAL
HBV CORE IGM SERPL QL IA: NORMAL
HBV SURFACE AG SERPL QL IA: NORMAL
HCV AB SERPL QL IA: NORMAL
HIV 1+2 AB+HIV1 P24 AG SERPL QL IA: NORMAL
N GONORRHOEA DNA SPEC QL NAA+PROBE: NOT DETECTED
RPR SER QL: NORMAL

## 2024-03-07 ENCOUNTER — HOSPITAL ENCOUNTER (OUTPATIENT)
Dept: RADIOLOGY | Facility: HOSPITAL | Age: 44
Discharge: HOME | End: 2024-03-07
Attending: NURSE PRACTITIONER
Payer: COMMERCIAL

## 2024-03-07 DIAGNOSIS — R10.2 PELVIC PAIN: ICD-10-CM

## 2024-03-07 LAB — BACTERIA UR CULT: NORMAL

## 2024-03-07 PROCEDURE — 76856 US EXAM PELVIC COMPLETE: CPT | Mod: 26,,, | Performed by: RADIOLOGY

## 2024-03-07 PROCEDURE — 76830 TRANSVAGINAL US NON-OB: CPT | Mod: TC

## 2024-03-07 PROCEDURE — 76830 TRANSVAGINAL US NON-OB: CPT | Mod: 26,,, | Performed by: RADIOLOGY

## 2024-03-12 ENCOUNTER — PATIENT MESSAGE (OUTPATIENT)
Dept: OBSTETRICS AND GYNECOLOGY | Facility: CLINIC | Age: 44
End: 2024-03-12
Payer: COMMERCIAL

## 2024-03-13 DIAGNOSIS — R10.2 PELVIC PRESSURE IN FEMALE: ICD-10-CM

## 2024-03-13 DIAGNOSIS — R35.0 FREQUENCY OF URINATION: Primary | ICD-10-CM

## 2024-03-20 ENCOUNTER — OFFICE VISIT (OUTPATIENT)
Dept: UROLOGY | Facility: CLINIC | Age: 44
End: 2024-03-20
Payer: COMMERCIAL

## 2024-03-20 VITALS
HEART RATE: 92 BPM | SYSTOLIC BLOOD PRESSURE: 141 MMHG | DIASTOLIC BLOOD PRESSURE: 86 MMHG | RESPIRATION RATE: 18 BRPM | BODY MASS INDEX: 32.01 KG/M2 | WEIGHT: 204.38 LBS

## 2024-03-20 DIAGNOSIS — R35.0 FREQUENCY OF URINATION: ICD-10-CM

## 2024-03-20 DIAGNOSIS — K92.1 BLOOD IN STOOL: Primary | ICD-10-CM

## 2024-03-20 DIAGNOSIS — R10.2 PELVIC PRESSURE IN FEMALE: ICD-10-CM

## 2024-03-20 LAB
BACTERIA #/AREA URNS AUTO: ABNORMAL /HPF
BILIRUB UR QL STRIP: NEGATIVE
CLARITY UR REFRACT.AUTO: CLEAR
COLOR UR AUTO: YELLOW
GLUCOSE UR QL STRIP: NEGATIVE
HGB UR QL STRIP: ABNORMAL
HYALINE CASTS UR QL AUTO: 3 /LPF
KETONES UR QL STRIP: NEGATIVE
LEUKOCYTE ESTERASE UR QL STRIP: NEGATIVE
MICROSCOPIC COMMENT: ABNORMAL
NITRITE UR QL STRIP: NEGATIVE
PH UR STRIP: 6 [PH] (ref 5–8)
PROT UR QL STRIP: NEGATIVE
RBC #/AREA URNS AUTO: 18 /HPF (ref 0–4)
SP GR UR STRIP: 1.02 (ref 1–1.03)
SQUAMOUS #/AREA URNS AUTO: 4 /HPF
URN SPEC COLLECT METH UR: ABNORMAL
WBC #/AREA URNS AUTO: 1 /HPF (ref 0–5)
YEAST UR QL AUTO: ABNORMAL

## 2024-03-20 PROCEDURE — 3008F BODY MASS INDEX DOCD: CPT | Mod: CPTII,S$GLB,, | Performed by: NURSE PRACTITIONER

## 2024-03-20 PROCEDURE — 1159F MED LIST DOCD IN RCRD: CPT | Mod: CPTII,S$GLB,, | Performed by: NURSE PRACTITIONER

## 2024-03-20 PROCEDURE — 3079F DIAST BP 80-89 MM HG: CPT | Mod: CPTII,S$GLB,, | Performed by: NURSE PRACTITIONER

## 2024-03-20 PROCEDURE — 3077F SYST BP >= 140 MM HG: CPT | Mod: CPTII,S$GLB,, | Performed by: NURSE PRACTITIONER

## 2024-03-20 PROCEDURE — 81001 URINALYSIS AUTO W/SCOPE: CPT | Performed by: NURSE PRACTITIONER

## 2024-03-20 PROCEDURE — 87086 URINE CULTURE/COLONY COUNT: CPT | Performed by: NURSE PRACTITIONER

## 2024-03-20 PROCEDURE — 99204 OFFICE O/P NEW MOD 45 MIN: CPT | Mod: S$GLB,,, | Performed by: NURSE PRACTITIONER

## 2024-03-20 PROCEDURE — 99999 PR PBB SHADOW E&M-EST. PATIENT-LVL V: CPT | Mod: PBBFAC,,, | Performed by: NURSE PRACTITIONER

## 2024-03-20 NOTE — PROGRESS NOTES
Chief Complaint:   Constipation  Blood in stool  Straining to void    HPI:   Patient is a 43-year-old female that is presenting as a gyn referral.  Was seen by gyn nurse practitioner with complaints of urinary frequency in the need to strain to void.  Urine culture was negative.  Patient states that over the last decade she has had to wait several minutes before the urinary stream starts.  Has a history of constipation, has not been followed by GI.  States she drinks very little water throughout the day.  Denies gross hematuria.  States that she has had blood in her stool several times over the last 6 months.  Has not had a colonoscopy.  No colon cancer in her family.  No history of renal stones.  Urine in indicates trace leukocytes and moderate blood, patient is not on her cycle, has an IUD.  Has never smoked cigarettes, no  cancers in her family.  Allergies:  Patient has no known allergies.    Medications:  has a current medication list which includes the following prescription(s): albuterol, amlodipine, azelastine, butalbital-acetaminophen-caffeine -40 mg, diethylpropion, fluconazole, fluticasone propionate, hydrochlorothiazide, ondansetron, ondansetron, trazodone, and zolmitriptan, and the following Facility-Administered Medications: levonorgestrel.    Review of Systems:  General: No fever, chills, fatigability, or weight loss.  Skin: No rashes, itching, or changes in color or texture of skin.  Chest: Denies FERRARA, cyanosis, wheezing, cough, and sputum production.  Abdomen: Appetite fine. No weight loss. Denies diarrhea, abdominal pain, hematemesis. Reports blood in stool and constipation.  Musculoskeletal: No joint stiffness or swelling. Denies back pain.  : As above.  All other review of systems negative.    PMH:   has a past medical history of Hypertension.    PSH:   has a past surgical history that includes D&C x 3 .    FamHx: family history includes Cancer in her maternal aunt; Diabetes in her  father, maternal grandmother, and paternal aunt; Heart disease in her maternal grandmother and mother; Hypertension in her mother; Kidney disease in her maternal grandmother.    SocHx:  reports that she has never smoked. She has never used smokeless tobacco. She reports that she does not drink alcohol and does not use drugs.      Physical Exam:  General:  Obese female, A&Ox3, no apparent distress, no deformities  Neck: No masses, normal thyroid  Lungs: normal inspiration, no use of accessory muscles  Heart: normal pulse, no arrhythmias  Abdomen: Soft, NT, ND, no masses, no hernias, no hepatosplenomegaly  Lymphatic: Neck and groin nodes negative  Skin: The skin is warm and dry. No jaundice.    Labs/Studies:   See HPI  Impression/Plan:   1. Blood in stool and constipation  GI referral was placed    2. Straining to void  Urinalysis and culture were sent.  Patient has a history of constipation and needing to strain to completely empty her bladder, PT pelvic floor training referral was placed.  Patient to return to clinic in 2 weeks for re-evaluation and to discuss results.

## 2024-03-21 ENCOUNTER — HOSPITAL ENCOUNTER (OUTPATIENT)
Dept: RADIOLOGY | Facility: HOSPITAL | Age: 44
Discharge: HOME | End: 2024-03-21
Payer: COMMERCIAL

## 2024-03-21 DIAGNOSIS — Z12.31 OTHER SCREENING MAMMOGRAM: ICD-10-CM

## 2024-03-21 PROCEDURE — 77067 SCR MAMMO BI INCL CAD: CPT | Mod: 26,,, | Performed by: RADIOLOGY

## 2024-03-21 PROCEDURE — 77063 BREAST TOMOSYNTHESIS BI: CPT | Mod: 26,,, | Performed by: RADIOLOGY

## 2024-03-21 PROCEDURE — 77067 SCR MAMMO BI INCL CAD: CPT | Mod: TC

## 2024-03-22 LAB
BACTERIA UR CULT: NORMAL
BACTERIA UR CULT: NORMAL

## 2024-03-25 ENCOUNTER — PATIENT MESSAGE (OUTPATIENT)
Dept: SURGERY | Facility: CLINIC | Age: 44
End: 2024-03-25
Payer: COMMERCIAL

## 2024-03-27 ENCOUNTER — OFFICE VISIT (OUTPATIENT)
Dept: SURGERY | Facility: CLINIC | Age: 44
End: 2024-03-27
Payer: COMMERCIAL

## 2024-03-27 VITALS
BODY MASS INDEX: 32.24 KG/M2 | TEMPERATURE: 98 F | SYSTOLIC BLOOD PRESSURE: 129 MMHG | WEIGHT: 205.38 LBS | OXYGEN SATURATION: 100 % | DIASTOLIC BLOOD PRESSURE: 87 MMHG | HEIGHT: 67 IN | HEART RATE: 89 BPM

## 2024-03-27 DIAGNOSIS — Z12.11 SCREENING FOR COLON CANCER: Primary | ICD-10-CM

## 2024-03-27 DIAGNOSIS — K59.09 OTHER CONSTIPATION: ICD-10-CM

## 2024-03-27 DIAGNOSIS — K92.1 BLOOD IN STOOL: ICD-10-CM

## 2024-03-27 PROCEDURE — 3079F DIAST BP 80-89 MM HG: CPT | Mod: CPTII,S$GLB,,

## 2024-03-27 PROCEDURE — 3074F SYST BP LT 130 MM HG: CPT | Mod: CPTII,S$GLB,,

## 2024-03-27 PROCEDURE — 99203 OFFICE O/P NEW LOW 30 MIN: CPT | Mod: S$GLB,,,

## 2024-03-27 PROCEDURE — 99999 PR PBB SHADOW E&M-EST. PATIENT-LVL IV: CPT | Mod: PBBFAC,,,

## 2024-03-27 PROCEDURE — 1159F MED LIST DOCD IN RCRD: CPT | Mod: CPTII,S$GLB,,

## 2024-03-27 PROCEDURE — 3008F BODY MASS INDEX DOCD: CPT | Mod: CPTII,S$GLB,,

## 2024-03-27 RX ORDER — SODIUM, POTASSIUM,MAG SULFATES 17.5-3.13G
1 SOLUTION, RECONSTITUTED, ORAL ORAL DAILY
Qty: 1 KIT | Refills: 0 | Status: SHIPPED | OUTPATIENT
Start: 2024-03-27 | End: 2024-03-29

## 2024-03-27 RX ORDER — PHENTERMINE HYDROCHLORIDE 37.5 MG/1
37.5 TABLET ORAL
COMMUNITY

## 2024-03-27 NOTE — PROGRESS NOTES
"History & Physical    SUBJECTIVE:     CC: painless rectal bleeding    History of Present Illness:  Patient is a 43 y.o. female presents with intermittent, painless rectal bleeding. Onset about 1 year ago. Intermittent rectal bleeding with bowel movements that drips in toilet as well as with wiping after BM. Denies rectal pain. Reports bleeding happens about twice per month, not present with every BM. Reports that she has had constipation since she was a child - associated symptoms include intermittent BLQ abdominal "pressure", abdominal bloating - no known aggravating/alleviating factors.  Patient reports bowel movement on average once per week, takes MiraLax about 2 times per week to achieve a bowel movement, does have frequent hard stools and has to strain with bowel movements, does not take any fiber or stool softeners, drinks more than 64 oz of water daily, sits on the toilet for more than 5 minutes at a time.  Denies previous abdominal surgeries.  Patient has never had a colonoscopy.  Denies family history of colorectal cancer, IBD, polyps.  No anticoagulation. Denies nausea, vomiting, fevers, chills, unintentional weight loss.     Review of patient's allergies indicates:  No Known Allergies    Current Outpatient Medications   Medication Sig Dispense Refill    amLODIPine (NORVASC) 5 MG tablet Take 1 tablet (5 mg total) by mouth once daily. 90 tablet 0    butalbital-acetaminophen-caffeine -40 mg (FIORICET, ESGIC) -40 mg per tablet Take 1 tablet by mouth every 8 (eight) hours as needed for Headaches. 30 tablet 0    diethylpropion (TENUATE) 75 mg SR tablet Take 75 mg by mouth once daily.      fluconazole (DIFLUCAN) 100 MG tablet Take one tablet today and may repeat every three days up to 3 doses 3 tablet 0    fluticasone propionate (FLONASE) 50 mcg/actuation nasal spray 2 sprays (100 mcg total) by Each Nostril route once daily. 16 g 1    hydroCHLOROthiazide (HYDRODIURIL) 25 MG tablet Take 1 tablet (25 " mg total) by mouth once daily. 90 tablet 0    ondansetron (ZOFRAN-ODT) 4 MG TbDL Take 2 tablets (8 mg total) by mouth every 6 (six) hours as needed (nausea). 20 tablet 0    ondansetron (ZOFRAN-ODT) 4 MG TbDL Take 2 tablets (8 mg total) by mouth every 6 (six) hours as needed (prn). 15 tablet 0    phentermine (ADIPEX-P) 37.5 mg tablet Take 37.5 mg by mouth before breakfast.      traZODone (DESYREL) 50 MG tablet Take 1 tablet (50 mg total) by mouth every evening. 90 tablet 0    ZOLMitriptan (ZOMIG-ZMT) 5 MG disintegrating tablet Take 1 tablet (5 mg total) by mouth as needed for Migraine. 18 tablet 1    albuterol (VENTOLIN HFA) 90 mcg/actuation inhaler Inhale 2 puffs into the lungs every 6 (six) hours as needed for Shortness of Breath. Rescue 18 g 0    azelastine (ASTELIN) 137 mcg (0.1 %) nasal spray 1 spray (137 mcg total) by Nasal route 2 (two) times daily. 30 mL 0    sodium,potassium,mag sulfates (SUPREP BOWEL PREP KIT) 17.5-3.13-1.6 gram SolR Take 177 mLs by mouth once daily. for 2 days 1 kit 0     Current Facility-Administered Medications   Medication Dose Route Frequency Provider Last Rate Last Admin    levonorgestreL (MIRENA) 21 mcg/24 hours (8 yrs) 52 mg IUD 1 Intra Uterine Device  1 Intra Uterine Device Intrauterine  Precious Camarena NP   1 Intra Uterine Device at 03/30/23 1330       Past Medical History:   Diagnosis Date    Hypertension      Past Surgical History:   Procedure Laterality Date    D&C x 3       miscarriages     Family History   Problem Relation Age of Onset    Heart disease Mother         a fib    Hypertension Mother     Diabetes Father     Cancer Maternal Aunt     Diabetes Paternal Aunt     Diabetes Maternal Grandmother     Heart disease Maternal Grandmother     Kidney disease Maternal Grandmother      Social History     Tobacco Use    Smoking status: Never    Smokeless tobacco: Never   Substance Use Topics    Alcohol use: No     Alcohol/week: 0.0 standard drinks of alcohol    Drug use: No     "    Review of Systems:  Review of Systems   Constitutional:  Negative for activity change, appetite change, chills, fatigue, fever and unexpected weight change.   HENT:  Negative for congestion, ear pain, sore throat and trouble swallowing.    Eyes:  Negative for pain, redness and itching.   Respiratory:  Negative for cough, shortness of breath and wheezing.    Cardiovascular:  Negative for chest pain, palpitations and leg swelling.   Gastrointestinal:  Positive for abdominal distention, abdominal pain, anal bleeding and constipation. Negative for blood in stool, diarrhea, nausea, rectal pain and vomiting.   Endocrine: Negative for cold intolerance, heat intolerance and polyuria.   Genitourinary:  Negative for dysuria, flank pain, frequency and hematuria.   Musculoskeletal:  Negative for gait problem, joint swelling and neck pain.   Skin:  Negative for color change, rash and wound.   Allergic/Immunologic: Negative for environmental allergies and immunocompromised state.   Neurological:  Negative for dizziness, speech difficulty, weakness and numbness.   Psychiatric/Behavioral:  Negative for agitation, confusion and hallucinations.        OBJECTIVE:     Vital Signs (Most Recent)  Temp: 98 °F (36.7 °C) (03/27/24 1414)  Pulse: 89 (03/27/24 1414)  BP: 129/87 (03/27/24 1414)  SpO2: 100 % (03/27/24 1414)  5' 7" (1.702 m)  93.2 kg (205 lb 5.7 oz)     Physical Exam:  Physical Exam  Vitals and nursing note reviewed. Exam conducted with a chaperone present.   Constitutional:       General: She is not in acute distress.     Appearance: Normal appearance. She is well-developed and normal weight. She is not ill-appearing or toxic-appearing.   HENT:      Head: Normocephalic and atraumatic.      Right Ear: External ear normal.      Left Ear: External ear normal.      Nose: Nose normal.   Cardiovascular:      Rate and Rhythm: Normal rate.   Pulmonary:      Effort: Pulmonary effort is normal. No respiratory distress.   Abdominal:    "   Palpations: There is no mass.      Tenderness: There is no guarding or rebound.      Comments: Non-TTP, non-distended, soft. No previous incisions   Genitourinary:     Comments: Anorectal: offered but deferred  Musculoskeletal:         General: Normal range of motion.      Cervical back: Normal range of motion and neck supple.   Skin:     General: Skin is warm and dry.   Neurological:      Mental Status: She is alert and oriented to person, place, and time.   Psychiatric:         Mood and Affect: Mood normal.         Behavior: Behavior normal.         ASSESSMENT/PLAN:     43 y.o. female with complaints of intermittent rectal bleeding, constipation, abdominal distention    -patient has never had a colonoscopy and is due for screening. Recommend screening colonoscopy which will also evaluate for cause of symptoms. If hemorrhoids present and thought to be cause of bleeding, would like to do hemorrhoid banding. If no other cause of constipation/distention, recommend referral to GI or talk to PCP about constipation medication if continues despite improvement of bowel habits  -plan for colonoscopy with possible hemorrhoid banding 04/18/24 at 10:30AM. Suprep to pharmacy. Instructions given to hold adipex 1 week before procedure   -Discussed that a minimum I would recommend behavioral, lifestyle and medication modifications to improve bowel habits. Usual bowel management handout given to patient. This includes a stool softener twice per day, fiber powder supplementation daily, drinking at least 64oz of water/day, avoiding straining with bowel movements, spending less than 5 min on toilet per bowel movement, eating a high fiber diet, using miralax as needed to achieve a bowel movement daily and using wet wipes to wipe after bowel movements when irritated.   -RTC prn     Rigo Jackson PA-C  Colon and Rectal Surgery  [facility]

## 2024-04-03 DIAGNOSIS — I10 HYPERTENSION: ICD-10-CM

## 2024-04-18 ENCOUNTER — ANESTHESIA (OUTPATIENT)
Dept: ENDOSCOPY | Facility: HOSPITAL | Age: 44
End: 2024-04-18
Payer: COMMERCIAL

## 2024-04-18 ENCOUNTER — HOSPITAL ENCOUNTER (OUTPATIENT)
Facility: HOSPITAL | Age: 44
Discharge: HOME | End: 2024-04-18
Attending: STUDENT IN AN ORGANIZED HEALTH CARE EDUCATION/TRAINING PROGRAM | Admitting: STUDENT IN AN ORGANIZED HEALTH CARE EDUCATION/TRAINING PROGRAM
Payer: COMMERCIAL

## 2024-04-18 ENCOUNTER — ANESTHESIA EVENT (OUTPATIENT)
Dept: ENDOSCOPY | Facility: HOSPITAL | Age: 44
End: 2024-04-18
Payer: COMMERCIAL

## 2024-04-18 DIAGNOSIS — Z12.11 SCREENING FOR COLON CANCER: Primary | ICD-10-CM

## 2024-04-18 LAB
B-HCG UR QL: NEGATIVE
CTP QC/QA: YES

## 2024-04-18 PROCEDURE — 27201022: Performed by: STUDENT IN AN ORGANIZED HEALTH CARE EDUCATION/TRAINING PROGRAM

## 2024-04-18 PROCEDURE — 88305 TISSUE EXAM BY PATHOLOGIST: CPT | Performed by: PATHOLOGY

## 2024-04-18 PROCEDURE — 81025 URINE PREGNANCY TEST: CPT | Performed by: STUDENT IN AN ORGANIZED HEALTH CARE EDUCATION/TRAINING PROGRAM

## 2024-04-18 PROCEDURE — 46221 LIGATION OF HEMORRHOID(S): CPT | Mod: 51,,, | Performed by: STUDENT IN AN ORGANIZED HEALTH CARE EDUCATION/TRAINING PROGRAM

## 2024-04-18 PROCEDURE — 45385 COLONOSCOPY W/LESION REMOVAL: CPT | Mod: ,,, | Performed by: STUDENT IN AN ORGANIZED HEALTH CARE EDUCATION/TRAINING PROGRAM

## 2024-04-18 PROCEDURE — 37000008 HC ANESTHESIA 1ST 15 MINUTES: Performed by: STUDENT IN AN ORGANIZED HEALTH CARE EDUCATION/TRAINING PROGRAM

## 2024-04-18 PROCEDURE — 45385 COLONOSCOPY W/LESION REMOVAL: CPT | Performed by: STUDENT IN AN ORGANIZED HEALTH CARE EDUCATION/TRAINING PROGRAM

## 2024-04-18 PROCEDURE — 37000009 HC ANESTHESIA EA ADD 15 MINS: Performed by: STUDENT IN AN ORGANIZED HEALTH CARE EDUCATION/TRAINING PROGRAM

## 2024-04-18 PROCEDURE — 27201089 HC SNARE, DISP (ANY): Performed by: STUDENT IN AN ORGANIZED HEALTH CARE EDUCATION/TRAINING PROGRAM

## 2024-04-18 PROCEDURE — 63600175 PHARM REV CODE 636 W HCPCS

## 2024-04-18 PROCEDURE — 88305 TISSUE EXAM BY PATHOLOGIST: CPT | Mod: 26,,, | Performed by: PATHOLOGY

## 2024-04-18 PROCEDURE — 25000003 PHARM REV CODE 250

## 2024-04-18 PROCEDURE — 46221 LIGATION OF HEMORRHOID(S): CPT | Performed by: STUDENT IN AN ORGANIZED HEALTH CARE EDUCATION/TRAINING PROGRAM

## 2024-04-18 RX ORDER — PROPOFOL 10 MG/ML
VIAL (ML) INTRAVENOUS
Status: DISCONTINUED | OUTPATIENT
Start: 2024-04-18 | End: 2024-04-18

## 2024-04-18 RX ORDER — LIDOCAINE HYDROCHLORIDE 10 MG/ML
INJECTION, SOLUTION EPIDURAL; INFILTRATION; INTRACAUDAL; PERINEURAL
Status: DISCONTINUED | OUTPATIENT
Start: 2024-04-18 | End: 2024-04-18

## 2024-04-18 RX ORDER — SODIUM CHLORIDE, SODIUM LACTATE, POTASSIUM CHLORIDE, CALCIUM CHLORIDE 600; 310; 30; 20 MG/100ML; MG/100ML; MG/100ML; MG/100ML
INJECTION, SOLUTION INTRAVENOUS CONTINUOUS PRN
Status: DISCONTINUED | OUTPATIENT
Start: 2024-04-18 | End: 2024-04-18

## 2024-04-18 RX ADMIN — PROPOFOL 100 MG: 10 INJECTION, EMULSION INTRAVENOUS at 11:04

## 2024-04-18 RX ADMIN — SODIUM CHLORIDE, SODIUM LACTATE, POTASSIUM CHLORIDE, AND CALCIUM CHLORIDE: 600; 310; 30; 20 INJECTION, SOLUTION INTRAVENOUS at 11:04

## 2024-04-18 RX ADMIN — LIDOCAINE HYDROCHLORIDE 50 MG: 10 SOLUTION INTRAVENOUS at 11:04

## 2024-04-18 NOTE — ANESTHESIA POSTPROCEDURE EVALUATION
Anesthesia Post Evaluation    Patient: Judie Munoz    Procedure(s) Performed: Procedure(s) (LRB):  COLONOSCOPY- possible hemorrhoid banding (N/A)    Final Anesthesia Type: MAC      Patient location during evaluation: GI PACU  Patient participation: Yes- Able to Participate  Level of consciousness: awake and alert  Post-procedure vital signs: reviewed and stable  Pain management: adequate  Airway patency: patent    PONV status at discharge: No PONV  Anesthetic complications: no      Cardiovascular status: blood pressure returned to baseline and hemodynamically stable  Respiratory status: unassisted, spontaneous ventilation and room air  Hydration status: euvolemic  Follow-up not needed.              Vitals Value Taken Time   /74 04/18/24 1237   Temp 98 04/18/24 1237   Pulse 78 04/18/24 1237   Resp 15 04/18/24 1237   SpO2 100 04/18/24 1237         No case tracking events are documented in the log.      Pain/Mari Score: No data recorded

## 2024-04-18 NOTE — ANESTHESIA PREPROCEDURE EVALUATION
04/18/2024  Judie Munoz is a 43 y.o., female.      Pre-op Assessment    I have reviewed the Patient Summary Reports.     I have reviewed the Nursing Notes. I have reviewed the NPO Status.   I have reviewed the Medications.     Review of Systems  Anesthesia Hx:  No problems with previous Anesthesia             Denies Family Hx of Anesthesia complications.    Denies Personal Hx of Anesthesia complications.                    Cardiovascular:     Hypertension, well controlled    Denies CAD.        Denies CHF.                                 Pulmonary:  Pulmonary Normal                       Renal/:  Renal/ Normal                 Musculoskeletal:  Musculoskeletal Normal                Neurological:  Neurology Normal                                      Endocrine:  Endocrine Normal                Physical Exam  General: Well nourished    Airway:  Mallampati: II   Mouth Opening: Normal  TM Distance: Normal  Tongue: Normal  Neck ROM: Normal ROM    Dental:  Intact        Anesthesia Plan  Type of Anesthesia, risks & benefits discussed:    Anesthesia Type: MAC  Intra-op Monitoring Plan: Standard ASA Monitors  Induction:  IV  Informed Consent: Informed consent signed with the Patient and all parties understand the risks and agree with anesthesia plan.  All questions answered.   ASA Score: 2  Day of Surgery Review of History & Physical: H&P Update referred to the surgeon/provider.    Ready For Surgery From Anesthesia Perspective.     .

## 2024-04-18 NOTE — TRANSFER OF CARE
Anesthesia Transfer of Care Note    Patient: Judie Munoz    Procedure(s) Performed: Procedure(s) (LRB):  COLONOSCOPY- possible hemorrhoid banding (N/A)    Patient location: GI    Anesthesia Type: MAC    Transport from OR: Transported from OR on room air with adequate spontaneous ventilation    Post pain: adequate analgesia    Post assessment: no apparent anesthetic complications    Post vital signs: stable    Level of consciousness: awake    Nausea/Vomiting: no nausea/vomiting    Complications: none    Transfer of care protocol was followed      Last vitals: Visit Vitals  /74   Pulse 87   Temp 37 °C (98.6 °F)   Resp 18   SpO2 98%   Breastfeeding No

## 2024-04-18 NOTE — H&P
[facility] ([facility])  Colon and Rectal Surgery  History & Physical    Patient Name: Judie Munoz  MRN: [id number]  Admission Date: 4/18/2024  Attending Physician: Jo Jones MD  Primary Care Provider: Ritika Tnog MD    Patient information was obtained from patient and medical records.    Subjective:     Chief Complaint/Reason for Admission: Here for Colonoscopy    History of Present Illness:  Patient is a 43 y.o. female presents for colonoscopy. Never had prior colonoscopy. Has BRBPR occasionally with constipation. Improved with fiber supplementation    Denies FH of colorectal cancer, polyps or IBD       No current facility-administered medications for this encounter.       Review of patient's allergies indicates:  No Known Allergies    Past Medical History:   Diagnosis Date    Hypertension      Past Surgical History:   Procedure Laterality Date    D&C x 3       miscarriages     Family History       Problem Relation (Age of Onset)    Cancer Maternal Aunt    Diabetes Father, Paternal Aunt, Maternal Grandmother    Heart disease Mother, Maternal Grandmother    Hypertension Mother    Kidney disease Maternal Grandmother          Tobacco Use    Smoking status: Never    Smokeless tobacco: Never   Substance and Sexual Activity    Alcohol use: No     Alcohol/week: 0.0 standard drinks of alcohol    Drug use: No    Sexual activity: Not Currently     Partners: Male       Objective:     Vital Signs (Most Recent):  Temp: 98.6 °F (37 °C) (04/18/24 1032)  Pulse: 87 (04/18/24 1032)  Resp: 18 (04/18/24 1032)  BP: 118/74 (04/18/24 1032)  SpO2: 98 % (04/18/24 1032) Vital Signs (24h Range):  Temp:  [98.6 °F (37 °C)] 98.6 °F (37 °C)  Pulse:  [87] 87  Resp:  [18] 18  SpO2:  [98 %] 98 %  BP: (118)/(74) 118/74        There is no height or weight on file to calculate BMI.    Physical Exam  Constitutional:       Appearance: She is well-developed.   HENT:      Head: Normocephalic and atraumatic.   Eyes:       Conjunctiva/sclera: Conjunctivae normal.      Pupils: Pupils are equal, round, and reactive to light.   Neck:      Thyroid: No thyromegaly.   Cardiovascular:      Rate and Rhythm: Normal rate and regular rhythm.   Pulmonary:      Effort: Pulmonary effort is normal. No respiratory distress.   Abdominal:      General: There is no distension.      Palpations: Abdomen is soft. There is no mass.      Tenderness: There is no abdominal tenderness.   Musculoskeletal:         General: No tenderness. Normal range of motion.      Cervical back: Normal range of motion.   Skin:     General: Skin is warm and dry.      Capillary Refill: Capillary refill takes less than 2 seconds.   Neurological:      General: No focal deficit present.      Mental Status: She is alert and oriented to person, place, and time.           Assessment/Plan:     Patient is a 43 y.o. female who presents for colonoscopy with possible banding    - Ok to proceed to endoscopy suite for colonoscopy  - Consent obtained. All risks, benefits and alternatives fully explained to patient, including but not limited to bleeding, infection, perforation, and missed polyps. All questions appropriately answered to patient's satisfaction. Consent signed and placed on chart.    Active Diagnoses:    Diagnosis Date Noted POA    Screening for colon cancer [Z12.11] 04/18/2024 Not Applicable      Problems Resolved During this Admission:     VTE Risk Mitigation (From admission, onward)      None            Jo Jones MD  Colon and Rectal Surgery  [facility] ([facility])

## 2024-04-18 NOTE — PROVATION PATIENT INSTRUCTIONS
Discharge Summary/Instructions after an Endoscopic Procedure  Patient Name: Judie Munoz  Patient MRN: [id number]  Patient YOB: 1980 Thursday, April 18, 2024 Jo Jones MD  Dear patient,  As a result of recent federal legislation (The Federal Cures Act), you may   receive lab or pathology results from your procedure in your [facility]   account before your physician is able to contact you. Your physician or   their representative will relay the results to you with their   recommendations at their soonest availability.  Thank you,  RESTRICTIONS:  During your procedure today, you received medications for sedation.  These   medications may affect your judgment, balance and coordination.  Therefore,   for 24 hours, you have the following restrictions:   - DO NOT drive a car, operate machinery, make legal/financial decisions,   sign important papers or drink alcohol.    ACTIVITY:  Today: no heavy lifting, straining or running due to procedural   sedation/anesthesia.  The following day: return to full activity including work.  DIET:  Eat and drink normally unless instructed otherwise.     TREATMENT FOR COMMON SIDE EFFECTS:  - Mild abdominal pain, nausea, belching, bloating or excessive gas:  rest,   eat lightly and use a heating pad.  - Sore Throat: treat with throat lozenges and/or gargle with warm salt   water.  - Because air was used during the procedure, expelling large amounts of air   from your rectum or belching is normal.  - If a bowel prep was taken, you may not have a bowel movement for 1-3 days.    This is normal.  SYMPTOMS TO WATCH FOR AND REPORT TO YOUR PHYSICIAN:  1. Abdominal pain or bloating, other than gas cramps.  2. Chest pain.  3. Back pain.  4. Signs of infection such as: chills or fever occurring within 24 hours   after the procedure.  5. Rectal bleeding, which would show as bright red, maroon, or black stools.   (A tablespoon of blood from the rectum is not serious, especially if    hemorrhoids are present.)  6. Vomiting.  7. Weakness or dizziness.  GO DIRECTLY TO THE NEAREST EMERGENCY ROOM IF YOU HAVE ANY OF THE FOLLOWING:      Difficulty breathing              Chills and/or fever over 101 F   Persistent vomiting and/or vomiting blood   Severe abdominal pain   Severe chest pain   Black, tarry stools   Bleeding- more than one tablespoon   Any other symptom or condition that you feel may need urgent attention  Your doctor recommends these additional instructions:  If any biopsies were taken, your doctors clinic will contact you in 1 to 2   weeks with any results.  - Discharge patient to home (ambulatory).   - Patient has a contact number available for emergencies.  The signs and   symptoms of potential delayed complications were discussed with the   patient.  Return to normal activities tomorrow.  Written discharge   instructions were provided to the patient.   - Resume previous diet.   - Continue present medications.   - Return to primary care physician as previously scheduled.   - Repeat colonoscopy in 5 years for surveillance.  For questions, problems or results please call your physician Jo Jones MD at Work:  [phone]  If you have any questions about the above instructions, call the GI   department at [phone] or call the endoscopy unit at [phone]   from 7am until 3 pm.  [facility], EMERGENCY ROOM PHONE NUMBER:   [phone]  IF A COMPLICATION OR EMERGENCY SITUATION ARISES AND YOU ARE UNABLE TO REACH   YOUR PHYSICIAN - GO DIRECTLY TO THE EMERGENCY ROOM.  I have read or have had read to me these discharge instructions for my   procedure and have received a written copy.  I understand these   instructions and will follow-up with my physician if I have any questions.     __________________________________       _____________________________________  Nurse Signature                                          Patient/Designated   Responsible  Party Signature  Jo Jones MD  4/18/2024 12:00:15 PM  This report has been verified and signed electronically.  Dear patient,  As a result of recent federal legislation (The Federal Cures Act), you may   receive lab or pathology results from your procedure in your [facility]   account before your physician is able to contact you. Your physician or   their representative will relay the results to you with their   recommendations at their soonest availability.  Thank you,  PROVATION

## 2024-04-18 NOTE — BRIEF OP NOTE
[facility])  Brief Operative Note     SUMMARY     Surgery Date: 4/18/2024     Surgeons and Role:     * Jo Garcia MD - Primary    Assisting Surgeon: None    Pre-op Diagnosis:  Blood in stool [K92.1]  Screening for colon cancer [Z12.11]    Post-op Diagnosis:  Post-Op Diagnosis Codes:     * Blood in stool [K92.1]     * Screening for colon cancer [Z12.11]    Procedure(s) (LRB):  COLONOSCOPY- possible hemorrhoid banding (N/A)    Anesthesia: Choice    Description of the findings of the procedure: See Op Note    Findings/Key Components: descending polyp. Internal hemorrhoids banded    Estimated Blood Loss: * No values recorded between 4/18/2024 11:34 AM and 4/18/2024 11:56 AM *         Specimens:   Specimen (24h ago, onward)       Start     Ordered    04/18/24 1150  Specimen to Pathology, Surgery Gastrointestinal tract  Once        Comments: Pre-op Diagnosis: Blood in stool [K92.1]Screening for colon cancer [Z12.11]Procedure(s):COLONOSCOPY- possible hemorrhoid banding Name of specimens: 1. Descending colon polypectomy     References:    Click here for ordering Quick Tip   Question Answer Comment   Procedure Type: Gastrointestinal tract    Specimen Class: Routine/Screening    Which provider would you like to cc? JO GARCIA    Release to patient Immediate        04/18/24 1155                    Discharge Note    SUMMARY     Admit Date: 4/18/2024    Discharge Date and Time: 4/18/2024 12:01 PM    Hospital Course Patient was seen in the preoperative area by both myself and anesthesia. All consents were verified and all questions appropriately answered. All risks, benefits and alternatives explained to patient. Patient proceeded to endoscopy suite for colonoscopy and was discharged home postoperative once cleared by anesthesia.    Final Diagnosis: Post-Op Diagnosis Codes:     * Blood in stool [K92.1]     * Screening for colon cancer [Z12.11]    Disposition: Home or Self Care    Follow  Up/Patient Instructions: See Provation report    Medications:  Reconciled Home Medications:      Medication List        CONTINUE taking these medications      albuterol 90 mcg/actuation inhaler  Commonly known as: VENTOLIN HFA  Inhale 2 puffs into the lungs every 6 (six) hours as needed for Shortness of Breath. Rescue     amLODIPine 5 MG tablet  Commonly known as: NORVASC  Take 1 tablet (5 mg total) by mouth once daily.     azelastine 137 mcg (0.1 %) nasal spray  Commonly known as: ASTELIN  1 spray (137 mcg total) by Nasal route 2 (two) times daily.     butalbital-acetaminophen-caffeine -40 mg -40 mg per tablet  Commonly known as: FIORICET, ESGIC  Take 1 tablet by mouth every 8 (eight) hours as needed for Headaches.     fluticasone propionate 50 mcg/actuation nasal spray  Commonly known as: FLONASE  2 sprays (100 mcg total) by Each Nostril route once daily.     hydroCHLOROthiazide 25 MG tablet  Commonly known as: HYDRODIURIL  Take 1 tablet (25 mg total) by mouth once daily.     * ondansetron 4 MG Tbdl  Commonly known as: ZOFRAN-ODT  Take 2 tablets (8 mg total) by mouth every 6 (six) hours as needed (nausea).     * ondansetron 4 MG Tbdl  Commonly known as: ZOFRAN-ODT  Take 2 tablets (8 mg total) by mouth every 6 (six) hours as needed (prn).     traZODone 50 MG tablet  Commonly known as: DESYREL  Take 1 tablet (50 mg total) by mouth every evening.     ZOLMitriptan 5 MG disintegrating tablet  Commonly known as: ZOMIG-ZMT  Take 1 tablet (5 mg total) by mouth as needed for Migraine.           * This list has 2 medication(s) that are the same as other medications prescribed for you. Read the directions carefully, and ask your doctor or other care provider to review them with you.                STOP taking these medications      diethylpropion 75 mg SR tablet  Commonly known as: TENUATE     fluconazole 100 MG tablet  Commonly known as: DIFLUCAN            ASK your doctor about these medications       phentermine 37.5 mg tablet  Commonly known as: ADIPEX-P  Take 37.5 mg by mouth before breakfast.            Discharge Procedure Orders   Diet general

## 2024-04-19 VITALS
TEMPERATURE: 97 F | DIASTOLIC BLOOD PRESSURE: 74 MMHG | OXYGEN SATURATION: 95 % | SYSTOLIC BLOOD PRESSURE: 118 MMHG | RESPIRATION RATE: 18 BRPM | HEART RATE: 85 BPM

## 2024-04-23 LAB
COMMENT: NORMAL
FINAL PATHOLOGIC DIAGNOSIS: NORMAL
GROSS: NORMAL
Lab: NORMAL

## 2024-07-01 ENCOUNTER — OFFICE VISIT (OUTPATIENT)
Dept: INTERNAL MEDICINE | Facility: CLINIC | Age: 44
End: 2024-07-01
Payer: COMMERCIAL

## 2024-07-01 VITALS
SYSTOLIC BLOOD PRESSURE: 118 MMHG | BODY MASS INDEX: 33.74 KG/M2 | HEIGHT: 67 IN | WEIGHT: 214.94 LBS | HEART RATE: 84 BPM | DIASTOLIC BLOOD PRESSURE: 86 MMHG | OXYGEN SATURATION: 98 %

## 2024-07-01 DIAGNOSIS — E66.9 OBESITY (BMI 30-39.9): ICD-10-CM

## 2024-07-01 DIAGNOSIS — I10 ESSENTIAL HYPERTENSION: ICD-10-CM

## 2024-07-01 DIAGNOSIS — Z00.00 ANNUAL PHYSICAL EXAM: Primary | ICD-10-CM

## 2024-07-01 DIAGNOSIS — G47.00 INSOMNIA, UNSPECIFIED TYPE: ICD-10-CM

## 2024-07-01 DIAGNOSIS — I10 HYPERTENSION GOAL BP (BLOOD PRESSURE) < 140/90: ICD-10-CM

## 2024-07-01 PROCEDURE — 99999 PR PBB SHADOW E&M-EST. PATIENT-LVL III: CPT | Mod: PBBFAC,,, | Performed by: FAMILY MEDICINE

## 2024-07-01 PROCEDURE — 3008F BODY MASS INDEX DOCD: CPT | Mod: CPTII,S$GLB,, | Performed by: FAMILY MEDICINE

## 2024-07-01 PROCEDURE — 3079F DIAST BP 80-89 MM HG: CPT | Mod: CPTII,S$GLB,, | Performed by: FAMILY MEDICINE

## 2024-07-01 PROCEDURE — 3074F SYST BP LT 130 MM HG: CPT | Mod: CPTII,S$GLB,, | Performed by: FAMILY MEDICINE

## 2024-07-01 PROCEDURE — 1160F RVW MEDS BY RX/DR IN RCRD: CPT | Mod: CPTII,S$GLB,, | Performed by: FAMILY MEDICINE

## 2024-07-01 PROCEDURE — 1159F MED LIST DOCD IN RCRD: CPT | Mod: CPTII,S$GLB,, | Performed by: FAMILY MEDICINE

## 2024-07-01 PROCEDURE — 99396 PREV VISIT EST AGE 40-64: CPT | Mod: S$GLB,,, | Performed by: FAMILY MEDICINE

## 2024-07-01 RX ORDER — PHENTERMINE HYDROCHLORIDE 37.5 MG/1
37.5 TABLET ORAL
Qty: 30 TABLET | Refills: 2 | Status: SHIPPED | OUTPATIENT
Start: 2024-07-01

## 2024-07-01 RX ORDER — TOPIRAMATE 25 MG/1
25 TABLET ORAL 2 TIMES DAILY
Qty: 60 TABLET | Refills: 11 | Status: SHIPPED | OUTPATIENT
Start: 2024-07-01 | End: 2025-07-01

## 2024-07-01 RX ORDER — HYDROCHLOROTHIAZIDE 25 MG/1
25 TABLET ORAL DAILY
Qty: 90 TABLET | Refills: 3 | Status: SHIPPED | OUTPATIENT
Start: 2024-07-01

## 2024-07-01 RX ORDER — AMLODIPINE BESYLATE 5 MG/1
5 TABLET ORAL DAILY
Qty: 90 TABLET | Refills: 3 | Status: SHIPPED | OUTPATIENT
Start: 2024-07-01

## 2024-07-01 NOTE — PROGRESS NOTES
Judie Munoz  07/01/2024  [id number]    Ritika Tong MD  Patient Care Team:  Ritika Tong MD as PCP - General (Family Medicine)  Lilibeth Callejas LPN as Care Coordinator (Internal Medicine)          Visit Type:a scheduled routine follow-up visit    Chief Complaint:  Chief Complaint   Patient presents with    Annual Exam       History of Present Illness:  43 year old  Annual    HTN  On meds  Bp is good today  Checks at home.     Did colon screen this year.  After seeing blood in stool    Pap and MMG are up to date        History:  Past Medical History:   Diagnosis Date    Hypertension      Past Surgical History:   Procedure Laterality Date    COLONOSCOPY N/A 4/18/2024    Procedure: COLONOSCOPY- possible hemorrhoid banding;  Surgeon: Jo Jones MD;  Location: [facility];  Service: Colon and Rectal;  Laterality: N/A;    D&C x 3       miscarriages     Family History   Problem Relation Name Age of Onset    Heart disease Mother          a fib    Hypertension Mother      Diabetes Father      Cancer Maternal Aunt      Diabetes Paternal Aunt      Diabetes Maternal Grandmother      Heart disease Maternal Grandmother      Kidney disease Maternal Grandmother       Social History     Socioeconomic History    Marital status: Single    Number of children: 2   Occupational History     Employer: [employer]   Tobacco Use    Smoking status: Never    Smokeless tobacco: Never   Substance and Sexual Activity    Alcohol use: No     Alcohol/week: 0.0 standard drinks of alcohol    Drug use: No    Sexual activity: Not Currently     Partners: Male     Social Determinants of Health     Financial Resource Strain: Patient Declined (3/27/2024)    Overall Financial Resource Strain (CARDIA)     Difficulty of Paying Living Expenses: Patient declined   Food Insecurity: Patient Declined (3/27/2024)    Hunger Vital Sign     Worried About Running Out of Food in the Last Year: Patient declined     Ran Out  of Food in the Last Year: Patient declined   Transportation Needs: Unknown (3/27/2024)    PRAPARE - Transportation     Lack of Transportation (Medical): Patient declined     Lack of Transportation (Non-Medical): No   Physical Activity: Insufficiently Active (3/27/2024)    Exercise Vital Sign     Days of Exercise per Week: 1 day     Minutes of Exercise per Session: 30 min   Stress: Stress Concern Present (3/27/2024)    [employer] - Occupational Stress Questionnaire     Feeling of Stress : Very much   Housing Stability: Patient Declined (3/27/2024)    Housing Stability Vital Sign     Unable to Pay for Housing in the Last Year: Patient declined     Unstable Housing in the Last Year: Patient declined     Patient Active Problem List   Diagnosis    Hypertension    Migraines    Thyroid nodule    Screening for colon cancer     Review of patient's allergies indicates:  No Known Allergies    The following were reviewed at this visit: active problem list, medication list, allergies, family history, social history, and health maintenance.    Medications:  Current Outpatient Medications on File Prior to Visit   Medication Sig Dispense Refill    butalbital-acetaminophen-caffeine -40 mg (FIORICET, ESGIC) -40 mg per tablet Take 1 tablet by mouth every 8 (eight) hours as needed for Headaches. 30 tablet 0    ondansetron (ZOFRAN-ODT) 4 MG TbDL Take 2 tablets (8 mg total) by mouth every 6 (six) hours as needed (nausea). 20 tablet 0    ondansetron (ZOFRAN-ODT) 4 MG TbDL Take 2 tablets (8 mg total) by mouth every 6 (six) hours as needed (prn). 15 tablet 0    ZOLMitriptan (ZOMIG-ZMT) 5 MG disintegrating tablet Take 1 tablet (5 mg total) by mouth as needed for Migraine. 18 tablet 1    [DISCONTINUED] amLODIPine (NORVASC) 5 MG tablet Take 1 tablet (5 mg total) by mouth once daily. 90 tablet 0    [DISCONTINUED] hydroCHLOROthiazide (HYDRODIURIL) 25 MG tablet Take 1 tablet (25 mg total) by mouth once  daily. 90 tablet 0    albuterol (VENTOLIN HFA) 90 mcg/actuation inhaler Inhale 2 puffs into the lungs every 6 (six) hours as needed for Shortness of Breath. Rescue (Patient not taking: Reported on 7/1/2024) 18 g 0    azelastine (ASTELIN) 137 mcg (0.1 %) nasal spray 1 spray (137 mcg total) by Nasal route 2 (two) times daily. (Patient not taking: Reported on 7/1/2024) 30 mL 0    fluticasone propionate (FLONASE) 50 mcg/actuation nasal spray 2 sprays (100 mcg total) by Each Nostril route once daily. (Patient not taking: Reported on 7/1/2024) 16 g 1    phentermine (ADIPEX-P) 37.5 mg tablet Take 37.5 mg by mouth before breakfast. (Patient not taking: Reported on 4/9/2024)      traZODone (DESYREL) 50 MG tablet Take 1 tablet (50 mg total) by mouth every evening. (Patient not taking: Reported on 7/1/2024) 90 tablet 0     Current Facility-Administered Medications on File Prior to Visit   Medication Dose Route Frequency Provider Last Rate Last Admin    levonorgestreL (MIRENA) 21 mcg/24 hours (8 yrs) 52 mg IUD 1 Intra Uterine Device  1 Intra Uterine Device Intrauterine  Precious Camarena, NP   1 Intra Uterine Device at 03/30/23 1330       Medications have been reviewed and reconciled with patient at this visit.  Barriers to medications reviewed with patient.    Adverse reactions to current medications reviewed with patient..    Over the counter medications reviewed and reconciled with patient.    Exam:  Wt Readings from Last 3 Encounters:   07/01/24 97.5 kg (214 lb 15.2 oz)   03/27/24 93.2 kg (205 lb 5.7 oz)   03/20/24 92.7 kg (204 lb 5.9 oz)     Temp Readings from Last 3 Encounters:   04/18/24 97 °F (36.1 °C)   03/27/24 98 °F (36.7 °C) (Oral)   02/07/24 97.3 °F (36.3 °C) (Tympanic)     BP Readings from Last 3 Encounters:   07/01/24 118/86   04/18/24 118/74   03/27/24 129/87     Pulse Readings from Last 3 Encounters:   07/01/24 84   04/18/24 85   03/27/24 89     Body mass index is 33.67 kg/m².      Review of Systems    Constitutional: Negative.  Negative for chills and fever.   HENT: Negative.  Negative for congestion, sinus pain and sore throat.    Eyes:  Negative for blurred vision and double vision.   Respiratory:  Negative for cough, sputum production, shortness of breath and wheezing.    Cardiovascular:  Negative for chest pain, palpitations and leg swelling.   Gastrointestinal:  Negative for abdominal pain, constipation, diarrhea, heartburn, nausea and vomiting.   Genitourinary: Negative.    Musculoskeletal: Negative.    Skin: Negative.  Negative for rash.   Neurological: Negative.    Endo/Heme/Allergies: Negative.  Negative for polydipsia. Does not bruise/bleed easily.   Psychiatric/Behavioral:  Negative for depression and substance abuse.      Physical Exam  Nursing note reviewed.   Cardiovascular:      Rate and Rhythm: Normal rate and regular rhythm.   Pulmonary:      Effort: Pulmonary effort is normal. No respiratory distress.   Neurological:      Mental Status: She is alert and oriented to person, place, and time.   Psychiatric:         Mood and Affect: Mood normal.         Behavior: Behavior normal.         Thought Content: Thought content normal.         Judgment: Judgment normal.         Laboratory Reviewed ({Yes)  Lab Results   Component Value Date    WBC 8.13 03/05/2024    HGB 12.9 03/05/2024    HCT 39.2 03/05/2024     03/05/2024    CHOL 176 03/28/2023    TRIG 62 03/28/2023    HDL 48 03/28/2023    ALT 12 03/28/2023    AST 12 03/28/2023     03/28/2023    K 3.5 03/28/2023     03/28/2023    CREATININE 0.8 03/28/2023    BUN 12 03/28/2023    CO2 28 03/28/2023    TSH 2.201 03/05/2024    HGBA1C 5.3 03/28/2023       Judie was seen today for annual exam.    Diagnoses and all orders for this visit:    Annual physical exam  -     Comprehensive Metabolic Panel; Future  -     Lipid Panel; Future  -     Hemoglobin A1C; Future    Insomnia, unspecified type  -     topiramate (TOPAMAX) 25 MG tablet; Take 1  tablet (25 mg total) by mouth 2 (two) times daily.    Essential hypertension  -     amLODIPine (NORVASC) 5 MG tablet; Take 1 tablet (5 mg total) by mouth once daily.    Hypertension goal BP (blood pressure) < 140/90  -     hydroCHLOROthiazide (HYDRODIURIL) 25 MG tablet; Take 1 tablet (25 mg total) by mouth once daily.    Obesity (BMI 30-39.9)  The following orders have not been finalized:  -     phentermine (ADIPEX-P) 37.5 mg tablet                Care Plan/Goals: Reviewed    Goals          Patient Stated      Blood Pressure < 130/80 (pt-stated)        Other      Exercise at least 150 minutes per week.       Keep up the great work with getting 30 min of exercise most days per week!         Maintain a low sodium diet       Aim for less than 2000 mg sodium per day and look for items with less than 140 mg sodium per serving        Take at least one BP reading per week at various times of the day             Follow up: Follow up in about 1 year (around 7/1/2025).    After visit summary was printed and given to patient upon discharge today.  Patient goals and care plan are included in After Visit Summary.

## 2024-07-07 ENCOUNTER — PATIENT MESSAGE (OUTPATIENT)
Dept: INTERNAL MEDICINE | Facility: CLINIC | Age: 44
End: 2024-07-07
Payer: COMMERCIAL

## 2024-07-08 DIAGNOSIS — R30.0 DYSURIA: Primary | ICD-10-CM

## 2024-07-08 NOTE — TELEPHONE ENCOUNTER
Patient had annual visit on 7/1/24. Has lab appt on 7/9/24. Is requesting a UA be added to those labs.

## 2024-07-09 ENCOUNTER — LAB VISIT (OUTPATIENT)
Dept: LAB | Facility: HOSPITAL | Age: 44
End: 2024-07-09
Attending: FAMILY MEDICINE
Payer: COMMERCIAL

## 2024-07-09 DIAGNOSIS — R30.0 DYSURIA: ICD-10-CM

## 2024-07-09 DIAGNOSIS — Z00.00 ANNUAL PHYSICAL EXAM: ICD-10-CM

## 2024-07-09 LAB
ALBUMIN SERPL BCP-MCNC: 3.5 G/DL (ref 3.5–5.2)
ALP SERPL-CCNC: 47 U/L (ref 55–135)
ALT SERPL W/O P-5'-P-CCNC: 9 U/L (ref 10–44)
AMORPH CRY UR QL COMP ASSIST: NORMAL
ANION GAP SERPL CALC-SCNC: 8 MMOL/L (ref 8–16)
AST SERPL-CCNC: 12 U/L (ref 10–40)
BILIRUB SERPL-MCNC: 0.5 MG/DL (ref 0.1–1)
BILIRUB UR QL STRIP: NEGATIVE
BUN SERPL-MCNC: 12 MG/DL (ref 6–20)
CALCIUM SERPL-MCNC: 8.8 MG/DL (ref 8.7–10.5)
CHLORIDE SERPL-SCNC: 105 MMOL/L (ref 95–110)
CHOLEST SERPL-MCNC: 157 MG/DL (ref 120–199)
CHOLEST/HDLC SERPL: 3.7 {RATIO} (ref 2–5)
CLARITY UR REFRACT.AUTO: ABNORMAL
CO2 SERPL-SCNC: 28 MMOL/L (ref 23–29)
COLOR UR AUTO: ABNORMAL
CREAT SERPL-MCNC: 0.7 MG/DL (ref 0.5–1.4)
EST. GFR  (NO RACE VARIABLE): >60 ML/MIN/1.73 M^2
ESTIMATED AVG GLUCOSE: 108 MG/DL (ref 68–131)
GLUCOSE SERPL-MCNC: 99 MG/DL (ref 70–110)
GLUCOSE UR QL STRIP: NEGATIVE
HBA1C MFR BLD: 5.4 % (ref 4–5.6)
HDLC SERPL-MCNC: 42 MG/DL (ref 40–75)
HDLC SERPL: 26.8 % (ref 20–50)
HGB UR QL STRIP: ABNORMAL
KETONES UR QL STRIP: NEGATIVE
LDLC SERPL CALC-MCNC: 105.8 MG/DL (ref 63–159)
LEUKOCYTE ESTERASE UR QL STRIP: ABNORMAL
MICROSCOPIC COMMENT: NORMAL
NITRITE UR QL STRIP: NEGATIVE
NONHDLC SERPL-MCNC: 115 MG/DL
PH UR STRIP: 6 [PH] (ref 5–8)
POTASSIUM SERPL-SCNC: 3.6 MMOL/L (ref 3.5–5.1)
PROT SERPL-MCNC: 7.1 G/DL (ref 6–8.4)
PROT UR QL STRIP: ABNORMAL
RBC #/AREA URNS AUTO: 0 /HPF (ref 0–4)
SODIUM SERPL-SCNC: 141 MMOL/L (ref 136–145)
SP GR UR STRIP: 1.03 (ref 1–1.03)
SQUAMOUS #/AREA URNS AUTO: 23 /HPF
TRIGL SERPL-MCNC: 46 MG/DL (ref 30–150)
URN SPEC COLLECT METH UR: ABNORMAL
WBC #/AREA URNS AUTO: 0 /HPF (ref 0–5)

## 2024-07-09 PROCEDURE — 80053 COMPREHEN METABOLIC PANEL: CPT | Performed by: FAMILY MEDICINE

## 2024-07-09 PROCEDURE — 36415 COLL VENOUS BLD VENIPUNCTURE: CPT | Performed by: FAMILY MEDICINE

## 2024-07-09 PROCEDURE — 81001 URINALYSIS AUTO W/SCOPE: CPT

## 2024-07-09 PROCEDURE — 80061 LIPID PANEL: CPT | Performed by: FAMILY MEDICINE

## 2024-07-09 PROCEDURE — 83036 HEMOGLOBIN GLYCOSYLATED A1C: CPT | Performed by: FAMILY MEDICINE

## 2024-07-10 ENCOUNTER — E-VISIT (OUTPATIENT)
Dept: INTERNAL MEDICINE | Facility: CLINIC | Age: 44
End: 2024-07-10
Payer: COMMERCIAL

## 2024-07-10 DIAGNOSIS — R30.0 DYSURIA: Primary | ICD-10-CM

## 2024-07-10 RX ORDER — CIPROFLOXACIN 500 MG/1
500 TABLET ORAL 2 TIMES DAILY
Qty: 6 TABLET | Refills: 0 | Status: SHIPPED | OUTPATIENT
Start: 2024-07-10 | End: 2024-07-13

## 2024-07-10 NOTE — PROGRESS NOTES
Patient ID: Judie Munoz is a 43 y.o. female.    Chief Complaint: Urinary Tract Infection (Entered automatically based on patient selection in [facility].)    The patient initiated a request through [facility] on 7/10/2024 for evaluation and management with a chief complaint of Urinary Tract Infection (Entered automatically based on patient selection in [facility].)     I evaluated the questionnaire submission on 7/10/24.    [facility] P[facility] Evisit Uti Questionnaire    7/10/2024  9:09 AM CDT - Filed by Patient   Do you agree to participate in an E-Visit? Yes   If you have any of the following symptoms, please present to your local emergency room or call 911:  I acknowledge   Are you pregnant, could you be pregnant, or are you breast feeding? None of the above   What is the main issue you would like addressed today? Urinary pain   What symptoms do you currently have? Pain while passing urine   When did your symptoms first appear? 7/7/2024   List what you have done or taken to help your symptoms. Over the counter meds   Please indicate whether you have had the following symptoms during the past 24 hours     Urgent urination (a sudden and uncontrollable urge to urinate) Severe   Frequent urination of small amounts of urine (going to the toilet very often) Moderate   Burning pain when urinating None   Incomplete bladder emptying (still feel like you need to urinate again after urination) Severe   Pain not associated with urination in the lower abdomen below the belly button) Severe   What does your urine look like? Cloudy   Blood seen in the urine None   Flank pain (pain in one or both sides of the lower back) Severe   Abnormal Vaginal Discharge (abnormal amount, color and/or odor) None   Discharge from the urethra (urinary opening) without urination None   High body temperature/fever? None-<99.5   Please rate how much discomfort you have experience because of the symptoms in the past 24 hours: Severe   Please indicate how the  symptoms have interfered with your every day activities/work in the past 24 hours: Severe   Please indicate how these symptoms have interfered with your social activities (visiting people, meeting with friends, etc.) in the past 24 hours? Severe   Are you a diabetic? No   Please indicate whether you have the following at the time of completion of this questionnaire: None of the above   Provide any additional information you feel is important.    Please attach any relevant images or files (if you have performed a home test for UTI, please submit a photo of results)    Are you able to take your vital signs? No         Encounter Diagnosis   Name Primary?    Dysuria Yes        No orders of the defined types were placed in this encounter.           No follow-ups on file.      E-Visit Time Tracking:

## 2024-12-18 RX ORDER — BUTALBITAL, ACETAMINOPHEN AND CAFFEINE 50; 325; 40 MG/1; MG/1; MG/1
1 TABLET ORAL EVERY 8 HOURS PRN
Qty: 30 TABLET | Refills: 0 | Status: SHIPPED | OUTPATIENT
Start: 2024-12-18

## 2024-12-18 NOTE — TELEPHONE ENCOUNTER
Refill Routing Note   Medication(s) are not appropriate for processing by [facility] for the following reason(s):        Outside of protocol    [facility] action(s):  Route               Appointments  past 12m or future 3m with PCP    Date Provider   Last Visit   7/10/2024 Ritika Tong MD   Next Visit   Visit date not found Ritika Tong MD   ED visits in past 90 days: 0        Note composed:11:50 AM 12/18/2024

## 2024-12-26 ENCOUNTER — OFFICE VISIT (OUTPATIENT)
Dept: OBSTETRICS AND GYNECOLOGY | Facility: CLINIC | Age: 44
End: 2024-12-26
Payer: COMMERCIAL

## 2024-12-26 ENCOUNTER — LAB VISIT (OUTPATIENT)
Dept: LAB | Facility: HOSPITAL | Age: 44
End: 2024-12-26
Attending: NURSE PRACTITIONER
Payer: COMMERCIAL

## 2024-12-26 VITALS
DIASTOLIC BLOOD PRESSURE: 72 MMHG | WEIGHT: 200.81 LBS | HEIGHT: 67 IN | BODY MASS INDEX: 31.52 KG/M2 | SYSTOLIC BLOOD PRESSURE: 120 MMHG

## 2024-12-26 DIAGNOSIS — Z11.3 SCREEN FOR STD (SEXUALLY TRANSMITTED DISEASE): ICD-10-CM

## 2024-12-26 DIAGNOSIS — Z11.3 SCREEN FOR STD (SEXUALLY TRANSMITTED DISEASE): Primary | ICD-10-CM

## 2024-12-26 LAB
HAV IGM SERPL QL IA: NORMAL
HBV CORE IGM SERPL QL IA: NORMAL
HBV SURFACE AG SERPL QL IA: NORMAL
HCV AB SERPL QL IA: NORMAL
HIV 1+2 AB+HIV1 P24 AG SERPL QL IA: NORMAL

## 2024-12-26 PROCEDURE — 87591 N.GONORRHOEAE DNA AMP PROB: CPT | Performed by: NURSE PRACTITIONER

## 2024-12-26 PROCEDURE — 0352U VAGINOSIS SCREEN BY DNA PROBE: CPT | Performed by: NURSE PRACTITIONER

## 2024-12-26 PROCEDURE — 3008F BODY MASS INDEX DOCD: CPT | Mod: CPTII,S$GLB,, | Performed by: NURSE PRACTITIONER

## 2024-12-26 PROCEDURE — 99999 PR PBB SHADOW E&M-EST. PATIENT-LVL III: CPT | Mod: PBBFAC,,, | Performed by: NURSE PRACTITIONER

## 2024-12-26 PROCEDURE — 86593 SYPHILIS TEST NON-TREP QUANT: CPT | Performed by: NURSE PRACTITIONER

## 2024-12-26 PROCEDURE — 87389 HIV-1 AG W/HIV-1&-2 AB AG IA: CPT | Performed by: NURSE PRACTITIONER

## 2024-12-26 PROCEDURE — 3078F DIAST BP <80 MM HG: CPT | Mod: CPTII,S$GLB,, | Performed by: NURSE PRACTITIONER

## 2024-12-26 PROCEDURE — 1160F RVW MEDS BY RX/DR IN RCRD: CPT | Mod: CPTII,S$GLB,, | Performed by: NURSE PRACTITIONER

## 2024-12-26 PROCEDURE — 1159F MED LIST DOCD IN RCRD: CPT | Mod: CPTII,S$GLB,, | Performed by: NURSE PRACTITIONER

## 2024-12-26 PROCEDURE — 99213 OFFICE O/P EST LOW 20 MIN: CPT | Mod: S$GLB,,, | Performed by: NURSE PRACTITIONER

## 2024-12-26 PROCEDURE — 80074 ACUTE HEPATITIS PANEL: CPT | Performed by: NURSE PRACTITIONER

## 2024-12-26 PROCEDURE — 3044F HG A1C LEVEL LT 7.0%: CPT | Mod: CPTII,S$GLB,, | Performed by: NURSE PRACTITIONER

## 2024-12-26 PROCEDURE — 3074F SYST BP LT 130 MM HG: CPT | Mod: CPTII,S$GLB,, | Performed by: NURSE PRACTITIONER

## 2024-12-26 PROCEDURE — 36415 COLL VENOUS BLD VENIPUNCTURE: CPT | Performed by: NURSE PRACTITIONER

## 2024-12-26 NOTE — PROGRESS NOTES
Subjective:       Patient ID: Judie Munoz is a 44 y.o. female.    Chief Complaint:  hard knot on vagina   No LMP recorded. Patient has had an implant.  History of Present Illness  Satisfied with IUD as method of birth control   Over the past 4 days she has felt a hard knot in vaginal area since that time the knot has decreased in size but she can still feel it a little bit.  Desires std screening (including trich) and IUD string check   No vaginal/pelvic complaints     OB History    Para Term  AB Living   2 2 2         SAB IAB Ectopic Multiple Live Births                  # Outcome Date GA Lbr Henrique/2nd Weight Sex Type Anes PTL Lv   2 Term            1 Term                Review of Systems  Review of Systems        Objective:    Physical Exam  Genitourinary:     General: Normal vulva.      Exam position: Lithotomy position.      Vagina: Foreign body present. No signs of injury. No vaginal discharge, erythema, tenderness, bleeding, lesions or prolapsed vaginal walls.      Cervix: No cervical motion tenderness, discharge, friability, lesion, erythema, cervical bleeding or eversion.         Assessment:     1. Screen for STD (sexually transmitted disease)              Plan:   Judie was seen today for well woman.    Diagnoses and all orders for this visit:    Screen for STD (sexually transmitted disease)  -     HIV 1/2 Ag/Ab (4th Gen); Future  -     Treponema Pallidium Antibodies IgG, IgM; Future  -     Hepatitis Panel, Acute; Future  -     C. trachomatis/N. gonorrhoeae by AMP DNA [facility]; Vagina  -     Vaginosis Screen by DNA Probe; Future  -     Vaginosis Screen by DNA Probe      Knot no longer present recommend return to clinic if knot reappears

## 2024-12-27 ENCOUNTER — PATIENT MESSAGE (OUTPATIENT)
Dept: OBSTETRICS AND GYNECOLOGY | Facility: CLINIC | Age: 44
End: 2024-12-27
Payer: COMMERCIAL

## 2024-12-27 DIAGNOSIS — B96.89 BACTERIAL VAGINOSIS: Primary | ICD-10-CM

## 2024-12-27 DIAGNOSIS — N76.0 BACTERIAL VAGINOSIS: Primary | ICD-10-CM

## 2024-12-27 LAB
BACTERIAL VAGINOSIS DNA: DETECTED
C TRACH DNA SPEC QL NAA+PROBE: NOT DETECTED
CANDIDA GLABRATA/KRUSEI: NOT DETECTED
CANDIDA RRNA VAG QL PROBE: NOT DETECTED
N GONORRHOEA DNA SPEC QL NAA+PROBE: NOT DETECTED
TREPONEMA PALLIDUM IGG+IGM AB [PRESENCE] IN SERUM OR PLASMA BY IMMUNOASSAY: NONREACTIVE
TRICHOMONAS VAGINALIS: NOT DETECTED

## 2024-12-27 RX ORDER — METRONIDAZOLE 500 MG/1
500 TABLET ORAL EVERY 12 HOURS
Qty: 14 TABLET | Refills: 0 | Status: SHIPPED | OUTPATIENT
Start: 2024-12-27 | End: 2025-01-03

## 2025-01-29 ENCOUNTER — PATIENT MESSAGE (OUTPATIENT)
Dept: OBSTETRICS AND GYNECOLOGY | Facility: CLINIC | Age: 45
End: 2025-01-29
Payer: COMMERCIAL

## 2025-01-30 DIAGNOSIS — N89.8 VAGINAL ODOR: Primary | ICD-10-CM

## 2025-01-30 RX ORDER — METRONIDAZOLE 500 MG/1
500 TABLET ORAL EVERY 12 HOURS
Qty: 14 TABLET | Refills: 0 | Status: SHIPPED | OUTPATIENT
Start: 2025-01-30 | End: 2025-02-06

## 2025-04-08 ENCOUNTER — OFFICE VISIT (OUTPATIENT)
Dept: INTERNAL MEDICINE | Facility: CLINIC | Age: 45
End: 2025-04-08
Payer: COMMERCIAL

## 2025-04-08 VITALS — HEIGHT: 67 IN | BODY MASS INDEX: 31.46 KG/M2

## 2025-04-08 DIAGNOSIS — N30.00 ACUTE CYSTITIS WITHOUT HEMATURIA: Primary | ICD-10-CM

## 2025-04-08 RX ORDER — NITROFURANTOIN 25; 75 MG/1; MG/1
100 CAPSULE ORAL 2 TIMES DAILY
Qty: 10 CAPSULE | Refills: 0 | Status: SHIPPED | OUTPATIENT
Start: 2025-04-08 | End: 2025-04-13

## 2025-04-08 RX ORDER — PHENAZOPYRIDINE HYDROCHLORIDE 100 MG/1
100 TABLET, FILM COATED ORAL 3 TIMES DAILY PRN
Qty: 10 TABLET | Refills: 0 | Status: SHIPPED | OUTPATIENT
Start: 2025-04-08 | End: 2025-04-18

## 2025-04-12 ENCOUNTER — PATIENT MESSAGE (OUTPATIENT)
Dept: INTERNAL MEDICINE | Facility: CLINIC | Age: 45
End: 2025-04-12
Payer: COMMERCIAL

## 2025-04-14 ENCOUNTER — OFFICE VISIT (OUTPATIENT)
Dept: INTERNAL MEDICINE | Facility: CLINIC | Age: 45
End: 2025-04-14
Payer: COMMERCIAL

## 2025-04-14 DIAGNOSIS — N30.00 ACUTE CYSTITIS WITHOUT HEMATURIA: Primary | ICD-10-CM

## 2025-04-14 PROCEDURE — 98005 SYNCH AUDIO-VIDEO EST LOW 20: CPT | Mod: 95,,, | Performed by: FAMILY MEDICINE

## 2025-04-14 RX ORDER — NITROFURANTOIN 25; 75 MG/1; MG/1
100 CAPSULE ORAL 2 TIMES DAILY
Qty: 10 CAPSULE | Refills: 0 | Status: SHIPPED | OUTPATIENT
Start: 2025-04-14

## 2025-04-14 NOTE — PROGRESS NOTES
Judie Munoz  04/14/2025  [id number]    Ritika Tong MD  Patient Care Team:  Ritika Tong MD as PCP - General (Family Medicine)  Lilibeth Callejas LPN as Care Coordinator (Internal Medicine)    The patient location is: home  The chief complaint leading to consultation is: UTI    Visit type: audiovisual    Face to Face time with patient: 2:20  10 minutes of total time spent on the encounter, which includes face to face time and non-face to face time preparing to see the patient (eg, review of tests), Obtaining and/or reviewing separately obtained history, Documenting clinical information in the electronic or other health record, Independently interpreting results (not separately reported) and communicating results to the patient/family/caregiver, or Care coordination (not separately reported).         Each patient to whom he or she provides medical services by telemedicine is:  (1) informed of the relationship between the physician and patient and the respective role of any other health care provider with respect to management of the patient; and (2) notified that he or she may decline to receive medical services by telemedicine and may withdraw from such care at any time.    Notes:         Visit Type:an urgent visit for a new problem    Chief Complaint:UTI  History of Present Illness:    History of Present Illness      Had a virtual visit- given Macrobid  She took for 5 days.  She feels that it help, but still not cleared    URINARY TRACT INFECTION:  She recently had a virtual visit with Dr. Gross for UTI symptoms and completed a 5-day course of Macrobid on Sunday morning. While symptoms have improved significantly, she continues to experience some discomfort suggesting incomplete resolution. She reports contributing factors include inadequate fluid intake and infrequent urination due to work-related travel between [place] and [place].      Answers submitted by the patient for this  visit:  Painful Urination Questionnaire (Submitted on 4/14/2025)  Chief Complaint: Dysuria  Chronicity: recurrent  Onset: in the past 7 days  Frequency: every urination  Progression since onset: gradually improving  Pain quality: aching, burning  Pain - numeric: 5/10  Fever: 100 - 100.9 F  Fever duration: 3 - 4 days  Sexually active?: Yes  History of pyelonephritis?: No  discharge: No  hesitancy: Yes  possible pregnancy: No  sweats: Yes  withholding: Yes  behavior changes: No  Treatments tried: acetaminophen, antibiotics, increased fluids, sitz baths  Improvement on treatment: moderate  Pain severity: mild  catheterization: No  diabetes insipidus: No  diabetes mellitus: No  genitourinary reflux: No  hypertension: Yes  recurrent UTIs: Yes  single kidney: No  STD: No  urinary stasis: No  urological procedure: No  kidney stones: No        The following were reviewed at this visit: active problem list, medication list, allergies, family history, social history, and health maintenance.  History:  Past Medical History:   Diagnosis Date    Hypertension      Past Surgical History:   Procedure Laterality Date    COLONOSCOPY N/A 4/18/2024    Procedure: COLONOSCOPY- possible hemorrhoid banding;  Surgeon: Jo Jones MD;  Location: [facility];  Service: Colon and Rectal;  Laterality: N/A;    D&C x 3       miscarriages     Problem List[1]    Medications:  Medications Ordered Prior to Encounter[2]    Medications have been reviewed and reconciled with patient at this visit.    Exam:  Wt Readings from Last 3 Encounters:   12/26/24 91.1 kg (200 lb 13.4 oz)   07/01/24 97.5 kg (214 lb 15.2 oz)   03/27/24 93.2 kg (205 lb 5.7 oz)     Temp Readings from Last 3 Encounters:   04/18/24 97 °F (36.1 °C)   03/27/24 98 °F (36.7 °C) (Oral)   02/07/24 97.3 °F (36.3 °C) (Tympanic)     BP Readings from Last 3 Encounters:   12/26/24 120/72   07/01/24 118/86   04/18/24 118/74     Pulse Readings from Last 3 Encounters:   07/01/24 84   04/18/24  85   03/27/24 89     There is no height or weight on file to calculate BMI.      Review of Systems   Constitutional:  Positive for chills. Negative for weight loss.   Gastrointestinal:  Positive for constipation. Negative for nausea and vomiting.   Genitourinary:  Positive for dysuria, flank pain, frequency and urgency. Negative for hematuria.   Skin:  Negative for rash.     Physical Exam  Nursing note reviewed.   Pulmonary:      Effort: Pulmonary effort is normal. No respiratory distress.   Neurological:      Mental Status: She is alert and oriented to person, place, and time.   Psychiatric:         Mood and Affect: Mood normal.         Behavior: Behavior normal.         Thought Content: Thought content normal.         Judgment: Judgment normal.       Physical Exam             Laboratory Reviewed ({Yes)    Lab Results   Component Value Date    WBC 8.13 03/05/2024    HGB 12.9 03/05/2024    HCT 39.2 03/05/2024     03/05/2024    CHOL 157 07/09/2024    TRIG 46 07/09/2024    HDL 42 07/09/2024    ALT 9 (L) 07/09/2024    AST 12 07/09/2024     07/09/2024    K 3.6 07/09/2024     07/09/2024    CREATININE 0.7 07/09/2024    BUN 12 07/09/2024    CO2 28 07/09/2024    TSH 2.201 03/05/2024    HGBA1C 5.4 07/09/2024       Assessment & Plan    N39.0 Urinary tract infection, site not specified    Previously treated for UTI symptoms with 5-day course of Macrobid, prescribed by Dr. Gross.  Symptoms improved but not fully resolved after initial treatment.  Continued Macrobid for an additional 5-day course.  If symptoms persist after 10 days of antibiotics, further investigation with urine culture may be necessary.  No history of antibiotic resistance noted in recent records.    URINARY TRACT INFECTION:  - Evaluated that the initial 5-day treatment with Macrobid has not completely cleared the UTI, as the patient still reports some discomfort.  - Assessed that if the extended treatment does not clear the UTI, further  investigation of the urine will be necessary.  - Checked for any history of positive cultures or antibiotic resistance.  - Prescribed an additional 5-day course of Macrobid to continue treatment.  - Explained that uncomplicated UTIs can be treated for 5-10 days depending on response.  - Advised the patient to hydrate well during the treatment course.  - Instructed the patient to contact the office if symptoms persist after completing the additional 5-day course of antibiotics.  - Noted that the patient was previously treated for UTI symptoms with a 5-day course of Macrobid, prescribed by Dr. Gross.  - Ms. Munoz to continue to hydrate with lots of water.       Diagnoses and all orders for this visit:    Acute cystitis without hematuria    Other orders  -     nitrofurantoin, macrocrystal-monohydrate, (MACROBID) 100 MG capsule; Take 1 capsule (100 mg total) by mouth 2 (two) times daily.                Care Plan/Goals: Reviewed     Follow up: No follow-ups on file.    After visit summary was printed and given to patient upon discharge today.  Patient goals and care plan are included in After Visit Summary.    This note was generated with the assistance of ambient listening technology. Verbal consent was obtained by the patient and accompanying visitor(s) for the recording of patient appointment to facilitate this note. I attest to having reviewed and edited the generated note for accuracy, though some syntax or spelling errors may persist. Please contact the author of this note for any clarification.            [1]   Patient Active Problem List  Diagnosis    Hypertension    Migraines    Thyroid nodule    Screening for colon cancer   [2]   Current Outpatient Medications on File Prior to Visit   Medication Sig Dispense Refill    amLODIPine (NORVASC) 5 MG tablet Take 1 tablet (5 mg total) by mouth once daily. 90 tablet 3    butalbital-acetaminophen-caffeine -40 mg (FIORICET, ESGIC) -40 mg per tablet Take 1  tablet by mouth every 8 (eight) hours as needed for Headaches. 30 tablet 0    hydroCHLOROthiazide (HYDRODIURIL) 25 MG tablet Take 1 tablet (25 mg total) by mouth once daily. 90 tablet 3    [] nitrofurantoin, macrocrystal-monohydrate, (MACROBID) 100 MG capsule Take 1 capsule (100 mg total) by mouth 2 (two) times daily. for 5 days 10 capsule 0    ondansetron (ZOFRAN-ODT) 4 MG TbDL Take 2 tablets (8 mg total) by mouth every 6 (six) hours as needed (nausea). 20 tablet 0    ondansetron (ZOFRAN-ODT) 4 MG TbDL Take 2 tablets (8 mg total) by mouth every 6 (six) hours as needed (prn). 15 tablet 0    phenazopyridine (PYRIDIUM) 100 MG tablet Take 1 tablet (100 mg total) by mouth 3 (three) times daily as needed for Pain. 10 tablet 0    phentermine (ADIPEX-P) 37.5 mg tablet Take 1 tablet (37.5 mg total) by mouth before breakfast. 30 tablet 2    topiramate (TOPAMAX) 25 MG tablet Take 1 tablet (25 mg total) by mouth 2 (two) times daily. 60 tablet 11     Current Facility-Administered Medications on File Prior to Visit   Medication Dose Route Frequency Provider Last Rate Last Admin    levonorgestreL (MIRENA) 21 mcg/24 hours (8 yrs) 52 mg IUD 1 Intra Uterine Device  1 Intra Uterine Device Intrauterine  Precious Camarena, NP   1 Intra Uterine Device at [id number]

## 2025-04-22 ENCOUNTER — OFFICE VISIT (OUTPATIENT)
Dept: URGENT CARE | Facility: CLINIC | Age: 45
End: 2025-04-22
Payer: COMMERCIAL

## 2025-04-22 VITALS
WEIGHT: 198.06 LBS | TEMPERATURE: 99 F | BODY MASS INDEX: 31.09 KG/M2 | HEART RATE: 95 BPM | SYSTOLIC BLOOD PRESSURE: 114 MMHG | DIASTOLIC BLOOD PRESSURE: 67 MMHG | OXYGEN SATURATION: 97 % | RESPIRATION RATE: 18 BRPM | HEIGHT: 67 IN

## 2025-04-22 DIAGNOSIS — R05.1 ACUTE COUGH: ICD-10-CM

## 2025-04-22 DIAGNOSIS — R07.0 THROAT PAIN: ICD-10-CM

## 2025-04-22 DIAGNOSIS — R09.81 NASAL SINUS CONGESTION: Primary | ICD-10-CM

## 2025-04-22 DIAGNOSIS — J06.9 UPPER RESPIRATORY TRACT INFECTION, UNSPECIFIED TYPE: ICD-10-CM

## 2025-04-22 DIAGNOSIS — R09.82 PND (POST-NASAL DRIP): ICD-10-CM

## 2025-04-22 DIAGNOSIS — R68.83 CHILLS: ICD-10-CM

## 2025-04-22 LAB
CTP QC/QA: YES
MOLECULAR STREP A: NEGATIVE
POC MOLECULAR INFLUENZA A AGN: NEGATIVE
POC MOLECULAR INFLUENZA B AGN: NEGATIVE
SARS CORONAVIRUS 2 ANTIGEN: NEGATIVE

## 2025-04-22 PROCEDURE — 87811 SARS-COV-2 COVID19 W/OPTIC: CPT | Mod: QW,S$GLB,, | Performed by: NURSE PRACTITIONER

## 2025-04-22 PROCEDURE — 87651 STREP A DNA AMP PROBE: CPT | Mod: QW,S$GLB,, | Performed by: NURSE PRACTITIONER

## 2025-04-22 PROCEDURE — 87502 INFLUENZA DNA AMP PROBE: CPT | Mod: QW,S$GLB,, | Performed by: NURSE PRACTITIONER

## 2025-04-22 PROCEDURE — 99214 OFFICE O/P EST MOD 30 MIN: CPT | Mod: S$GLB,,, | Performed by: NURSE PRACTITIONER

## 2025-04-22 RX ORDER — PHENOL 1.4 %
AEROSOL, SPRAY (ML) MUCOUS MEMBRANE
Qty: 177 ML | Refills: 0 | Status: SHIPPED | OUTPATIENT
Start: 2025-04-22

## 2025-04-22 RX ORDER — DEXTROMETHORPHAN HYDROBROMIDE, GUAIFENESIN AND PHENYLEPHRINE HYDROCHLORIDE 15; 400; 10 MG/1; MG/1; MG/1
1 TABLET ORAL EVERY 6 HOURS
Qty: 30 TABLET | Refills: 0 | Status: SHIPPED | OUTPATIENT
Start: 2025-04-22 | End: 2025-05-02

## 2025-04-22 RX ORDER — BENZONATATE 200 MG/1
200 CAPSULE ORAL 3 TIMES DAILY PRN
Qty: 25 CAPSULE | Refills: 0 | Status: SHIPPED | OUTPATIENT
Start: 2025-04-22 | End: 2025-05-02

## 2025-04-22 NOTE — PROGRESS NOTES
"Subjective:      Patient ID: Judie Munoz is a 44 y.o. female.    Vitals:  height is 5' 7" (1.702 m) and weight is 89.8 kg (198 lb 1.3 oz). Her tympanic temperature is 99 °F (37.2 °C). Her blood pressure is 114/67 and her pulse is 95. Her respiration is 18 and oxygen saturation is 97%.     Chief Complaint: Sore Throat    Patient presents with hoarseness, sore throat, sinus congestion, cough.. Symptoms started yesterday. Tylenol Cold and Flu taken with mild relief.      Sore Throat   This is a new problem. The current episode started yesterday. The problem has been gradually worsening. There has been no fever. Associated symptoms include congestion and coughing. Pertinent negatives include no diarrhea, ear pain, headaches, shortness of breath, stridor or vomiting.       Constitution: Positive for chills and fever.   HENT:  Positive for congestion, postnasal drip and sore throat. Negative for ear pain.    Respiratory:  Positive for cough. Negative for sputum production, shortness of breath, stridor and wheezing.    Gastrointestinal:  Negative for vomiting and diarrhea.   Skin:  Negative for rash.   Neurological:  Negative for headaches.      Objective:     Physical Exam   Constitutional: She is oriented to person, place, and time. She appears well-developed. She is cooperative.  Non-toxic appearance. She does not appear ill. No distress.   HENT:   Head: Normocephalic and atraumatic.   Ears:   Right Ear: Hearing and external ear normal.   Left Ear: Hearing and external ear normal.   Nose: Rhinorrhea and congestion present. No mucosal edema or nasal deformity. No epistaxis. Right sinus exhibits no maxillary sinus tenderness and no frontal sinus tenderness. Left sinus exhibits no maxillary sinus tenderness and no frontal sinus tenderness.   Mouth/Throat: Uvula is midline and mucous membranes are normal. No trismus in the jaw. Normal dentition. No uvula swelling. Posterior oropharyngeal erythema present. No " oropharyngeal exudate or posterior oropharyngeal edema.   Eyes: Conjunctivae and lids are normal. No scleral icterus.   Neck: Trachea normal and phonation normal. Neck supple. No edema present. No erythema present. No neck rigidity present.   Cardiovascular: Normal rate, regular rhythm, normal heart sounds and normal pulses.   Pulmonary/Chest: Effort normal and breath sounds normal. No respiratory distress. She has no decreased breath sounds. She has no wheezes. She has no rhonchi. She has no rales.   Abdominal: Normal appearance.   Musculoskeletal: Normal range of motion.         General: No deformity. Normal range of motion.   Neurological: She is alert and oriented to person, place, and time. She exhibits normal muscle tone. Coordination normal.   Skin: Skin is warm, dry, intact, not diaphoretic and not pale.   Psychiatric: Her speech is normal and behavior is normal. Judgment and thought content normal.   Nursing note and vitals reviewed.      Assessment:     1. Nasal sinus congestion    2. Throat pain    3. Chills    4. PND (post-nasal drip)    5. Acute cough    6. Upper respiratory tract infection, unspecified type        Plan:       Nasal sinus congestion  -     POCT Influenza A/B MOLECULAR  -     SARS Coronavirus 2 Antigen, POCT Manual Read    Throat pain  -     POCT Strep A, Molecular    Chills    PND (post-nasal drip)    Acute cough    Upper respiratory tract infection, unspecified type    Other orders  -     phenylephrine-DM-guaiFENesin (AQUANAZ) 10- mg Tab; Take 1 tablet by mouth every 6 (six) hours. for 10 days  Dispense: 30 tablet; Refill: 0  -     benzonatate (TESSALON) 200 MG capsule; Take 1 capsule (200 mg total) by mouth 3 (three) times daily as needed for Cough.  Dispense: 25 capsule; Refill: 0  -     phenoL (CHLORASEPTIC THROAT SPRAY) 1.4 % SprA; by Mucous Membrane route every 2 (two) hours.  Dispense: 177 mL; Refill: 0          Medical Decision Making:   Initial Assessment:   After  complete evaluation, including thorough history and physical exam, the patient's symptoms are most likely due to viral upper respiratory infection. There are no concerning features on physical exam to suggest bacterial otitis media/externa, sinusitis, strep pharyngitis, or peritonsillar abscess. Vital signs do not suggest sepsis. Lung sounds are clear and not consistent with pneumonia. There is no neck pain or limited ROM to suggest retropharyngeal abscess or meningitis. In clinic testing for covid/flu/strep is negative.The patient will be treated with supportive care. Will provide RX for tessalon upon D/C. Follow up instructions and ED precautions provided.              Results for orders placed or performed in visit on 04/22/25   POCT Influenza A/B MOLECULAR    Collection Time: 04/22/25  9:57 AM   Result Value Ref Range    POC Molecular Influenza A Ag Negative Negative    POC Molecular Influenza B Ag Negative Negative     Acceptable Yes    SARS Coronavirus 2 Antigen, POCT Manual Read    Collection Time: 04/22/25  9:57 AM   Result Value Ref Range    SARS Coronavirus 2 Antigen Negative Negative, Presumptive Negative     Acceptable Yes    POCT Strep A, Molecular    Collection Time: 04/22/25 10:10 AM   Result Value Ref Range    Molecular Strep A, POC Negative Negative     Acceptable Yes      Patient Instructions   A cold is caused by a virus that can settle in your nose, throat or lungs. This causesa runny or stuffy nose and sneezing. You may also have a sore throat, cough, headache, fever and muscle aches. Different cold viruses last different lengthsof time, but the average time is 2 to 14 days.    Seek immediate medical care if you develop fever, chest pain, or shortness of breath.     Treatment: There is no cure for the common cold, there is only symptomatic care.     Antibiotics may be used to treat signs of a secondary infection, but they do not treat  the cold virus.  Try these tips to keep yourself comfortable:                   -Get plenty of rest.                   -Drink plenty of fluids, at least 8 large glasses of fluid a day. Good fluid choices are water, fruit juices high in Vitamin C, tea, gelatin, or broths and soups. These help to keep mucus thin and ease congestion.                  -Use salt water gargle, cough drops or throat sprays to relieve throat pain. Mi ¼ to ½ teaspoon of salt in 1 cup of warm water for a salt water gargle  solution.                  -Use petroleum jelly or lip balm around lips and nose to prevent chapping.                  -Use saline nose drops or spray to help ease congestion.    Over the Counter (OTC) Medicines:  Take over the counter medicines as needed to ease your signs.  Read labels carefully.  Use a product that treats only the signs that you have. Ask your pharmacist  for recommendations. Be sure to ask about possible interactions with other  medicines you are taking.  Common medicines used to treat signs of a cold include:     -Flonase daily.  -Claritin or Zyrtec daily.  -Mucinex every 12 hours -- Drink plenty of water while taking this medication.    - Cough suppressant, also called antitussive, such as dextromethorphan.  This medicine decreases your reflex and sensitivity to cough. This  medicine may be kept behind the pharmacy counter for purchase.    Cold and cough medicines often contain more than one type of medicine.  Ask the pharmacist for help to confirm that you are not using more than one  product with the same or similar ingredient. For example, some cold and  cough medicines have acetaminophen or ibuprofen in them to help lower a  fever or ease muscle aches. Do not take extra acetaminophen (Tylenol) or  ibuprofen (Advil, Motrin) if the cold or cough medicine has it as an  ingredient. Too much medicine could be harmful.    Take the correct dose as listed on the package. Do not take more than  recommended.    Use a  Humidifier:  A cool mist humidifier can make breathing easier by thinning mucus. Do not use  a steam humidifier as hot water can cause burns if spilled.  Place the humidifier a few feet from the bed. Drain and clean each day with  soap and water to prevent bacteria and mold from growing.  Indoor humidity should not be above 50%. Stop using the humidifier if you  notice moisture on windows, walls or pictures.  You do not need to add any medicine to the humidifier.  If you cannot get a humidifier, place a pan of water next to heating vents and  refill the water level daily. The water will evaporate and add moisture to the  Room.    How to prevent the spread of colds  -Wash your hands with soap and water or use alcohol based hand   often. Dry hands wet from washing with soap on a paper towel instead of cloth towel.  -Cough or sneeze into your elbow to avoid spreading germs.  -Wipe down common surfaces, such as door knobs and faucet handles, with a disinfectant spray.  -Do not share cups or utensils.        Please follow up with your Primary care provider within 2-5 days if your signs and symptoms have not resolved or worsen.      If your condition worsens or fails to improve we recommend that you receive another evaluation at the emergency room immediately or contact your primary medical clinic to discuss your concerns.   You must understand that you have received an Urgent Care treatment only and that you may be released before all of your medical problems are known or treated. You, the patient, will arrange for follow up care as instructed.

## 2025-04-22 NOTE — LETTER
April 22, 2025      [facility]  [address], [address]  [facility] [address]  Phone: [phone]  Fax: [phone]       Patient: Judie Munoz   YOB: 1980  Date of Visit: 04/22/2025    To Whom It May Concern:    Katie Munoz  was at [facility] on 04/22/2025. The patient may return to work/school on 4/24/25 with no restrictions. If you have any questions or concerns, or if I can be of further assistance, please do not hesitate to contact me.    Sincerely,      Sydney Singletary, NP

## 2025-04-24 ENCOUNTER — OFFICE VISIT (OUTPATIENT)
Dept: INTERNAL MEDICINE | Facility: CLINIC | Age: 45
End: 2025-04-24
Payer: COMMERCIAL

## 2025-04-24 DIAGNOSIS — R05.9 COUGH, UNSPECIFIED TYPE: Primary | ICD-10-CM

## 2025-04-24 PROCEDURE — 98005 SYNCH AUDIO-VIDEO EST LOW 20: CPT | Mod: 95,,, | Performed by: FAMILY MEDICINE

## 2025-04-24 RX ORDER — ALBUTEROL SULFATE 90 UG/1
2 AEROSOL, METERED RESPIRATORY (INHALATION) EVERY 4 HOURS PRN
Qty: 18 G | Refills: 0 | Status: SHIPPED | OUTPATIENT
Start: 2025-04-24 | End: 2026-04-24

## 2025-04-24 RX ORDER — METHYLPREDNISOLONE 4 MG/1
TABLET ORAL
Qty: 21 EACH | Refills: 0 | Status: SHIPPED | OUTPATIENT
Start: 2025-04-24 | End: 2025-05-15

## 2025-04-24 RX ORDER — PROMETHAZINE HYDROCHLORIDE AND DEXTROMETHORPHAN HYDROBROMIDE 6.25; 15 MG/5ML; MG/5ML
5 SYRUP ORAL EVERY 4 HOURS PRN
Qty: 118 ML | Refills: 0 | Status: SHIPPED | OUTPATIENT
Start: 2025-04-24 | End: 2025-05-04

## 2025-04-24 NOTE — PROGRESS NOTES
Judie Munoz  04/24/2025  [id number]    Ritika Tong MD  Patient Care Team:  Ritika Tong MD as PCP - General (Family Medicine)  Lilibeth Callejas LPN as Care Coordinator (Internal Medicine)    The patient location is: work  The chief complaint leading to consultation is: sick    Visit type: audiovisual    Face to Face time with patient: 12:44  15 minutes of total time spent on the encounter, which includes face to face time and non-face to face time preparing to see the patient (eg, review of tests), Obtaining and/or reviewing separately obtained history, Documenting clinical information in the electronic or other health record, Independently interpreting results (not separately reported) and communicating results to the patient/family/caregiver, or Care coordination (not separately reported).         Each patient to whom he or she provides medical services by telemedicine is:  (1) informed of the relationship between the physician and patient and the respective role of any other health care provider with respect to management of the patient; and (2) notified that he or she may decline to receive medical services by telemedicine and may withdraw from such care at any time.    Notes:        Visit Type:an urgent visit for a new problem    Chief Complaint:  No chief complaint on file.      History of Present Illness:    History of Present Illness             44 year old   Virtual visit for cough    Went to urgent care  Strep Flu and COVID negative  Given Tessalon and cough meds.    She said she isn't getting better  Cough makes he chest hurt.    She didn't have wheezes.  She said cough is causing migraine.    No SOB.      Answers submitted by the patient for this visit:  Cough Questionnaire (Submitted on 4/24/2025)  Chief Complaint: Cough  Chronicity: new  Onset: yesterday  Progression since onset: rapidly worsening  Frequency: every few minutes  Cough characteristics: productive of  sputum  sweats: Yes  Aggravated by: cold air, lying down  Improvement on treatment: no relief      The following were reviewed at this visit: active problem list, medication list, allergies, family history, social history, and health maintenance.  History:  Past Medical History:   Diagnosis Date    Hypertension      Past Surgical History:   Procedure Laterality Date    COLONOSCOPY N/A 4/18/2024    Procedure: COLONOSCOPY- possible hemorrhoid banding;  Surgeon: Jo Jones MD;  Location: [facility];  Service: Colon and Rectal;  Laterality: N/A;    D&C x 3       miscarriages     Problem List[1]    Medications:  Medications Ordered Prior to Encounter[2]    Medications have been reviewed and reconciled with patient at this visit.    Exam:  Wt Readings from Last 3 Encounters:   04/22/25 89.8 kg (198 lb 1.3 oz)   12/26/24 91.1 kg (200 lb 13.4 oz)   07/01/24 97.5 kg (214 lb 15.2 oz)     Temp Readings from Last 3 Encounters:   04/22/25 99 °F (37.2 °C) (Tympanic)   04/18/24 97 °F (36.1 °C)   03/27/24 98 °F (36.7 °C) (Oral)     BP Readings from Last 3 Encounters:   04/22/25 114/67   12/26/24 120/72   07/01/24 118/86     Pulse Readings from Last 3 Encounters:   04/22/25 95   07/01/24 84   04/18/24 85     There is no height or weight on file to calculate BMI.      Review of Systems   Constitutional:  Positive for chills.   HENT:  Positive for congestion.    Respiratory:  Positive for cough and wheezing.    Cardiovascular:  Positive for chest pain.   Neurological:  Positive for headaches.     Physical Exam  Nursing note reviewed.   Pulmonary:      Effort: Pulmonary effort is normal. No respiratory distress.   Neurological:      Mental Status: She is alert and oriented to person, place, and time.   Psychiatric:         Mood and Affect: Mood normal.         Behavior: Behavior normal.         Thought Content: Thought content normal.         Judgment: Judgment normal.       Physical Exam             Laboratory Reviewed  ({Yes)    Lab Results   Component Value Date    WBC 8.13 03/05/2024    HGB 12.9 03/05/2024    HCT 39.2 03/05/2024     03/05/2024    CHOL 157 07/09/2024    TRIG 46 07/09/2024    HDL 42 07/09/2024    ALT 9 (L) 07/09/2024    AST 12 07/09/2024     07/09/2024    K 3.6 07/09/2024     07/09/2024    CREATININE 0.7 07/09/2024    BUN 12 07/09/2024    CO2 28 07/09/2024    TSH 2.201 03/05/2024    HGBA1C 5.4 07/09/2024       Assessment & Plan            Diagnoses and all orders for this visit:    Cough, unspecified type    Other orders  -     methylPREDNISolone (MEDROL DOSEPACK) 4 mg tablet; use as directed  -     promethazine-dextromethorphan (PROMETHAZINE-DM) 6.25-15 mg/5 mL Syrp; Take 5 mLs by mouth every 4 (four) hours as needed (cough).  -     albuterol (VENTOLIN HFA) 90 mcg/actuation inhaler; Inhale 2 puffs into the lungs every 4 (four) hours as needed for Wheezing. Rescue                Care Plan/Goals: Reviewed     Follow up: No follow-ups on file.    After visit summary was printed and given to patient upon discharge today.  Patient goals and care plan are included in After Visit Summary.    This note was generated with the assistance of ambient listening technology. Verbal consent was obtained by the patient and accompanying visitor(s) for the recording of patient appointment to facilitate this note. I attest to having reviewed and edited the generated note for accuracy, though some syntax or spelling errors may persist. Please contact the author of this note for any clarification.            [1]   Patient Active Problem List  Diagnosis    Hypertension    Migraines    Thyroid nodule    Screening for colon cancer   [2]   Current Outpatient Medications on File Prior to Visit   Medication Sig Dispense Refill    amLODIPine (NORVASC) 5 MG tablet Take 1 tablet (5 mg total) by mouth once daily. 90 tablet 3    benzonatate (TESSALON) 200 MG capsule Take 1 capsule (200 mg total) by mouth 3 (three) times daily  as needed for Cough. 25 capsule 0    butalbital-acetaminophen-caffeine -40 mg (FIORICET, ESGIC) -40 mg per tablet Take 1 tablet by mouth every 8 (eight) hours as needed for Headaches. 30 tablet 0    hydroCHLOROthiazide (HYDRODIURIL) 25 MG tablet Take 1 tablet (25 mg total) by mouth once daily. 90 tablet 3    nitrofurantoin, macrocrystal-monohydrate, (MACROBID) 100 MG capsule Take 1 capsule (100 mg total) by mouth 2 (two) times daily. 10 capsule 0    ondansetron (ZOFRAN-ODT) 4 MG TbDL Take 2 tablets (8 mg total) by mouth every 6 (six) hours as needed (nausea). 20 tablet 0    ondansetron (ZOFRAN-ODT) 4 MG TbDL Take 2 tablets (8 mg total) by mouth every 6 (six) hours as needed (prn). 15 tablet 0    phenoL (CHLORASEPTIC THROAT SPRAY) 1.4 % SprA by Mucous Membrane route every 2 (two) hours. 177 mL 0    phentermine (ADIPEX-P) 37.5 mg tablet Take 1 tablet (37.5 mg total) by mouth before breakfast. 30 tablet 2    phenylephrine-DM-guaiFENesin (AQUANAZ) 10- mg Tab Take 1 tablet by mouth every 6 (six) hours. for 10 days 30 tablet 0    topiramate (TOPAMAX) 25 MG tablet Take 1 tablet (25 mg total) by mouth 2 (two) times daily. 60 tablet 11     Current Facility-Administered Medications on File Prior to Visit   Medication Dose Route Frequency Provider Last Rate Last Admin    levonorgestreL (MIRENA) 21 mcg/24 hours (8 yrs) 52 mg IUD 1 Intra Uterine Device  1 Intra Uterine Device Intrauterine  Precious Camarena, NP   1 Intra Uterine Device at 03/30/23 1330

## 2025-05-07 RX ORDER — ALBUTEROL SULFATE 90 UG/1
INHALANT RESPIRATORY (INHALATION)
Qty: 54 G | Refills: 3 | Status: SHIPPED | OUTPATIENT
Start: 2025-05-07

## 2025-05-07 NOTE — TELEPHONE ENCOUNTER
Care Due:                  Date            Visit Type   Department     Provider  --------------------------------------------------------------------------------                                ESTABLISHED                              PATIENT -    [facility]  Last Visit: 04-      [facility]      MEDICINE       Ritika Tong  Next Visit: None Scheduled  None         None Found                                                            Last  Test          Frequency    Reason                     Performed    Due Date  --------------------------------------------------------------------------------    CMP.........  12 months..  hydroCHLOROthiazide......  07- 07-    [facility] Embedded Care Due Messages. Reference number: [id number].   5/07/2025 8:44:15 AM CDT

## 2025-05-07 NOTE — TELEPHONE ENCOUNTER
Refill Routing Note   Medication(s) are not appropriate for processing by [facility] for the following reason(s):        New or recently adjusted medication    [facility] action(s):  Defer   Requires labs : Yes             Appointments  past 12m or future 3m with PCP    Date Provider   Last Visit   4/24/2025 Ritika Tong MD   Next Visit   Visit date not found Ritika Tong MD   ED visits in past 90 days: 0        Note composed:9:22 AM 05/07/2025

## 2025-07-02 ENCOUNTER — PATIENT MESSAGE (OUTPATIENT)
Dept: INTERNAL MEDICINE | Facility: CLINIC | Age: 45
End: 2025-07-02

## 2025-07-03 ENCOUNTER — RESULTS FOLLOW-UP (OUTPATIENT)
Dept: INTERNAL MEDICINE | Facility: CLINIC | Age: 45
End: 2025-07-03

## 2025-07-03 ENCOUNTER — LAB VISIT (OUTPATIENT)
Dept: LAB | Facility: HOSPITAL | Age: 45
End: 2025-07-03
Payer: COMMERCIAL

## 2025-07-03 ENCOUNTER — OFFICE VISIT (OUTPATIENT)
Dept: INTERNAL MEDICINE | Facility: CLINIC | Age: 45
End: 2025-07-03
Payer: COMMERCIAL

## 2025-07-03 DIAGNOSIS — Z11.3 SCREEN FOR STD (SEXUALLY TRANSMITTED DISEASE): Primary | ICD-10-CM

## 2025-07-03 DIAGNOSIS — Z01.89 ROUTINE LAB DRAW: ICD-10-CM

## 2025-07-03 DIAGNOSIS — Z11.3 SCREEN FOR STD (SEXUALLY TRANSMITTED DISEASE): ICD-10-CM

## 2025-07-03 LAB
ABSOLUTE EOSINOPHIL (OHS): 0.09 K/UL
ABSOLUTE MONOCYTE (OHS): 0.78 K/UL (ref 0.3–1)
ABSOLUTE NEUTROPHIL COUNT (OHS): 5.58 K/UL (ref 1.8–7.7)
ALBUMIN SERPL BCP-MCNC: 3.7 G/DL (ref 3.5–5.2)
ALP SERPL-CCNC: 58 UNIT/L (ref 40–150)
ALT SERPL W/O P-5'-P-CCNC: 8 UNIT/L (ref 10–44)
ANION GAP (OHS): 9 MMOL/L (ref 8–16)
AST SERPL-CCNC: 12 UNIT/L (ref 11–45)
BACTERIA GENITAL QL WET PREP: ABNORMAL
BASOPHILS # BLD AUTO: 0.05 K/UL
BASOPHILS NFR BLD AUTO: 0.5 %
BILIRUB SERPL-MCNC: 0.8 MG/DL (ref 0.1–1)
BUN SERPL-MCNC: 9 MG/DL (ref 6–20)
CALCIUM SERPL-MCNC: 8.8 MG/DL (ref 8.7–10.5)
CHLORIDE SERPL-SCNC: 105 MMOL/L (ref 95–110)
CLUE CELLS VAG QL WET PREP: ABNORMAL
CO2 SERPL-SCNC: 24 MMOL/L (ref 23–29)
CREAT SERPL-MCNC: 0.7 MG/DL (ref 0.5–1.4)
EAG (OHS): 105 MG/DL (ref 68–131)
ERYTHROCYTE [DISTWIDTH] IN BLOOD BY AUTOMATED COUNT: 12.8 % (ref 11.5–14.5)
FILAMENT FUNGI VAG WET PREP-#/AREA: ABNORMAL
GFR SERPLBLD CREATININE-BSD FMLA CKD-EPI: >60 ML/MIN/1.73/M2
GLUCOSE SERPL-MCNC: 95 MG/DL (ref 70–110)
HBA1C MFR BLD: 5.3 % (ref 4–5.6)
HCT VFR BLD AUTO: 38.7 % (ref 37–48.5)
HGB BLD-MCNC: 12.4 GM/DL (ref 12–16)
HIV 1+2 AB+HIV1 P24 AG SERPL QL IA: NORMAL
IMM GRANULOCYTES # BLD AUTO: 0.02 K/UL (ref 0–0.04)
IMM GRANULOCYTES NFR BLD AUTO: 0.2 % (ref 0–0.5)
LYMPHOCYTES # BLD AUTO: 3.79 K/UL (ref 1–4.8)
MCH RBC QN AUTO: 28.5 PG (ref 27–31)
MCHC RBC AUTO-ENTMCNC: 32 G/DL (ref 32–36)
MCV RBC AUTO: 89 FL (ref 82–98)
NUCLEATED RBC (/100WBC) (OHS): 0 /100 WBC
PLATELET # BLD AUTO: 311 K/UL (ref 150–450)
PMV BLD AUTO: 11 FL (ref 9.2–12.9)
POTASSIUM SERPL-SCNC: 3.5 MMOL/L (ref 3.5–5.1)
PROT SERPL-MCNC: 7.2 GM/DL (ref 6–8.4)
RBC # BLD AUTO: 4.35 M/UL (ref 4–5.4)
RELATIVE EOSINOPHIL (OHS): 0.9 %
RELATIVE LYMPHOCYTE (OHS): 36.8 % (ref 18–48)
RELATIVE MONOCYTE (OHS): 7.6 % (ref 4–15)
RELATIVE NEUTROPHIL (OHS): 54 % (ref 38–73)
SODIUM SERPL-SCNC: 138 MMOL/L (ref 136–145)
T VAGINALIS GENITAL QL WET PREP: ABNORMAL
WBC # BLD AUTO: 10.31 K/UL (ref 3.9–12.7)
WBC #/AREA VAG WET PREP: ABNORMAL
YEAST GENITAL QL WET PREP: ABNORMAL

## 2025-07-03 PROCEDURE — 83036 HEMOGLOBIN GLYCOSYLATED A1C: CPT

## 2025-07-03 PROCEDURE — 80053 COMPREHEN METABOLIC PANEL: CPT

## 2025-07-03 PROCEDURE — 86593 SYPHILIS TEST NON-TREP QUANT: CPT

## 2025-07-03 PROCEDURE — 87389 HIV-1 AG W/HIV-1&-2 AB AG IA: CPT

## 2025-07-03 PROCEDURE — 85025 COMPLETE CBC W/AUTO DIFF WBC: CPT

## 2025-07-03 PROCEDURE — 87210 SMEAR WET MOUNT SALINE/INK: CPT

## 2025-07-03 PROCEDURE — 36415 COLL VENOUS BLD VENIPUNCTURE: CPT

## 2025-07-03 NOTE — PROGRESS NOTES
Judie Munoz  07/03/2025  [id number]    Ritika Tong MD  Patient Care Team:  Ritika Tong MD as PCP - General (Family Medicine)  Lilibeth Callejas LPN as Care Coordinator (Internal Medicine)          Visit Type:an urgent visit for a new problem    The patient location is: [place]  The chief complaint leading to consultation is: STD testing     Visit type: audiovisual    Face to Face time with patient: 30   minutes of total time spent on the encounter, which includes face to face time and non-face to face time preparing to see the patient (eg, review of tests), Obtaining and/or reviewing separately obtained history, Documenting clinical information in the electronic or other health record, Independently interpreting results (not separately reported) and communicating results to the patient/family/caregiver, or Care coordination (not separately reported).         Each patient to whom he or she provides medical services by telemedicine is:  (1) informed of the relationship between the physician and patient and the respective role of any other health care provider with respect to management of the patient; and (2) notified that he or she may decline to receive medical services by telemedicine and may withdraw from such care at any time.    Notes:      History of Present Illness    CHIEF COMPLAINT:  Ms. Munoz presents today requesting STD testing due to possible exposure    STD TESTING:  She requests STD testing due to possible exposure. She denies current vaginal discharge or any other symptoms related to sexually transmitted infections.    BLOOD PRESSURE MONITORING:  She owns a digital blood pressure monitoring device but is currently not utilizing it. She has discontinued home blood pressure monitoring for an unspecified period of time but recognizes the need to resume periodic blood pressure checks and appears receptive to medical guidance on home monitoring.    ALLERGIES:  She denies any known  drug allergies.      ROS:  General: -fever, -chills, -fatigue, -weight gain, -weight loss  Eyes: -vision changes, -redness, -discharge  ENT: -ear pain, -nasal congestion, -sore throat  Cardiovascular: -chest pain, -palpitations, -lower extremity edema  Respiratory: -cough, -shortness of breath  Gastrointestinal: -abdominal pain, -nausea, -vomiting, -diarrhea, -constipation, -blood in stool  Genitourinary: -dysuria, -hematuria, -frequency  Musculoskeletal: -joint pain, -muscle pain  Skin: -rash, -lesion  Neurological: -headache, -dizziness, -numbness, -tingling  Psychiatric: -anxiety, -depression, -sleep difficulty          History:  Past Medical History:   Diagnosis Date    Hypertension      Past Surgical History:   Procedure Laterality Date    COLONOSCOPY N/A 4/18/2024    Procedure: COLONOSCOPY- possible hemorrhoid banding;  Surgeon: Jo Jones MD;  Location: [facility];  Service: Colon and Rectal;  Laterality: N/A;    D&C x 3       miscarriages     Family History   Problem Relation Name Age of Onset    Heart disease Mother          a fib    Hypertension Mother      Diabetes Father      Cancer Maternal Aunt      Diabetes Paternal Aunt      Diabetes Maternal Grandmother      Heart disease Maternal Grandmother      Kidney disease Maternal Grandmother       Social History[1]  Problem List[2]  Review of patient's allergies indicates:  No Known Allergies    The following were reviewed at this visit: active problem list, medication list, allergies, family history, social history, and health maintenance.    Medications:  Medications Ordered Prior to Encounter[3]    Medications have been reviewed and reconciled with patient at this visit.  Barriers to medications reviewed with patient.    Adverse reactions to current medications reviewed with patient..    Over the counter medications reviewed and reconciled with patient.    Exam:  Wt Readings from Last 3 Encounters:   04/22/25 89.8 kg (198 lb 1.3 oz)   12/26/24  91.1 kg (200 lb 13.4 oz)   07/01/24 97.5 kg (214 lb 15.2 oz)     Temp Readings from Last 3 Encounters:   04/22/25 99 °F (37.2 °C) (Tympanic)   04/18/24 97 °F (36.1 °C)   03/27/24 98 °F (36.7 °C) (Oral)     BP Readings from Last 3 Encounters:   04/22/25 114/67   12/26/24 120/72   07/01/24 118/86     Pulse Readings from Last 3 Encounters:   04/22/25 95   07/01/24 84   04/18/24 85     There is no height or weight on file to calculate BMI.    Physical Exam  Nursing note reviewed.   Pulmonary:      Effort: Pulmonary effort is normal. No respiratory distress.   Neurological:      Mental Status: She is alert and oriented to person, place, and time.   Psychiatric:         Mood and Affect: Mood normal.         Behavior: Behavior normal.         Thought Content: Thought content normal.         Judgment: Judgment normal.     Answers submitted by the patient for this visit:  Review of Systems Questionnaire (Submitted on 7/3/2025)  activity change: No  unexpected weight change: No  neck pain: No  hearing loss: No  rhinorrhea: No  trouble swallowing: No  eye discharge: No  visual disturbance: No  chest tightness: No  wheezing: No  chest pain: No  palpitations: No  blood in stool: No  constipation: No  vomiting: No  diarrhea: No  polydipsia: No  polyuria: No  difficulty urinating: No  hematuria: No  menstrual problem: No  dysuria: No  joint swelling: No  arthralgias: No  headaches: No  weakness: No  confusion: No  dysphoric mood: No        Laboratory Reviewed ({Yes)  Lab Results   Component Value Date    WBC 8.13 03/05/2024    HGB 12.9 03/05/2024    HCT 39.2 03/05/2024     03/05/2024    CHOL 157 07/09/2024    TRIG 46 07/09/2024    HDL 42 07/09/2024    ALT 9 (L) 07/09/2024    AST 12 07/09/2024     07/09/2024    K 3.6 07/09/2024     07/09/2024    CREATININE 0.7 07/09/2024    BUN 12 07/09/2024    CO2 28 07/09/2024    TSH 2.201 03/05/2024    HGBA1C 5.4 07/09/2024       There are no diagnoses linked to this  encounter.      Assessment & Plan           Diagnoses and all orders for this visit:    Screen for STD (sexually transmitted disease)  -     Wet Prep, Genital  -     C. trachomatis/N. gonorrhoeae by AMP DNA; Future  -     Treponema Pallidium Antibodies IgG, IgM; Future  -     HIV 1/2 Ag/Ab (4th Gen); Future  -     C. trachomatis/N. gonorrhoeae by AMP DNA  -     C. trachomatis/N. gonorrhoeae by AMP DNA; Future    Routine lab draw  -     CBC Auto Differential; Future  -     Lipid Panel; Future  -     Comprehensive Metabolic Panel; Future  -     Hemoglobin A1C; Future      Assessment & Plan    CONTACT WITH AND (SUSPECTED) EXPOSURE TO SEXUALLY TRANSMITTED INFECTIONS:  - Evaluated patient who suspects possible STD exposure but is currently asymptomatic.  - Ordered comprehensive STD panel including HIV and syphilis (blood), chlamydia and gonorrhea (urine), and trichomonas, yeast, and bacterial vaginosis (vaginal swab).  - Explained testing process and instructed patient on self-administration of vaginal swab.  - Ms. Munoz to complete all specimen collection today.    Pt came to the clinic today for testing     ALLERGY STATUS TO DRUGS, MEDICAMENTS AND BIOLOGICAL SUBSTANCES:  - No known drug allergies.    ROUTINE HEALTH MAINTENANCE:  - Annual bloodwork and physical exam needed as these have not been done recently.  - Ordered annual labs including CMP, cholesterol levels, CBC, and A1C level.  - Ms. Munoz advised to check BP at home twice monthly.    FOLLOW-UP:  - Follow up on Tuesday, 15th at 8:20 AM for in-office annual exam, which will include BP check and more thorough evaluation.            Care Plan/Goals: Reviewed    Goals         Blood Pressure < 130/80 (pt-stated)       Exercise at least 150 minutes per week.       Keep up the great work with getting 30 min of exercise most days per week!         Maintain a low sodium diet       Aim for less than 2000 mg sodium per day and look for items with less than 140 mg  sodium per serving        Take at least one BP reading per week at various times of the day             Follow up: No follow-ups on file.    After visit summary was printed and given to patient upon discharge today.  Patient goals and care plan are included in After Visit Summary.    This note was generated with the assistance of ambient listening technology. Verbal consent was obtained by the patient and accompanying visitor(s) for the recording of patient appointment to facilitate this note. I attest to having reviewed and edited the generated note for accuracy, though some syntax or spelling errors may persist. Please contact the author of this note for any clarification.                      [1]   Social History  Socioeconomic History    Marital status: Single    Number of children: 2   Occupational History     Employer: [employer]   Tobacco Use    Smoking status: Never    Smokeless tobacco: Never   Substance and Sexual Activity    Alcohol use: No     Alcohol/week: 0.0 standard drinks of alcohol    Drug use: No    Sexual activity: Not Currently     Partners: Male     Social Drivers of Health     Financial Resource Strain: Patient Declined (4/8/2025)    Overall Financial Resource Strain (CARDIA)     Difficulty of Paying Living Expenses: Patient declined   Food Insecurity: Patient Declined (4/8/2025)    Hunger Vital Sign     Worried About Running Out of Food in the Last Year: Patient declined     Ran Out of Food in the Last Year: Patient declined   Transportation Needs: Patient Declined (4/8/2025)    PRAPARE - Transportation     Lack of Transportation (Medical): Patient declined     Lack of Transportation (Non-Medical): Patient declined   Physical Activity: Insufficiently Active (4/8/2025)    Exercise Vital Sign     Days of Exercise per Week: 1 day     Minutes of Exercise per Session: 30 min   Stress: Patient Declined (4/8/2025)    [employer] - Occupational Stress  Questionnaire     Feeling of Stress : Patient declined   Housing Stability: Patient Declined (4/8/2025)    Housing Stability Vital Sign     Unable to Pay for Housing in the Last Year: Patient declined     Homeless in the Last Year: Patient declined   [2]   Patient Active Problem List  Diagnosis    Hypertension    Migraines    Thyroid nodule    Screening for colon cancer   [3]   Current Outpatient Medications on File Prior to Visit   Medication Sig Dispense Refill    albuterol (PROVENTIL/VENTOLIN HFA) 90 mcg/actuation inhaler INHALE 2 PUFFS INTO THE LUNGS EVERY 4 HOURS AS NEEDED FOR WHEEZING OR RESCUE 54 g 3    amLODIPine (NORVASC) 5 MG tablet Take 1 tablet (5 mg total) by mouth once daily. 90 tablet 3    butalbital-acetaminophen-caffeine -40 mg (FIORICET, ESGIC) -40 mg per tablet Take 1 tablet by mouth every 8 (eight) hours as needed for Headaches. 30 tablet 0    hydroCHLOROthiazide (HYDRODIURIL) 25 MG tablet Take 1 tablet (25 mg total) by mouth once daily. 90 tablet 3    nitrofurantoin, macrocrystal-monohydrate, (MACROBID) 100 MG capsule Take 1 capsule (100 mg total) by mouth 2 (two) times daily. 10 capsule 0    ondansetron (ZOFRAN-ODT) 4 MG TbDL Take 2 tablets (8 mg total) by mouth every 6 (six) hours as needed (nausea). 20 tablet 0    ondansetron (ZOFRAN-ODT) 4 MG TbDL Take 2 tablets (8 mg total) by mouth every 6 (six) hours as needed (prn). 15 tablet 0    phenoL (CHLORASEPTIC THROAT SPRAY) 1.4 % SprA by Mucous Membrane route every 2 (two) hours. 177 mL 0    phentermine (ADIPEX-P) 37.5 mg tablet Take 1 tablet (37.5 mg total) by mouth before breakfast. 30 tablet 2    topiramate (TOPAMAX) 25 MG tablet Take 1 tablet (25 mg total) by mouth 2 (two) times daily. 60 tablet 11     Current Facility-Administered Medications on File Prior to Visit   Medication Dose Route Frequency Provider Last Rate Last Admin    levonorgestreL (MIRENA) 21 mcg/24 hours (8 yrs) 52 mg IUD 1 Intra Uterine Device  1 Intra Uterine  Device Intrauterine  Precious Camarena, NP   1 Intra Uterine Device at 03/30/23 9538

## 2025-07-04 DIAGNOSIS — N76.0 ACUTE VAGINITIS: Primary | ICD-10-CM

## 2025-07-04 LAB — T PALLIDUM IGG+IGM SER QL: NORMAL

## 2025-07-04 RX ORDER — METRONIDAZOLE 500 MG/1
500 TABLET ORAL EVERY 12 HOURS
Qty: 14 TABLET | Refills: 0 | Status: SHIPPED | OUTPATIENT
Start: 2025-07-04 | End: 2025-07-11

## 2025-08-20 ENCOUNTER — OFFICE VISIT (OUTPATIENT)
Dept: INTERNAL MEDICINE | Facility: CLINIC | Age: 45
End: 2025-08-20
Payer: COMMERCIAL

## 2025-08-20 VITALS
SYSTOLIC BLOOD PRESSURE: 124 MMHG | OXYGEN SATURATION: 98 % | HEART RATE: 92 BPM | HEIGHT: 67 IN | WEIGHT: 202.19 LBS | TEMPERATURE: 98 F | BODY MASS INDEX: 31.73 KG/M2 | DIASTOLIC BLOOD PRESSURE: 88 MMHG

## 2025-08-20 DIAGNOSIS — I10 PRIMARY HYPERTENSION: ICD-10-CM

## 2025-08-20 DIAGNOSIS — G43.C0 PERIODIC HEADACHE SYNDROME, NOT INTRACTABLE: Primary | ICD-10-CM

## 2025-08-20 DIAGNOSIS — Z12.31 SCREENING MAMMOGRAM, ENCOUNTER FOR: ICD-10-CM

## 2025-08-20 PROBLEM — Z12.11 SCREENING FOR COLON CANCER: Status: RESOLVED | Noted: 2024-04-18 | Resolved: 2025-08-20

## 2025-08-20 PROCEDURE — 1159F MED LIST DOCD IN RCRD: CPT | Mod: CPTII,S$GLB,, | Performed by: FAMILY MEDICINE

## 2025-08-20 PROCEDURE — 3044F HG A1C LEVEL LT 7.0%: CPT | Mod: CPTII,S$GLB,, | Performed by: FAMILY MEDICINE

## 2025-08-20 PROCEDURE — 3079F DIAST BP 80-89 MM HG: CPT | Mod: CPTII,S$GLB,, | Performed by: FAMILY MEDICINE

## 2025-08-20 PROCEDURE — 99999 PR PBB SHADOW E&M-EST. PATIENT-LVL III: CPT | Mod: PBBFAC,,, | Performed by: FAMILY MEDICINE

## 2025-08-20 PROCEDURE — 3008F BODY MASS INDEX DOCD: CPT | Mod: CPTII,S$GLB,, | Performed by: FAMILY MEDICINE

## 2025-08-20 PROCEDURE — 99214 OFFICE O/P EST MOD 30 MIN: CPT | Mod: S$GLB,,, | Performed by: FAMILY MEDICINE

## 2025-08-20 PROCEDURE — 1160F RVW MEDS BY RX/DR IN RCRD: CPT | Mod: CPTII,S$GLB,, | Performed by: FAMILY MEDICINE

## 2025-08-20 PROCEDURE — 3074F SYST BP LT 130 MM HG: CPT | Mod: CPTII,S$GLB,, | Performed by: FAMILY MEDICINE

## 2025-08-20 RX ORDER — AMITRIPTYLINE HYDROCHLORIDE 25 MG/1
25 TABLET, FILM COATED ORAL NIGHTLY PRN
Qty: 30 TABLET | Refills: 3 | Status: SHIPPED | OUTPATIENT
Start: 2025-08-20 | End: 2026-08-20

## 2025-08-20 RX ORDER — SUMATRIPTAN SUCCINATE 25 MG/1
25 TABLET ORAL
Qty: 9 TABLET | Refills: 1 | Status: SHIPPED | OUTPATIENT
Start: 2025-08-20 | End: 2025-09-19

## 2025-08-20 RX ORDER — ONDANSETRON 8 MG/1
8 TABLET, ORALLY DISINTEGRATING ORAL EVERY 6 HOURS PRN
Qty: 15 TABLET | Refills: 0 | Status: SHIPPED | OUTPATIENT
Start: 2025-08-20

## 2025-08-25 DIAGNOSIS — I10 HYPERTENSION GOAL BP (BLOOD PRESSURE) < 140/90: ICD-10-CM

## 2025-08-25 DIAGNOSIS — I10 ESSENTIAL HYPERTENSION: ICD-10-CM

## 2025-08-25 RX ORDER — AMLODIPINE BESYLATE 5 MG/1
5 TABLET ORAL DAILY
Qty: 90 TABLET | Refills: 3 | Status: SHIPPED | OUTPATIENT
Start: 2025-08-25

## 2025-08-25 RX ORDER — HYDROCHLOROTHIAZIDE 25 MG/1
25 TABLET ORAL DAILY
Qty: 90 TABLET | Refills: 3 | Status: SHIPPED | OUTPATIENT
Start: 2025-08-25